# Patient Record
Sex: MALE | Race: WHITE | NOT HISPANIC OR LATINO | URBAN - METROPOLITAN AREA
[De-identification: names, ages, dates, MRNs, and addresses within clinical notes are randomized per-mention and may not be internally consistent; named-entity substitution may affect disease eponyms.]

---

## 2019-10-07 ENCOUNTER — INPATIENT (INPATIENT)
Facility: HOSPITAL | Age: 79
LOS: 9 days | Discharge: HOME HEALTH | DRG: 233 | End: 2019-10-17
Attending: THORACIC SURGERY (CARDIOTHORACIC VASCULAR SURGERY) | Admitting: THORACIC SURGERY (CARDIOTHORACIC VASCULAR SURGERY)
Payer: MEDICARE

## 2019-10-07 VITALS
HEIGHT: 67 IN | OXYGEN SATURATION: 95 % | HEART RATE: 77 BPM | RESPIRATION RATE: 16 BRPM | WEIGHT: 179.9 LBS | SYSTOLIC BLOOD PRESSURE: 144 MMHG | TEMPERATURE: 98 F | DIASTOLIC BLOOD PRESSURE: 82 MMHG

## 2019-10-07 DIAGNOSIS — E78.5 HYPERLIPIDEMIA, UNSPECIFIED: ICD-10-CM

## 2019-10-07 DIAGNOSIS — D62 ACUTE POSTHEMORRHAGIC ANEMIA: ICD-10-CM

## 2019-10-07 DIAGNOSIS — D69.1 QUALITATIVE PLATELET DEFECTS: ICD-10-CM

## 2019-10-07 DIAGNOSIS — N40.1 BENIGN PROSTATIC HYPERPLASIA WITH LOWER URINARY TRACT SYMPTOMS: ICD-10-CM

## 2019-10-07 DIAGNOSIS — T81.19XA OTHER POSTPROCEDURAL SHOCK, INITIAL ENCOUNTER: ICD-10-CM

## 2019-10-07 DIAGNOSIS — I10 ESSENTIAL (PRIMARY) HYPERTENSION: ICD-10-CM

## 2019-10-07 DIAGNOSIS — Z88.1 ALLERGY STATUS TO OTHER ANTIBIOTIC AGENTS STATUS: ICD-10-CM

## 2019-10-07 DIAGNOSIS — R09.02 HYPOXEMIA: ICD-10-CM

## 2019-10-07 DIAGNOSIS — I25.10 ATHEROSCLEROTIC HEART DISEASE OF NATIVE CORONARY ARTERY WITHOUT ANGINA PECTORIS: ICD-10-CM

## 2019-10-07 DIAGNOSIS — I70.1 ATHEROSCLEROSIS OF RENAL ARTERY: ICD-10-CM

## 2019-10-07 DIAGNOSIS — T81.82XA EMPHYSEMA (SUBCUTANEOUS) RESULTING FROM A PROCEDURE, INITIAL ENCOUNTER: ICD-10-CM

## 2019-10-07 DIAGNOSIS — M10.9 GOUT, UNSPECIFIED: ICD-10-CM

## 2019-10-07 DIAGNOSIS — Z87.891 PERSONAL HISTORY OF NICOTINE DEPENDENCE: ICD-10-CM

## 2019-10-07 DIAGNOSIS — I25.110 ATHEROSCLEROTIC HEART DISEASE OF NATIVE CORONARY ARTERY WITH UNSTABLE ANGINA PECTORIS: ICD-10-CM

## 2019-10-07 DIAGNOSIS — I50.42 CHRONIC COMBINED SYSTOLIC (CONGESTIVE) AND DIASTOLIC (CONGESTIVE) HEART FAILURE: ICD-10-CM

## 2019-10-07 DIAGNOSIS — Z86.79 PERSONAL HISTORY OF OTHER DISEASES OF THE CIRCULATORY SYSTEM: Chronic | ICD-10-CM

## 2019-10-07 DIAGNOSIS — J95.811 POSTPROCEDURAL PNEUMOTHORAX: ICD-10-CM

## 2019-10-07 DIAGNOSIS — K21.9 GASTRO-ESOPHAGEAL REFLUX DISEASE WITHOUT ESOPHAGITIS: ICD-10-CM

## 2019-10-07 DIAGNOSIS — I11.0 HYPERTENSIVE HEART DISEASE WITH HEART FAILURE: ICD-10-CM

## 2019-10-07 DIAGNOSIS — I31.4 CARDIAC TAMPONADE: ICD-10-CM
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
## 2019-10-07 DIAGNOSIS — T45.525A ADVERSE EFFECT OF ANTITHROMBOTIC DRUGS, INITIAL ENCOUNTER: ICD-10-CM

## 2019-10-07 DIAGNOSIS — I97.611 POSTPROCEDURAL HEMORRHAGE OF A CIRCULATORY SYSTEM ORGAN OR STRUCTURE FOLLOWING CARDIAC BYPASS: ICD-10-CM

## 2019-10-07 DIAGNOSIS — Z95.5 PRESENCE OF CORONARY ANGIOPLASTY IMPLANT AND GRAFT: ICD-10-CM

## 2019-10-07 DIAGNOSIS — I25.2 OLD MYOCARDIAL INFARCTION: ICD-10-CM

## 2019-10-07 DIAGNOSIS — Z95.5 PRESENCE OF CORONARY ANGIOPLASTY IMPLANT AND GRAFT: Chronic | ICD-10-CM

## 2019-10-07 DIAGNOSIS — R33.8 OTHER RETENTION OF URINE: ICD-10-CM
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
## 2019-10-07 DIAGNOSIS — N17.9 ACUTE KIDNEY FAILURE, UNSPECIFIED: ICD-10-CM

## 2019-10-07 DIAGNOSIS — T39.015A ADVERSE EFFECT OF ASPIRIN, INITIAL ENCOUNTER: ICD-10-CM

## 2019-10-07 DIAGNOSIS — J95.821 ACUTE POSTPROCEDURAL RESPIRATORY FAILURE: ICD-10-CM

## 2019-10-07 LAB
ALBUMIN SERPL ELPH-MCNC: 4.1 G/DL — SIGNIFICANT CHANGE UP (ref 3.3–5)
ALP SERPL-CCNC: 72 U/L — SIGNIFICANT CHANGE UP (ref 40–120)
ALT FLD-CCNC: 18 U/L — SIGNIFICANT CHANGE UP (ref 10–45)
ANION GAP SERPL CALC-SCNC: 11 MMOL/L — SIGNIFICANT CHANGE UP (ref 5–17)
APTT BLD: 34.4 SEC — SIGNIFICANT CHANGE UP (ref 27.5–36.3)
AST SERPL-CCNC: 23 U/L — SIGNIFICANT CHANGE UP (ref 10–40)
BILIRUB SERPL-MCNC: 1 MG/DL — SIGNIFICANT CHANGE UP (ref 0.2–1.2)
BLD GP AB SCN SERPL QL: NEGATIVE — SIGNIFICANT CHANGE UP
BUN SERPL-MCNC: 16 MG/DL — SIGNIFICANT CHANGE UP (ref 7–23)
CALCIUM SERPL-MCNC: 9.2 MG/DL — SIGNIFICANT CHANGE UP (ref 8.4–10.5)
CHLORIDE SERPL-SCNC: 101 MMOL/L — SIGNIFICANT CHANGE UP (ref 96–108)
CHOLEST SERPL-MCNC: 135 MG/DL — SIGNIFICANT CHANGE UP (ref 10–199)
CO2 SERPL-SCNC: 24 MMOL/L — SIGNIFICANT CHANGE UP (ref 22–31)
CREAT SERPL-MCNC: 0.88 MG/DL — SIGNIFICANT CHANGE UP (ref 0.5–1.3)
GLUCOSE SERPL-MCNC: 90 MG/DL — SIGNIFICANT CHANGE UP (ref 70–99)
HBA1C BLD-MCNC: 4.9 % — SIGNIFICANT CHANGE UP (ref 4–5.6)
HCT VFR BLD CALC: 40 % — SIGNIFICANT CHANGE UP (ref 39–50)
HDLC SERPL-MCNC: 71 MG/DL — SIGNIFICANT CHANGE UP
HGB BLD-MCNC: 14.3 G/DL — SIGNIFICANT CHANGE UP (ref 13–17)
INR BLD: 1.2 — HIGH (ref 0.88–1.16)
LIPID PNL WITH DIRECT LDL SERPL: 44 MG/DL — SIGNIFICANT CHANGE UP
MCHC RBC-ENTMCNC: 32.9 PG — SIGNIFICANT CHANGE UP (ref 27–34)
MCHC RBC-ENTMCNC: 35.8 GM/DL — SIGNIFICANT CHANGE UP (ref 32–36)
MCV RBC AUTO: 92 FL — SIGNIFICANT CHANGE UP (ref 80–100)
NRBC # BLD: 0 /100 WBCS — SIGNIFICANT CHANGE UP (ref 0–0)
NT-PROBNP SERPL-SCNC: 1875 PG/ML — HIGH (ref 0–300)
PLATELET # BLD AUTO: 157 K/UL — SIGNIFICANT CHANGE UP (ref 150–400)
POTASSIUM SERPL-MCNC: 4.1 MMOL/L — SIGNIFICANT CHANGE UP (ref 3.5–5.3)
POTASSIUM SERPL-SCNC: 4.1 MMOL/L — SIGNIFICANT CHANGE UP (ref 3.5–5.3)
PROT SERPL-MCNC: 7.2 G/DL — SIGNIFICANT CHANGE UP (ref 6–8.3)
PROTHROM AB SERPL-ACNC: 13.6 SEC — HIGH (ref 10–12.9)
RBC # BLD: 4.35 M/UL — SIGNIFICANT CHANGE UP (ref 4.2–5.8)
RBC # FLD: 15 % — HIGH (ref 10.3–14.5)
RH IG SCN BLD-IMP: POSITIVE — SIGNIFICANT CHANGE UP
SODIUM SERPL-SCNC: 136 MMOL/L — SIGNIFICANT CHANGE UP (ref 135–145)
TOTAL CHOLESTEROL/HDL RATIO MEASUREMENT: 1.9 RATIO — LOW (ref 3.4–9.6)
TRIGL SERPL-MCNC: 101 MG/DL — SIGNIFICANT CHANGE UP (ref 10–149)
TSH SERPL-MCNC: 2.24 UIU/ML — SIGNIFICANT CHANGE UP (ref 0.35–4.94)
WBC # BLD: 12.08 K/UL — HIGH (ref 3.8–10.5)
WBC # FLD AUTO: 12.08 K/UL — HIGH (ref 3.8–10.5)

## 2019-10-07 PROCEDURE — 99223 1ST HOSP IP/OBS HIGH 75: CPT

## 2019-10-07 PROCEDURE — 93454 CORONARY ARTERY ANGIO S&I: CPT | Mod: 26

## 2019-10-07 PROCEDURE — 71046 X-RAY EXAM CHEST 2 VIEWS: CPT | Mod: 26

## 2019-10-07 PROCEDURE — 71045 X-RAY EXAM CHEST 1 VIEW: CPT | Mod: 26

## 2019-10-07 PROCEDURE — 93010 ELECTROCARDIOGRAM REPORT: CPT

## 2019-10-07 PROCEDURE — 33967 INSERT I-AORT PERCUT DEVICE: CPT

## 2019-10-07 PROCEDURE — 99285 EMERGENCY DEPT VISIT HI MDM: CPT | Mod: 25

## 2019-10-07 RX ORDER — ROSUVASTATIN CALCIUM 5 MG/1
0 TABLET ORAL
Qty: 0 | Refills: 0 | DISCHARGE

## 2019-10-07 RX ORDER — ASPIRIN/CALCIUM CARB/MAGNESIUM 324 MG
81 TABLET ORAL EVERY 24 HOURS
Refills: 0 | Status: DISCONTINUED | OUTPATIENT
Start: 2019-10-08 | End: 2019-10-08

## 2019-10-07 RX ORDER — ASPIRIN/CALCIUM CARB/MAGNESIUM 324 MG
0 TABLET ORAL
Qty: 0 | Refills: 0 | DISCHARGE

## 2019-10-07 RX ORDER — ENOXAPARIN SODIUM 100 MG/ML
40 INJECTION SUBCUTANEOUS EVERY 24 HOURS
Refills: 0 | Status: DISCONTINUED | OUTPATIENT
Start: 2019-10-08 | End: 2019-10-08

## 2019-10-07 RX ORDER — CARVEDILOL PHOSPHATE 80 MG/1
1 CAPSULE, EXTENDED RELEASE ORAL
Qty: 0 | Refills: 0 | DISCHARGE

## 2019-10-07 RX ORDER — NITROGLYCERIN 6.5 MG
0.4 CAPSULE, EXTENDED RELEASE ORAL
Refills: 0 | Status: DISCONTINUED | OUTPATIENT
Start: 2019-10-07 | End: 2019-10-07

## 2019-10-07 RX ORDER — SODIUM CHLORIDE 9 MG/ML
500 INJECTION INTRAMUSCULAR; INTRAVENOUS; SUBCUTANEOUS
Refills: 0 | Status: DISCONTINUED | OUTPATIENT
Start: 2019-10-07 | End: 2019-10-08

## 2019-10-07 RX ORDER — CARVEDILOL PHOSPHATE 80 MG/1
25 CAPSULE, EXTENDED RELEASE ORAL ONCE
Refills: 0 | Status: COMPLETED | OUTPATIENT
Start: 2019-10-07 | End: 2019-10-07

## 2019-10-07 RX ORDER — CLOPIDOGREL BISULFATE 75 MG/1
600 TABLET, FILM COATED ORAL ONCE
Refills: 0 | Status: COMPLETED | OUTPATIENT
Start: 2019-10-07 | End: 2019-10-07

## 2019-10-07 RX ORDER — INFLUENZA VIRUS VACCINE 15; 15; 15; 15 UG/.5ML; UG/.5ML; UG/.5ML; UG/.5ML
0.5 SUSPENSION INTRAMUSCULAR ONCE
Refills: 0 | Status: DISCONTINUED | OUTPATIENT
Start: 2019-10-07 | End: 2019-10-08

## 2019-10-07 RX ORDER — ASPIRIN/CALCIUM CARB/MAGNESIUM 324 MG
325 TABLET ORAL ONCE
Refills: 0 | Status: COMPLETED | OUTPATIENT
Start: 2019-10-07 | End: 2019-10-07

## 2019-10-07 RX ORDER — AMLODIPINE BESYLATE 2.5 MG/1
10 TABLET ORAL ONCE
Refills: 0 | Status: COMPLETED | OUTPATIENT
Start: 2019-10-07 | End: 2019-10-07

## 2019-10-07 RX ORDER — MIDODRINE HYDROCHLORIDE 2.5 MG/1
5 TABLET ORAL THREE TIMES A DAY
Refills: 0 | Status: DISCONTINUED | OUTPATIENT
Start: 2019-10-07 | End: 2019-10-07

## 2019-10-07 RX ORDER — ATORVASTATIN CALCIUM 80 MG/1
80 TABLET, FILM COATED ORAL AT BEDTIME
Refills: 0 | Status: DISCONTINUED | OUTPATIENT
Start: 2019-10-07 | End: 2019-10-08

## 2019-10-07 RX ORDER — ALLOPURINOL 300 MG
0 TABLET ORAL
Qty: 0 | Refills: 0 | DISCHARGE

## 2019-10-07 RX ADMIN — CARVEDILOL PHOSPHATE 25 MILLIGRAM(S): 80 CAPSULE, EXTENDED RELEASE ORAL at 07:44

## 2019-10-07 RX ADMIN — ATORVASTATIN CALCIUM 80 MILLIGRAM(S): 80 TABLET, FILM COATED ORAL at 23:06

## 2019-10-07 RX ADMIN — AMLODIPINE BESYLATE 10 MILLIGRAM(S): 2.5 TABLET ORAL at 07:45

## 2019-10-07 RX ADMIN — Medication 0.4 MILLIGRAM(S): at 07:38

## 2019-10-07 RX ADMIN — Medication 0.4 MILLIGRAM(S): at 08:17

## 2019-10-07 RX ADMIN — SODIUM CHLORIDE 50 MILLILITER(S): 9 INJECTION INTRAMUSCULAR; INTRAVENOUS; SUBCUTANEOUS at 12:01

## 2019-10-07 RX ADMIN — CLOPIDOGREL BISULFATE 600 MILLIGRAM(S): 75 TABLET, FILM COATED ORAL at 11:47

## 2019-10-07 RX ADMIN — Medication 325 MILLIGRAM(S): at 07:36

## 2019-10-07 NOTE — H&P ADULT - RS GEN PE MLT RESP DETAILS PC
good air movement/respirations non-labored/breath sounds equal/clear to auscultation bilaterally/normal/airway patent

## 2019-10-07 NOTE — ED ADULT NURSE NOTE - OBJECTIVE STATEMENT
78 y/o male c/o cp. Pt denies sob. Pt reports mid sternal CP started approximately 1.5 hours ago. Pt report cardiac stent history. Pt denies dizzines, N/V or cough. Pt speaks clear, MAEx4, ambulates steady, unlabored breathing. abd soft nt nd. Skin dry warm

## 2019-10-07 NOTE — ED PROVIDER NOTE - PROGRESS NOTE DETAILS
DDimer, trop neg.  Pt feeling better after meds.  Pt discussed w Dr Patiño - no preference for cardiologist.  He states pt w recent abnl echo and ekg in his office which is why he'd arranged outpt fu w cardiologist (cardiologist is out of [facility]).  Pt discussed w hospitalist, Dr Milton - pt tba to him for r/o and eval.

## 2019-10-07 NOTE — ED PROVIDER NOTE - DIAGNOSTIC INTERPRETATION
ER Physician:  Isabel Director  CHEST XRAY INTERPRETATION: lungs clear, tortuous aorta, heart shadow normal, bony structures intact

## 2019-10-07 NOTE — ED PROVIDER NOTE - OBJECTIVE STATEMENT
78 yo male h/o cad s/p stent x 2 (2010), renal artery stenosis s/p stent, htn, hld, gout, former tob c/o sscp since 4am, now 3/10 but more intense at time of onset.  No h/o recent exertional cp/sob/palpitations.  No associated n, dizziness, diaphoresis, sob, palpitations, abd pain.  Pt feels pain in his upper back but does not feel it's radiating from his chest, no radiation to arm/neck/jaw.  Pt reports h/o similar back pain in the past.  No fever, uri sx, cough.  No le pain/swelling.  Pt recently saw his pmd and has upcoming appt w cardiology bc of not having had recent fu but does not recall the name of his new cardiologist.  No meds pta for sx.  Pt did not take his am meds yet.  No recent travel, le pain/swelling.

## 2019-10-07 NOTE — H&P ADULT - PROBLEM SELECTOR PLAN 1
-1st Troponin negative. 2/10 Chest pain.  -Cardiac Catheterization today with Dr. Langston. Consent obtained, in [place] office.  -s/p ASA/Plavix load.   -Echo ordered  -Will continue home BP/statin regimen for now

## 2019-10-07 NOTE — ED PROVIDER NOTE - PMH
CAD (coronary artery disease)    Gout    HLD (hyperlipidemia)    HTN (hypertension)    Renal artery stenosis

## 2019-10-07 NOTE — H&P ADULT - NSHPSOCIALHISTORY_GEN_ALL_CORE
Former smoker: quit 49 years ago  ETOH: one glass of wine with dinner a day  Denies drug use history

## 2019-10-07 NOTE — ED PROVIDER NOTE - MUSCULOSKELETAL, MLM
Spine appears normal, trace edema bilat le, no calf ttp, range of motion is not limited, no muscle or joint tenderness

## 2019-10-07 NOTE — H&P ADULT - ASSESSMENT
80 yo male, former smoker with a PMhx of HTN, hyperlipidemia, Renal artery stenosis s/p Left renal stent placement (5/2011), known CAD s/p MI treated with thrombectomy/ROBERTA pLAD and PTCA/ROBERTA mLAD ([facility] 6/15/2010) followed by ROBERTA to prox/mid/distal LCx with tandemheart LV assist device ([facility]; 6/21/2010) presented to [facility] ED (10/7/2019) with unstable angina with plan for cardiac catheterization with possible intervention if clinically indicated.     Risks & benefits of procedure and alternative therapy have been explained to the patient including but not limited to: allergic reaction, bleeding w/possible need for blood transfusion, infection, renal and vascular compromise, limb damage, arrhythmia, stroke, vessel dissection/perforation, Myocardial infarction, emergent CABG. Informed consent obtained and in chart.

## 2019-10-07 NOTE — PROGRESS NOTE ADULT - SUBJECTIVE AND OBJECTIVE BOX
SUBJECTIVE/INTERVAL HPI:  Saw and examined patient at bedside upon arrival on 5EA.    VITAL SIGNS:  Vital Signs Last 24 Hrs  T(C): 37 (07 Oct 2019 20:50), Max: 37 (07 Oct 2019 20:50)  T(F): 98.6 (07 Oct 2019 20:50), Max: 98.6 (07 Oct 2019 20:50)  HR: 62 (07 Oct 2019 20:00) (62 - 77)  BP: 104/72 (07 Oct 2019 20:00) (104/72 - 148/74)  BP(mean): 90 (07 Oct 2019 20:00) (61 - 102)  RR: 14 (07 Oct 2019 20:00) (14 - 21)  SpO2: 93% (07 Oct 2019 20:00) (93% - 100%)    PHYSICAL EXAM:        MEDICATIONS:  MEDICATIONS  (STANDING):  influenza   Vaccine 0.5 milliLiter(s) IntraMuscular once  sodium chloride 0.9%. 500 milliLiter(s) (50 mL/Hr) IV Continuous <Continuous>    MEDICATIONS  (PRN):      ALLERGIES:  Allergies    penicillin (Unknown)    Intolerances        LABS:                        14.4   8.96  )-----------( 160      ( 07 Oct 2019 06:50 )             41.0     10-07    136  |  100  |  21  ----------------------------<  106<H>  4.3   |  22  |  1.07    Ca    9.1      07 Oct 2019 06:50  Mg     1.7     10-07    TPro  7.1  /  Alb  4.2  /  TBili  0.4  /  DBili  x   /  AST  20  /  ALT  20  /  AlkPhos  67  10-07    PT/INR - ( 07 Oct 2019 06:50 )   PT: 12.6 sec;   INR: 1.11          PTT - ( 07 Oct 2019 06:50 )  PTT:34.3 sec    CAPILLARY BLOOD GLUCOSE          RADIOLOGY & ADDITIONAL TESTS: Reviewed. SUBJECTIVE/INTERVAL HPI:  Saw and examined patient at bedside upon arrival on 5EA. Still reports L-sided chest pain, however improved from earlier, now rated as 2/10.    VITAL SIGNS:  Vital Signs Last 24 Hrs  T(C): 37 (07 Oct 2019 20:50), Max: 37 (07 Oct 2019 20:50)  T(F): 98.6 (07 Oct 2019 20:50), Max: 98.6 (07 Oct 2019 20:50)  HR: 62 (07 Oct 2019 20:00) (62 - 77)  BP: 104/72 (07 Oct 2019 20:00) (104/72 - 148/74)  BP(mean): 90 (07 Oct 2019 20:00) (61 - 102)  RR: 14 (07 Oct 2019 20:00) (14 - 21)  SpO2: 93% (07 Oct 2019 20:00) (93% - 100%)    PHYSICAL EXAM:  GENERAL: In NAD. Lying in bed comfortably.  HEENT: NC/AT. PERRL. EOMI. Neck supple. No JVD. No lymphadenopathy. No thyromegaly.  CV: RRR. +S1/S2. No murmurs, rubs or gallops  LUNGS: Clear to auscultation b/l. No wheezing, rales or rhonchi.  ABDOMEN: Soft, nontender, nondistended. +BSx4. No guarding or rebound tenderness.  EXT: WWP. No edema in b/l extremities  VASCULAR: 2+ radial, DP/PT bilaterally       MEDICATIONS:  MEDICATIONS  (STANDING):  influenza   Vaccine 0.5 milliLiter(s) IntraMuscular once  sodium chloride 0.9%. 500 milliLiter(s) (50 mL/Hr) IV Continuous <Continuous>    MEDICATIONS  (PRN):      ALLERGIES:  Allergies    penicillin (Unknown)    Intolerances        LABS:                        14.4   8.96  )-----------( 160      ( 07 Oct 2019 06:50 )             41.0     10-07    136  |  100  |  21  ----------------------------<  106<H>  4.3   |  22  |  1.07    Ca    9.1      07 Oct 2019 06:50  Mg     1.7     10-07    TPro  7.1  /  Alb  4.2  /  TBili  0.4  /  DBili  x   /  AST  20  /  ALT  20  /  AlkPhos  67  10-07    PT/INR - ( 07 Oct 2019 06:50 )   PT: 12.6 sec;   INR: 1.11          PTT - ( 07 Oct 2019 06:50 )  PTT:34.3 sec    CAPILLARY BLOOD GLUCOSE          RADIOLOGY & ADDITIONAL TESTS: Reviewed. 5EA TRANSFER ACCEPTANCE NOTE    HOSPITAL COURSE:  79 year old male, former smoker, with history of HTN, HLD, renal artery stenosis s/p left renal stent in 2011, CAD s/p MI with thrombectomy and PES (ROBERTA to pLAD and PTCA/ROBERTA to mLAD in 6/2010 with ROBERTA to LCx with tandem heart/LVAD a week later on 6/21/2010 who presented to [facility] ED on 10/7/19 with substernal chest pain/pressure, 5/10, non-radiating that began that morning at approximately 4am without relief.  He reports that over the last month he has had intermittent "indigestion" with spontaneous onset as well as claudication with diminished exercise tolerance for which he was recently evaluated by his PMD, Dr. Patiño, where he had an "abnormal ECHO" and was encouraged to follow-up with cardiology.  In ED, VS stable, EKG showed TWI in II, III  with ST depressions in V4-V6 with negative troponin.  Pain was not relieved with medical therapies and underwent cardiac cath showing LM normal, 99% mLAD, pLCx 100% with L-L collaterals, mRCA 100% with R-R collaterals.  IABP was placed and he was admitted to CCU for further evaluation and management.  Dr. Small, CT surgery, consulted for surgical evaluation.    SUBJECTIVE/INTERVAL HPI:  Saw and examined patient at bedside upon arrival on 5EA. Still reports L-sided chest pain, however improved from earlier, now rated as 2/10. Denies any SOB, nausea, vomiting.    VITAL SIGNS:  Vital Signs Last 24 Hrs  T(C): 37 (07 Oct 2019 20:50), Max: 37 (07 Oct 2019 20:50)  T(F): 98.6 (07 Oct 2019 20:50), Max: 98.6 (07 Oct 2019 20:50)  HR: 62 (07 Oct 2019 20:00) (62 - 77)  BP: 104/72 (07 Oct 2019 20:00) (104/72 - 148/74)  BP(mean): 90 (07 Oct 2019 20:00) (61 - 102)  RR: 14 (07 Oct 2019 20:00) (14 - 21)  SpO2: 93% (07 Oct 2019 20:00) (93% - 100%)    PHYSICAL EXAM:  GENERAL: In NAD. Lying in bed comfortably.  HEENT: NC/AT. PERRL. EOMI. Neck supple. No JVD. No lymphadenopathy. No thyromegaly.  CV: RRR. +S1/S2. No murmurs, rubs or gallops  LUNGS: Clear to auscultation b/l. No wheezing, rales or rhonchi.  ABDOMEN: Soft, nontender, nondistended. +BSx4. No guarding or rebound tenderness.  EXT: WWP. No edema in b/l extremities  VASCULAR: 2+ radial, DP/PT bilaterally       MEDICATIONS:  MEDICATIONS  (STANDING):  influenza   Vaccine 0.5 milliLiter(s) IntraMuscular once  sodium chloride 0.9%. 500 milliLiter(s) (50 mL/Hr) IV Continuous <Continuous>    MEDICATIONS  (PRN):      ALLERGIES:  Allergies    penicillin (Unknown)    Intolerances        LABS:                        14.4   8.96  )-----------( 160      ( 07 Oct 2019 06:50 )             41.0     10-07    136  |  100  |  21  ----------------------------<  106<H>  4.3   |  22  |  1.07    Ca    9.1      07 Oct 2019 06:50  Mg     1.7     10-07    TPro  7.1  /  Alb  4.2  /  TBili  0.4  /  DBili  x   /  AST  20  /  ALT  20  /  AlkPhos  67  10-07    PT/INR - ( 07 Oct 2019 06:50 )   PT: 12.6 sec;   INR: 1.11          PTT - ( 07 Oct 2019 06:50 )  PTT:34.3 sec    CAPILLARY BLOOD GLUCOSE          RADIOLOGY & ADDITIONAL TESTS: Reviewed. 5EA TRANSFER ACCEPTANCE NOTE    HOSPITAL COURSE:  79 year old male, former smoker, with history of HTN, HLD, renal artery stenosis s/p left renal stent in 2011, CAD s/p MI with thrombectomy and PES (ROBERTA to pLAD and PTCA/ROBERTA to mLAD in 6/2010 with ROBERTA to LCx with tandem heart/LVAD a week later on 6/21/2010 who presented to [facility] ED on 10/7/19 with substernal chest pain/pressure, 5/10, non-radiating that began that morning at approximately 4am without relief. In ED, VS stable, EKG showed TWI in II, III  with ST depressions in V4-V6 with negative troponin.  Pain was not relieved with medical therapies and underwent cardiac cath showing LM normal, 99% mLAD, pLCx 100% with L-L collaterals, mRCA 100% with R-R collaterals.  IABP was placed 10/7 and he was admitted to CCU for further evaluation and management.  Dr. Small of CT surgery was consulted for surgical evaluation.    SUBJECTIVE/INTERVAL HPI:  Saw and examined patient at bedside upon arrival on 5EA. Still reports L-sided chest pain, however improved from earlier, now rated as 2/10. Denies any SOB, nausea, vomiting.    VITAL SIGNS:  Vital Signs Last 24 Hrs  T(C): 37 (07 Oct 2019 20:50), Max: 37 (07 Oct 2019 20:50)  T(F): 98.6 (07 Oct 2019 20:50), Max: 98.6 (07 Oct 2019 20:50)  HR: 62 (07 Oct 2019 20:00) (62 - 77)  BP: 104/72 (07 Oct 2019 20:00) (104/72 - 148/74)  BP(mean): 90 (07 Oct 2019 20:00) (61 - 102)  RR: 14 (07 Oct 2019 20:00) (14 - 21)  SpO2: 93% (07 Oct 2019 20:00) (93% - 100%)    PHYSICAL EXAM:  GENERAL: In NAD. Lying in bed comfortably.  HEENT: NC/AT. PERRL. EOMI. Neck supple.   CV: RRR. +S1/S2. No murmurs, rubs or gallops  LUNGS: Clear to auscultation b/l. No wheezing  ABDOMEN: Soft, nontender, nondistended. +BSx4.  : IABP in L groin, no hematoma  EXT: WWP. No edema in b/l extremities  VASCULAR: 2+ radial, DP/ bilaterally       MEDICATIONS:  MEDICATIONS  (STANDING):  influenza   Vaccine 0.5 milliLiter(s) IntraMuscular once  sodium chloride 0.9%. 500 milliLiter(s) (50 mL/Hr) IV Continuous <Continuous>    MEDICATIONS  (PRN):      ALLERGIES:  Allergies    penicillin (Unknown)    Intolerances        LABS:                        14.4   8.96  )-----------( 160      ( 07 Oct 2019 06:50 )             41.0     10-07    136  |  100  |  21  ----------------------------<  106<H>  4.3   |  22  |  1.07    Ca    9.1      07 Oct 2019 06:50  Mg     1.7     10-07    TPro  7.1  /  Alb  4.2  /  TBili  0.4  /  DBili  x   /  AST  20  /  ALT  20  /  AlkPhos  67  10-07    PT/INR - ( 07 Oct 2019 06:50 )   PT: 12.6 sec;   INR: 1.11          PTT - ( 07 Oct 2019 06:50 )  PTT:34.3 sec    CAPILLARY BLOOD GLUCOSE          RADIOLOGY & ADDITIONAL TESTS: Reviewed. 5EA TRANSFER ACCEPTANCE NOTE    HOSPITAL COURSE:  79 year old male, former smoker, with history of HTN, HLD, renal artery stenosis s/p left renal stent in 2011, CAD s/p MI with thrombectomy and PES (ROBERTA to pLAD and PTCA/ROBERTA to mLAD in 6/2010 with ROBERTA to LCx with tandem heart/LVAD a week later on 6/21/2010 who presented to [facility] ED on 10/7/19 with substernal chest pain/pressure, 5/10, non-radiating that began that morning at approximately 4am without relief. In ED, VS stable, EKG showed TWI in II, III  with ST depressions in V4-V6 with negative troponin.  Pain was not relieved with medical therapies and underwent cardiac cath showing LM normal, 99% mLAD, pLCx 100% with L-L collaterals, mRCA 100% with R-R collaterals.  IABP was placed 10/7 and he was admitted to CCU for further evaluation and management.  Dr. Small of CT surgery was consulted for CABG evaluation.    SUBJECTIVE/INTERVAL HPI:  Saw and examined patient at bedside upon arrival on 5EA. Still reports L-sided chest pain, however improved from earlier, now rated as 2/10. Denies any SOB, nausea, vomiting.    VITAL SIGNS:  Vital Signs Last 24 Hrs  T(C): 37 (07 Oct 2019 20:50), Max: 37 (07 Oct 2019 20:50)  T(F): 98.6 (07 Oct 2019 20:50), Max: 98.6 (07 Oct 2019 20:50)  HR: 62 (07 Oct 2019 20:00) (62 - 77)  BP: 104/72 (07 Oct 2019 20:00) (104/72 - 148/74)  BP(mean): 90 (07 Oct 2019 20:00) (61 - 102)  RR: 14 (07 Oct 2019 20:00) (14 - 21)  SpO2: 93% (07 Oct 2019 20:00) (93% - 100%)    PHYSICAL EXAM:  GENERAL: In NAD. Lying in bed comfortably.  HEENT: NC/AT. PERRL. EOMI. Neck supple.   CV: RRR. +S1/S2. No murmurs, rubs or gallops  LUNGS: Clear to auscultation b/l. No wheezing  ABDOMEN: Soft, nontender, nondistended. +BSx4.  : IABP in L groin, no hematoma  EXT: WWP. No edema in b/l extremities  VASCULAR: 2+ radial, DP/ bilaterally       MEDICATIONS:  MEDICATIONS  (STANDING):  influenza   Vaccine 0.5 milliLiter(s) IntraMuscular once  sodium chloride 0.9%. 500 milliLiter(s) (50 mL/Hr) IV Continuous <Continuous>    MEDICATIONS  (PRN):      ALLERGIES:  Allergies    penicillin (Unknown)    Intolerances        LABS:                        14.4   8.96  )-----------( 160      ( 07 Oct 2019 06:50 )             41.0     10-07    136  |  100  |  21  ----------------------------<  106<H>  4.3   |  22  |  1.07    Ca    9.1      07 Oct 2019 06:50  Mg     1.7     10-07    TPro  7.1  /  Alb  4.2  /  TBili  0.4  /  DBili  x   /  AST  20  /  ALT  20  /  AlkPhos  67  10-07    PT/INR - ( 07 Oct 2019 06:50 )   PT: 12.6 sec;   INR: 1.11          PTT - ( 07 Oct 2019 06:50 )  PTT:34.3 sec    CAPILLARY BLOOD GLUCOSE          RADIOLOGY & ADDITIONAL TESTS: Reviewed.

## 2019-10-07 NOTE — PROGRESS NOTE ADULT - ATTENDING COMMENTS
Pt is a 80 y/o man c SHANNAN s/p MAXIMILIANO (5'11), CAD s/p MI s/p ROBERTA to pLAD, mLAD, LCX who presents with chest pain for eval.    ECG: nsr @ 75 c downsloping ST dep 1mm II, V4-V6    Cath: 99% mLAD  100% pLCX, mLCX  100% mRCA  IABP placed for severe ischemia    ECG: depressions resolved    afeb, HR 62, MAP  (Ass), Aug press 150, 95%  I/O -1.5L    Hgb 14.1  Plt 160  Cr 0.82  BNP 1875  Yajaira 0.05    CXR: focal rt sided atelectasis    - pt with nstemi and severe 3v dx s/p IABP placement  - pt still with some mild chest pain but fleeting  - cont asa, hold plavix for CABG  - cont lipitor, start lopressor for acs  - post op, pt can be started on ACE  - cont hep gtt  - cont IABP and remove post op  - f/u TTE  - perhaps to OR today

## 2019-10-07 NOTE — PATIENT PROFILE ADULT - VISION (WITH CORRECTIVE LENSES IF THE PATIENT USUALLY WEARS THEM):
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

## 2019-10-07 NOTE — H&P ADULT - NSICDXPASTMEDICALHX_GEN_ALL_CORE_FT
PAST MEDICAL HISTORY:  CAD (coronary artery disease)     Gout     HLD (hyperlipidemia)     HTN (hypertension)     Renal artery stenosis

## 2019-10-07 NOTE — PROGRESS NOTE ADULT - ASSESSMENT
79 year old male, former smoker, with history of HTN, HLD, renal artery stenosis s/p left renal stent in 2011, CAD s/p MI with thrombectomy and PES (ROBERTA to pLAD and PTCA/ROBERTA to mLAD in 6/2010 with ROBERTA to LCx with tandemheart/LVAD a week later on 6/21/2010 who presented to [facility] ED on 10/7/19 with substernal chest pain/pressure, 5/10, non-radiating that began that morning at approximately 4am without relief. EKG in ED showed TWI in II, III  with ST depressions in V4-V6 with negative troponin. Cardiac cath 10/7 showing LM normal, 99% mLAD, pLCx 100% with L-L collaterals, mRCA 100% with R-R collaterals.  IABP was placed and he was admitted to CCU for further evaluation and management.  Dr. Small of CT surgery was consulted for surgical evaluation.    NEURO  Pt AAOx3. Pt with no neurological complaints or issues of concern at this time    CARDIOVASCULAR  #CAD (coronary artery disease)  -1st Troponin negative. 2/10 Chest pain; s/p ASA/Plavix load.   - Cardiac cath 10/7 showing LM normal, 99% mLAD, pLCx 100% with L-L collaterals, mRCA 100% with R-R collaterals.  -Echo ordered  -Will hold anti-hypertensive medications and ACEI/ARBs for now  - pt on crestor at home, c/w lipitor 80mg qd here    #HTN (hypertension)  -Will hold anti-hypertensive medications and ACEI/ARBs for now    #HLD  -F/U AM lipid panel.  - pt on crestor at home, c/w lipitor 80mg qd here    PULMONARY  Pt with no pulmonary complaints or issues of concern at this time    GASTROINTESTINAL  Pt with no GI complaints or issues of concern at this time    RENAL  #Renal artery stenosis  -s/p Left renal artery stent @ [facility] 5/2011  -Has not had follow up since placement  -BUN/Cr 21/1.07; repeat 16/0.88  -Would continue to monitor with daily BMPs    INFECTIOUS DISEASE  Pt afebrile, WBC elevated at 12.08. CXR without consolidation. U/A negative.    FLUIDS/ELECTROLYTES/NUTRITION  -IVF: none, s/p NS @50cc/hr for 4 hrs  -Monitor, Replete to K>4 and Mg>2  -Diet: DASH/TLC    PROPHYLAXIS  -DVT: lovenox  -GI: none  DISPO: CCU  CODE STATUS: FULL 79 year old male, former smoker, with history of HTN, HLD, renal artery stenosis s/p left renal stent in 2011, CAD s/p MI with thrombectomy and PES (ROBERTA to pLAD and PTCA/ROBERTA to mLAD in 6/2010 with ROBERTA to LCx with tandemheart/LVAD a week later on 6/21/2010 who presented to [facility] ED on 10/7/19 with substernal chest pain/pressure, 5/10, non-radiating that began that morning at approximately 4am without relief. EKG in ED showed TWI in II, III  with ST depressions in V4-V6 with negative troponin. Cardiac cath 10/7 showing LM normal, 99% mLAD, pLCx 100% with L-L collaterals, mRCA 100% with R-R collaterals.  IABP was placed and he was admitted to CCU for further evaluation and management.  Dr. Small of CT surgery was consulted for surgical evaluation.    NEURO  Pt AAOx3. Pt with no neurological complaints or issues of concern at this time    CARDIOVASCULAR  #CAD (coronary artery disease)  -1st Troponin negative. 2/10 Chest pain; s/p ASA/Plavix load.   - Cardiac cath 10/7 showing LM normal, 99% mLAD, pLCx 100% with L-L collaterals, mRCA 100% with R-R collaterals.  -Echo ordered  -Will hold anti-hypertensive medications and ACEI/ARBs for now  - pt on crestor at home, c/w lipitor 80mg qd here    #HTN (hypertension)  - sBP mostly ranging between 100s-120s  - Will hold anti-hypertensive medications and ACEI/ARBs for now    #HLD  - f/u AM lipid panel.  - pt on crestor at home, c/w lipitor 80mg qd here    PULMONARY  Pt with no pulmonary complaints or issues of concern at this time    GASTROINTESTINAL  Pt with no GI complaints or issues of concern at this time    RENAL  #Renal artery stenosis  -s/p Left renal artery stent @ [facility] 5/2011  -Has not had follow up since placement  -BUN/Cr 21/1.07; repeat 16/0.88  -Would continue to monitor with daily BMPs    INFECTIOUS DISEASE  Pt afebrile, WBC elevated at 12.08. CXR without consolidation. U/A negative.    FLUIDS/ELECTROLYTES/NUTRITION  -IVF: none, s/p NS @50cc/hr for 4 hrs  -Monitor, Replete to K>4 and Mg>2  -Diet: DASH/TLC    PROPHYLAXIS  -DVT: lovenox  -GI: none  DISPO: CCU  CODE STATUS: FULL 79 year old male, former smoker, with history of HTN, HLD, renal artery stenosis s/p left renal stent in 2011, CAD s/p MI with thrombectomy and PES (ROBERTA to pLAD and PTCA/ROBERTA to mLAD in 6/2010 with ROBERTA to LCx with tandemheart/LVAD a week later on 6/21/2010 who presented to [facility] ED on 10/7/19 with substernal chest pain/pressure, 5/10, non-radiating that began that morning at approximately 4am without relief. EKG in ED showed TWI in II, III  with ST depressions in V4-V6 with negative troponin. Cardiac cath 10/7 showing LM normal, 99% mLAD, pLCx 100% with L-L collaterals, mRCA 100% with R-R collaterals.  IABP was placed and he was admitted to CCU for further evaluation and management.  Dr. Small of CT surgery was consulted for surgical evaluation.    NEURO  Pt AAOx3. Pt with no neurological complaints or issues of concern at this time    CARDIOVASCULAR  #CAD (coronary artery disease)  -1st Troponin negative. 2/10 Chest pain; s/p ASA/Plavix load.   - Cardiac cath 10/7 showing LM normal, 99% mLAD, pLCx 100% with L-L collaterals, mRCA 100% with R-R collaterals.  - Echo ordered  - started lopressor 12.5mg BID  - Will hold other anti-hypertensive medications and ACEI/ARBs for now  - pt on crestor at home, c/w lipitor 80mg qd here    #HTN (hypertension)  - sBP mostly ranging between 100s-120s  - started lopressor 12.5mg BID  - Will hold anti-hypertensive medications and ACEI/ARBs for now    #HLD  - f/u AM lipid panel.  - pt on crestor at home, c/w lipitor 80mg qd here    PULMONARY  Pt with no pulmonary complaints or issues of concern at this time    GASTROINTESTINAL  Pt with no GI complaints or issues of concern at this time    RENAL  #Renal artery stenosis  -s/p Left renal artery stent @ [facility] 5/2011  -Has not had follow up since placement  -BUN/Cr 21/1.07; repeat 16/0.88  -Would continue to monitor with daily BMPs    RHEUMATOLOGIC   #Gout  - pt on allopurinol 300mg BID at home  - not in any acute flare here  - hold allopurinol for now    INFECTIOUS DISEASE  Pt afebrile, WBC elevated at 12.08. CXR without consolidation. U/A negative.    FLUIDS/ELECTROLYTES/NUTRITION  -IVF: none, s/p NS @50cc/hr for 4 hrs  -Monitor, Replete to K>4 and Mg>2  -Diet: DASH/TLC    PROPHYLAXIS  -DVT: lovenox  -GI: none  DISPO: CCU  CODE STATUS: FULL 79 year old male, former smoker, with history of HTN, HLD, renal artery stenosis s/p left renal stent in 2011, CAD s/p MI with thrombectomy and PES (ROBERTA to pLAD and PTCA/ROBERTA to mLAD in 6/2010 with ROBERTA to LCx with tandemheart/LVAD a week later on 6/21/2010 who presented to [facility] ED on 10/7/19 with substernal chest pain/pressure, 5/10, non-radiating that began that morning at approximately 4am without relief. EKG in ED showed TWI in II, III  with ST depressions in V4-V6 with negative troponin. Cardiac cath 10/7 showing LM normal, 99% mLAD, pLCx 100% with L-L collaterals, mRCA 100% with R-R collaterals.  IABP was placed and he was admitted to CCU for further evaluation and management.  Dr. Small of CT surgery was consulted for surgical evaluation.    NEURO  Pt with no neurological complaints or issues of concern at this time    CARDIOVASCULAR  #CAD (coronary artery disease)  -1st Troponin negative  - pt still with 2/10 Chest pain; s/p ASA/Plavix load.  - c/w asa, lipitor, beta-blocker (lopressor); holding plavix and coumadin   - Cardiac cath 10/7 showing LM normal, 99% mLAD, pLCx 100% with L-L collaterals, mRCA 100% with R-R collaterals.  - Echo ordered  - started lopressor 12.5mg BID  - Will hold other anti-hypertensive medications and ACEI/ARBs for now  - pt on crestor at home, c/w lipitor 80mg qd here  - CT surgery evaluated pt for possible CABG on Wed/Thurs  - f/u Lipid panel, TSH, HbA1c, ProBNP, T/S x2, room air ABG, carotid U/S, TTE, LE duplex    #HTN (hypertension)  - sBP mostly ranging between 100s-120s  - started lopressor 12.5mg BID  - Will hold anti-hypertensive medications and ACEI/ARBs for now    #HLD  - f/u AM lipid panel.  - pt on crestor at home, c/w lipitor 80mg qd here    PULMONARY  Pt with no pulmonary complaints or issues of concern at this time    GASTROINTESTINAL  Pt with no GI complaints or issues of concern at this time    RENAL  #Renal artery stenosis  -s/p Left renal artery stent @ [facility] 5/2011  -Has not had follow up since placement  -BUN/Cr 21/1.07; repeat 16/0.88  -Would continue to monitor with daily BMPs    RHEUMATOLOGIC   #Gout  - pt on allopurinol 300mg BID at home  - not in any acute flare here  - hold allopurinol for now    INFECTIOUS DISEASE  Pt afebrile, WBC elevated at 12.08. CXR without consolidation. U/A negative.    FLUIDS/ELECTROLYTES/NUTRITION  -IVF: none, s/p NS @50cc/hr for 4 hrs  -Monitor, Replete to K>4 and Mg>2  -Diet: DASH/TLC    PROPHYLAXIS  -DVT: lovenox  -GI: none  DISPO: CCU  CODE STATUS: FULL

## 2019-10-07 NOTE — H&P ADULT - PROBLEM SELECTOR PLAN 4
-F/U AM lipid panel.  -Continue home statin therapy.     Dispo: Cardiac Catheterization today with Dr. Langston. Echo ordered.     Case reviewed with Dr. Milton and Dr. Langston.

## 2019-10-07 NOTE — CONSULT NOTE ADULT - ASSESSMENT
79 year old male, former smoker, with history of HTN, HLD, renal artery stenosis s/p left renal stent in 2011, CAD s/p MI with thrombectomy and PES (ROBERTA to pLAD and PTCA/ROBERTA to mLAD in 6/2010 with ROBERTA to LCx with tandemheart/LVAD a week later on 6/21/2010 who presented to [facility] ED on 10/7/19 with substernal chest pain/pressure, 5/10, non-radiating that began that morning at approximately 4am without relief.  He reports that over the last month he has had intermittent "indigestion" with spontaneous onset as well as claudication with diminished exercise tolerance for which he was recently evaluated by his PMD, Dr. Patiño, where he had an "abnormal ECHO" and was encouraged to follow-up with cardiology.  In ED, VS stable, EKG showed TWI in II, III  with ST depressions in V4-V6 with negative troponin.  Pain was not relieved with medical therapies and underwent cardiac cath showing LM normal, 99% mLAD, pLCx 100% with L-L collaterals, mRCA 100% with R-R collaterals.  IABP was placed and he was admitted to CCU for further evaluation and management.  Dr. Small, CT surgery, consulted for surgical evaluation.    Plan:  Problem 1: CAD  -Patient seen at bedside with Dr. Small.  To be evaluated for possible CABG, likely Wednesday or Thursday.   -For CAD, if no contraindication, would start ASA, statin, BB.   -Please hold plavix, coumadin, NOAC perioperatively for risk of bleeding.  If AC desired, would start hep gtt.   -Hold ACE/ARBS perioperatively for risk of HD instability.  -IABP to remain in place   -Please ensure the following studies are completed prior to OR date: CBC, CMP, Coags, Lipid Panel, TSH, Hemoglobin A1c, Pro-BNP, Cardiac Enzymes, Type & Screen x 2,  Room air ABG, UA, Carotid US, TTE, CXR Pa/Lat, EKG, Bedside PFTS, LE duplex  -Will continue to follow.     Problem 2: HTN  -Management per primary team    Problem 3: HLD  -Statin, management per primary team    Problem 4: s/p renal stent  -BUN/Cr 21/1.07  -Would continue to monitor on daily labs  -Management per primary team.     I have reviewed clinical labs tests and reports, radiology tests and reports, as well as old patient medical records, and discussed with the referring physician.

## 2019-10-07 NOTE — ED ADULT NURSE REASSESSMENT NOTE - NS ED NURSE REASSESS COMMENT FT1
Patient received at change of shift resting in stretcher in no acute distress.  Respirations unlabored, non-diaphoretic, no pallor.  Patient on cardiac monitor, NSR, VSS.  Given 1SL nitro.  will monitor. Patient received at change of shift resting in stretcher in no acute distress.  Respirations unlabored, non-diaphoretic, no pallor.  Patient on cardiac monitor, NSR, VSS.  patient reports 3/10 chest pain, given 1SL nitro.  will monitor.

## 2019-10-07 NOTE — ED PROVIDER NOTE - CLINICAL SUMMARY MEDICAL DECISION MAKING FREE TEXT BOX
Pt c/o cp, h/o cad, htn, hld, former tob.  EKG w/o stemi but + st depressions new from 2011 ekg.  Sx concerning for acs.  Plan serial ekg, labs, ntg, asa, pt's normal bp meds.  Pt notes back pain but not radiating from chest, no sig htn, cxr w/o significantly widened mediastinum - low concern for dissection; will check ddimer for screening and consider cta if elevated.  No pe risks or pna type sx.  Likely tele admit.

## 2019-10-07 NOTE — H&P ADULT - HISTORY OF PRESENT ILLNESS
80 yo male, former smoker with a PMhx of HTN, hyperlipidemia, Renal artery stenosis s/p Left renal stent placement (5/2011), known CAD s/p MI treated with thrombectomy/ROBERTA pLAD and PTCA/ROBERTA mLAD ([facility] 6/15/2010) followed by ROBERTA to prox/mid/distal LCx with tandemheart LV assist device ([facility]; 6/21/2010) presented to [facility] ED (10/7/2019) this AM with the complaint of 5/10 substernal chest discomfort described as someone "standing on his chest" associated with back ache that began around 4:30 AM this morning. Patient admits in recent weeks he had two episodes of similar chest discomfort. Patient went for his annular PCP evaluation with Dr. Mohsen Patiño three weeks ago and was told his EKG was changed, Echo was abnormal and was referred for cardiology evaluation 10/17/2019. Patient admits he has not seen a cardiologist on approximately 8 years and has not had any recent ischemic work up. Upon arrival to ED; BP: 144/82, HR: 70s, RR: 16, Afebrile, O2: 98F. 12 Lead EKG revealed Sinus Rhythm with TWI in II, III, ST depressions In V4-V6.  Troponin negative x1. Wet read of frontal CXR negative. Patient received  mg x one dose, Norvasc 10 mg x one dose, Coreg 25 mg x one dose and NTG x two doses. Upon assessment of PA, patient still with 2/10 chest discomfort. Patient now presents for cardiac catheterization with possible intervention if clinically indicated due to patient’s risk factors, extensive CAD and unstable angina.     Cardiac Cath Hx    6/15/2010: Severely calcified vessels: LMCA has moderate irregularities, LAD has 90% proximal and 90% mid vessel disease, LCx has 90% proximal, 80% mid and 70% distal disease, RCA is small caliber, non dominant. There is a 70% mid vessel lesion. EF 45%. LVEDP 25 mmHG. Intervention: Successful thrombectomy facilitated PCI to pLAD using River 3.0 x 15 mm ROBERTA. Lesion was postdilated with a Voyager NC 3.0 mm balloon and Successful balloon facilitated PCI to mLAD using River 2.5 x 14 mm ROBERTA. Lesion was postdilated with a Voyager NC 2.5 mm balloon.  6/21/2010: ROBERTA to prox, mid and distal LCx with a TendemHeart LV assist device was placed at the beginning of the procedure due to left dominant anatomy, depressed LV function and complexity of the coronary artery disease. 80 yo male, former smoker with a PMhx of HTN, hyperlipidemia, Renal artery stenosis s/p Left renal stent placement (5/2011), known CAD s/p MI treated with thrombectomy/ROBERTA pLAD and PTCA/ROBERTA mLAD ([facility] 6/15/2010) followed by ROBERTA to prox/mid/distal LCx with tandemheart LV assist device ([facility]; 6/21/2010) presented to [facility] ED (10/7/2019) this AM with the complaint of 5/10 substernal chest discomfort described as someone "standing on his chest" associated with back ache that began around 4:30 AM this morning. Patient admits in recent weeks he had two episodes of similar chest discomfort. He denies palpitations, dizziness, syncope, headache, leg edema, CAMACHO/SOB. Patient went for his annular PCP evaluation with Dr. Mohsen Patiño three weeks ago and was told his EKG was changed, Echo was abnormal and was referred for cardiology evaluation 10/17/2019. Patient admits he has not seen a cardiologist in approximately 8 years and has not had any recent ischemic work up. Upon arrival to ED; BP: 144/82, HR: 70s, RR: 16, Afebrile, O2: 98F. 12 Lead EKG revealed Sinus Rhythm with TWI in II, III, ST depressions In V4-V6.  Troponin negative x1. Wet read of frontal CXR negative. Patient received  mg x one dose, Norvasc 10 mg x one dose, Coreg 25 mg x one dose and NTG x two doses. Upon assessment of PA, patient still with 2/10 chest discomfort. Patient now presents for cardiac catheterization with possible intervention if clinically indicated due to patient’s risk factors, extensive CAD and unstable angina.     Cardiac Cath Hx    6/15/2010: Severely calcified vessels: LMCA has moderate irregularities, LAD has 90% proximal and 90% mid vessel disease, LCx has 90% proximal, 80% mid and 70% distal disease, RCA is small caliber, non dominant. There is a 70% mid vessel lesion. EF 45%. LVEDP 25 mmHG. Intervention: Successful thrombectomy facilitated PCI to pLAD using [place] 3.0 x 15 mm ROBERTA. Lesion was postdilated with a Voyager NC 3.0 mm balloon and Successful balloon facilitated PCI to mLAD using [place] 2.5 x 14 mm ROBERTA. Lesion was postdilated with a Voyager NC 2.5 mm balloon.  6/21/2010: ROBERTA to prox, mid and distal LCx with a TendemHeart LV assist device was placed at the beginning of the procedure due to left dominant anatomy, depressed LV function and complexity of the coronary artery disease.

## 2019-10-07 NOTE — H&P ADULT - PROBLEM SELECTOR PLAN 2
-s/p Left renal artery stent @ [facility] 5/2011  -Has not had follow up since placement

## 2019-10-07 NOTE — CONSULT NOTE ADULT - SUBJECTIVE AND OBJECTIVE BOX
Surgeon: Dr. Small    Requesting Physician: Dr. Langston    HISTORY OF PRESENT ILLNESS:  This is a 79 year old male, former smoker, with history of HTN, HLD, renal artery stenosis s/p left renal stent in 2011, CAD s/p MI with thrombectomy and PES (ROBERTA to pLAD and PTCA/ROBERTA to mLAD in 6/2010 with ROBERTA to LCx with tandemheart/LVAD a week later on 6/21/2010 who presented to [facility] ED on 10/7/19 with substernal chest pain/pressure, 5/10, non-radiating that began that morning at approximately 4am without relief.  He reports that over the last month he has had intermittent "indigestion" with spontaneous onset as well as claudication with diminished exercise tolerance for which he was recently evaluated by his PMD, Dr. Patiño, where he had an "abnormal ECHO" and was encouraged to follow-up with cardiology.  In ED, VS stable, EKG showed TWI in II, III  with ST depressions in V4-V6 with negative troponin.  Pain was not relieved with medical therapies and underwent cardiac cath showing LM normal, 99% mLAD, pLCx 100% with L-L collaterals, mRCA 100% with R-R collaterals.  IABP was placed and he was admitted to CCU for further evaluation and management.  Dr. Small, CT surgery, consulted for surgical evaluation.  At bedside, patient continues to have mild chest discomfort which has improved since admission.  Denies HA, AMS, palpitations, SOB, cough, hemoptysis, n/v/d, fever.     PAST MEDICAL & SURGICAL HISTORY:  Gout  HLD (hyperlipidemia)  HTN (hypertension)  Renal artery stenosis  CAD (coronary artery disease)  H/O renal artery stenosis: s/p stent  S/P coronary artery stent placement    MEDICATIONS  (STANDING):  sodium chloride 0.9%. 500 milliLiter(s) (50 mL/Hr) IV Continuous <Continuous>    MEDICATIONS  (PRN):    Allergies    penicillin (Unknown)    Intolerances    SOCIAL HISTORY:  Smoker: FORMER- 14 PACK YEARS, QUIT 50 YEARS AGO  ETOH use: NO  Ilicit Drug use: NO  Occupation: Retired   Assisted device use (Cane / Walker): No  Live with: Wife, 5 stairs at home    FAMILY HISTORY:    Review of Systems:  CONSTITUTIONAL: Denies fevers / chills, sweats, fatigue, weight loss, weight gain                                       NEURO:  Denies parathesias, seizures, syncope, confusion                                                                                  EYES:  Denies blurry vision, discharge, pain, loss of vision                                                                                    ENMT:  Denies difficulty hearing, vertigo, dysphagia, epistaxis, recent dental work                                       CV:  +CP, claudication, see HPI; Denies palpitations, CAMACHO, orthopnea                                                                                           RESPIRATORY:  Denies Wheezing, SOB, cough / sputum, hemoptysis                                                               GI:  Denies nausea, vomiting, diarrhea, constipation, melena                                                                          : Denies hematuria, dysuria, urgency, incontinence                                                                                          MUSCULOSKELETAL:  Denies arthritis, joint swelling, muscle weakness                                                             SKIN/BREAST:  Denies rash, itching, hair loss, masses                                                                                              PSYCH:  Denies depression, anxiety, suicidal ideation                                                                                                HEME/LYMPH:  Denies bruises easily, enlarged lymph nodes, tender lymph nodes                                          ENDOCRINE:  Denies cold intolerance, heat intolerance, polydipsia                                                                      Vital Signs Last 24 Hrs  T(C): 36.7 (07 Oct 2019 11:40), Max: 36.8 (07 Oct 2019 11:22)  T(F): 98 (07 Oct 2019 11:40), Max: 98.3 (07 Oct 2019 11:22)  HR: 62 (07 Oct 2019 19:30) (62 - 77)  BP: 132/65 (07 Oct 2019 19:30) (106/43 - 148/74)  BP(mean): 98 (07 Oct 2019 19:30) (61 - 102)  RR: 17 (07 Oct 2019 19:30) (16 - 21)  SpO2: 94% (07 Oct 2019 19:30) (94% - 100%)    Physical Exam:  CONSTITUTIONAL: Resting in bed, no acute distress.   NEURO: AAOx3, no AMS or focal deficits.                       EYES: EOMI b/l, PEERLA b/l. Exhibits appropriate visual acuity.   ENMT: Hearing in tact.  MMM.  No palpable neck masses or hoarseness.   CV: RRR, S1/S2, no m/r/g. Pulses 2+ throughout. IABP in L groin, no hematoma.   RESPIRATORY:  No acute distress.  CTA b/l, no w/r/r.   GI: ND, NBS, non-TTP.  : No clark.    MUSCULOSKELETAL: No obvious malformations.  Non-TTP.  Exhibits normal ROM in all extremities.   SKIN / BREAST:  No obvious lesions/abrasions noted.  Normal skin color and turgor.                                                           LABS:                        14.4   8.96  )-----------( 160      ( 07 Oct 2019 06:50 )             41.0     10-07    136  |  100  |  21  ----------------------------<  106<H>  4.3   |  22  |  1.07    Ca    9.1      07 Oct 2019 06:50  Mg     1.7     10-07    TPro  7.1  /  Alb  4.2  /  TBili  0.4  /  DBili  x   /  AST  20  /  ALT  20  /  AlkPhos  67  10-07    PT/INR - ( 07 Oct 2019 06:50 )   PT: 12.6 sec;   INR: 1.11          PTT - ( 07 Oct 2019 06:50 )  PTT:34.3 sec    CARDIAC MARKERS ( 07 Oct 2019 06:50 )  x     / <0.01 ng/mL / 114 U/L / x     / 2.8 ng/mL    RADIOLOGY & ADDITIONAL STUDIES:  CAROTID U/S: pending    CXR:    < from: Xray Chest 1 View- PORTABLE-Urgent (10.07.19 @ 07:43) >    EXAM:  XR CHEST PORTABLE URGENT 1V                          PROCEDURE DATE:  10/07/2019          INTERPRETATION:  Portable chest    History: Chest pain    Impression:    Minimal focal atelectasis right lower lung. Lungs otherwise clear. No   lung consolidation or pleural effusion. No pneumothorax. Unremarkable   cardiac silhouette.    < end of copied text >    EKG: in chart     TTE / HIRA: pending    Cardiac Cath: see HPI

## 2019-10-08 ENCOUNTER — APPOINTMENT (OUTPATIENT)
Dept: CARDIOTHORACIC SURGERY | Facility: HOSPITAL | Age: 79
End: 2019-10-08
Payer: MEDICARE

## 2019-10-08 PROBLEM — I10 ESSENTIAL (PRIMARY) HYPERTENSION: Chronic | Status: ACTIVE | Noted: 2019-10-07

## 2019-10-08 PROBLEM — I25.10 ATHEROSCLEROTIC HEART DISEASE OF NATIVE CORONARY ARTERY WITHOUT ANGINA PECTORIS: Chronic | Status: ACTIVE | Noted: 2019-10-07

## 2019-10-08 PROBLEM — E78.5 HYPERLIPIDEMIA, UNSPECIFIED: Chronic | Status: ACTIVE | Noted: 2019-10-07

## 2019-10-08 PROBLEM — M10.9 GOUT, UNSPECIFIED: Chronic | Status: ACTIVE | Noted: 2019-10-07

## 2019-10-08 PROBLEM — I70.1 ATHEROSCLEROSIS OF RENAL ARTERY: Chronic | Status: ACTIVE | Noted: 2019-10-07

## 2019-10-08 PROBLEM — Z00.00 ENCOUNTER FOR PREVENTIVE HEALTH EXAMINATION: Status: ACTIVE | Noted: 2019-10-08

## 2019-10-08 LAB
ALBUMIN SERPL ELPH-MCNC: 2.5 G/DL — LOW (ref 3.3–5)
ALBUMIN SERPL ELPH-MCNC: 3.4 G/DL — SIGNIFICANT CHANGE UP (ref 3.3–5)
ALBUMIN SERPL ELPH-MCNC: 3.9 G/DL — SIGNIFICANT CHANGE UP (ref 3.3–5)
ALP SERPL-CCNC: 34 U/L — LOW (ref 40–120)
ALP SERPL-CCNC: 41 U/L — SIGNIFICANT CHANGE UP (ref 40–120)
ALP SERPL-CCNC: 43 U/L — SIGNIFICANT CHANGE UP (ref 40–120)
ALT FLD-CCNC: 10 U/L — SIGNIFICANT CHANGE UP (ref 10–45)
ALT FLD-CCNC: 8 U/L — LOW (ref 10–45)
ALT FLD-CCNC: 9 U/L — LOW (ref 10–45)
ANION GAP SERPL CALC-SCNC: 11 MMOL/L — SIGNIFICANT CHANGE UP (ref 5–17)
ANION GAP SERPL CALC-SCNC: 11 MMOL/L — SIGNIFICANT CHANGE UP (ref 5–17)
ANION GAP SERPL CALC-SCNC: 13 MMOL/L — SIGNIFICANT CHANGE UP (ref 5–17)
ANION GAP SERPL CALC-SCNC: 14 MMOL/L — SIGNIFICANT CHANGE UP (ref 5–17)
APPEARANCE UR: CLEAR — SIGNIFICANT CHANGE UP
APTT BLD: 25.3 SEC — LOW (ref 27.5–36.3)
APTT BLD: 27.9 SEC — SIGNIFICANT CHANGE UP (ref 27.5–36.3)
APTT BLD: 31.4 SEC — SIGNIFICANT CHANGE UP (ref 27.5–36.3)
APTT BLD: 40.2 SEC — HIGH (ref 27.5–36.3)
AST SERPL-CCNC: 12 U/L — SIGNIFICANT CHANGE UP (ref 10–40)
AST SERPL-CCNC: 15 U/L — SIGNIFICANT CHANGE UP (ref 10–40)
AST SERPL-CCNC: 17 U/L — SIGNIFICANT CHANGE UP (ref 10–40)
BASE EXCESS BLDA CALC-SCNC: -0.2 MMOL/L — SIGNIFICANT CHANGE UP (ref -2–3)
BASOPHILS # BLD AUTO: 0.06 K/UL — SIGNIFICANT CHANGE UP (ref 0–0.2)
BASOPHILS NFR BLD AUTO: 0.3 % — SIGNIFICANT CHANGE UP (ref 0–2)
BILIRUB SERPL-MCNC: 1 MG/DL — SIGNIFICANT CHANGE UP (ref 0.2–1.2)
BILIRUB SERPL-MCNC: 1 MG/DL — SIGNIFICANT CHANGE UP (ref 0.2–1.2)
BILIRUB SERPL-MCNC: 2.1 MG/DL — HIGH (ref 0.2–1.2)
BILIRUB UR-MCNC: NEGATIVE — SIGNIFICANT CHANGE UP
BUN SERPL-MCNC: 12 MG/DL — SIGNIFICANT CHANGE UP (ref 7–23)
BUN SERPL-MCNC: 12 MG/DL — SIGNIFICANT CHANGE UP (ref 7–23)
BUN SERPL-MCNC: 13 MG/DL — SIGNIFICANT CHANGE UP (ref 7–23)
BUN SERPL-MCNC: 14 MG/DL — SIGNIFICANT CHANGE UP (ref 7–23)
CALCIUM SERPL-MCNC: 7.5 MG/DL — LOW (ref 8.4–10.5)
CALCIUM SERPL-MCNC: 7.7 MG/DL — LOW (ref 8.4–10.5)
CALCIUM SERPL-MCNC: 8.6 MG/DL — SIGNIFICANT CHANGE UP (ref 8.4–10.5)
CALCIUM SERPL-MCNC: 9.3 MG/DL — SIGNIFICANT CHANGE UP (ref 8.4–10.5)
CHLORIDE SERPL-SCNC: 101 MMOL/L — SIGNIFICANT CHANGE UP (ref 96–108)
CHLORIDE SERPL-SCNC: 102 MMOL/L — SIGNIFICANT CHANGE UP (ref 96–108)
CO2 SERPL-SCNC: 21 MMOL/L — LOW (ref 22–31)
CO2 SERPL-SCNC: 22 MMOL/L — SIGNIFICANT CHANGE UP (ref 22–31)
CO2 SERPL-SCNC: 23 MMOL/L — SIGNIFICANT CHANGE UP (ref 22–31)
CO2 SERPL-SCNC: 24 MMOL/L — SIGNIFICANT CHANGE UP (ref 22–31)
COLOR SPEC: YELLOW — SIGNIFICANT CHANGE UP
CREAT SERPL-MCNC: 0.73 MG/DL — SIGNIFICANT CHANGE UP (ref 0.5–1.3)
CREAT SERPL-MCNC: 0.82 MG/DL — SIGNIFICANT CHANGE UP (ref 0.5–1.3)
CREAT SERPL-MCNC: 0.83 MG/DL — SIGNIFICANT CHANGE UP (ref 0.5–1.3)
CREAT SERPL-MCNC: 0.93 MG/DL — SIGNIFICANT CHANGE UP (ref 0.5–1.3)
DIFF PNL FLD: ABNORMAL
EOSINOPHIL # BLD AUTO: 0.21 K/UL — SIGNIFICANT CHANGE UP (ref 0–0.5)
EOSINOPHIL NFR BLD AUTO: 1 % — SIGNIFICANT CHANGE UP (ref 0–6)
FIBRINOGEN PPP-MCNC: 217 MG/DL — LOW (ref 258–438)
GAS PNL BLDA: SIGNIFICANT CHANGE UP
GLUCOSE BLDC GLUCOMTR-MCNC: 173 MG/DL — HIGH (ref 70–99)
GLUCOSE BLDC GLUCOMTR-MCNC: 191 MG/DL — HIGH (ref 70–99)
GLUCOSE SERPL-MCNC: 133 MG/DL — HIGH (ref 70–99)
GLUCOSE SERPL-MCNC: 159 MG/DL — HIGH (ref 70–99)
GLUCOSE SERPL-MCNC: 182 MG/DL — HIGH (ref 70–99)
GLUCOSE SERPL-MCNC: 99 MG/DL — SIGNIFICANT CHANGE UP (ref 70–99)
GLUCOSE UR QL: NEGATIVE — SIGNIFICANT CHANGE UP
HCO3 BLDA-SCNC: 22 MMOL/L — SIGNIFICANT CHANGE UP (ref 21–28)
HCT VFR BLD CALC: 15.2 % — CRITICAL LOW (ref 39–50)
HCT VFR BLD CALC: 27.9 % — LOW (ref 39–50)
HCT VFR BLD CALC: 30.2 % — LOW (ref 39–50)
HCT VFR BLD CALC: 39.7 % — SIGNIFICANT CHANGE UP (ref 39–50)
HCV AB S/CO SERPL IA: 0.09 S/CO — SIGNIFICANT CHANGE UP
HCV AB SERPL-IMP: SIGNIFICANT CHANGE UP
HGB BLD-MCNC: 10.4 G/DL — LOW (ref 13–17)
HGB BLD-MCNC: 14.1 G/DL — SIGNIFICANT CHANGE UP (ref 13–17)
HGB BLD-MCNC: 5.2 G/DL — CRITICAL LOW (ref 13–17)
HGB BLD-MCNC: 9.6 G/DL — LOW (ref 13–17)
HIV 1+2 AB+HIV1 P24 AG SERPL QL IA: SIGNIFICANT CHANGE UP
IMM GRANULOCYTES NFR BLD AUTO: 1.6 % — HIGH (ref 0–1.5)
INR BLD: 1.3 — HIGH (ref 0.88–1.16)
INR BLD: 1.52 — HIGH (ref 0.88–1.16)
INR BLD: 1.53 — HIGH (ref 0.88–1.16)
KETONES UR-MCNC: NEGATIVE — SIGNIFICANT CHANGE UP
LACTATE SERPL-SCNC: 1.8 MMOL/L — SIGNIFICANT CHANGE UP (ref 0.5–2)
LACTATE SERPL-SCNC: 1.9 MMOL/L — SIGNIFICANT CHANGE UP (ref 0.5–2)
LACTATE SERPL-SCNC: 4 MMOL/L — CRITICAL HIGH (ref 0.5–2)
LEUKOCYTE ESTERASE UR-ACNC: NEGATIVE — SIGNIFICANT CHANGE UP
LYMPHOCYTES # BLD AUTO: 1.26 K/UL — SIGNIFICANT CHANGE UP (ref 1–3.3)
LYMPHOCYTES # BLD AUTO: 5.8 % — LOW (ref 13–44)
MAGNESIUM SERPL-MCNC: 1.3 MG/DL — LOW (ref 1.6–2.6)
MAGNESIUM SERPL-MCNC: 1.3 MG/DL — LOW (ref 1.6–2.6)
MAGNESIUM SERPL-MCNC: 1.4 MG/DL — LOW (ref 1.6–2.6)
MAGNESIUM SERPL-MCNC: 1.7 MG/DL — SIGNIFICANT CHANGE UP (ref 1.6–2.6)
MCHC RBC-ENTMCNC: 31.6 PG — SIGNIFICANT CHANGE UP (ref 27–34)
MCHC RBC-ENTMCNC: 32.8 PG — SIGNIFICANT CHANGE UP (ref 27–34)
MCHC RBC-ENTMCNC: 32.8 PG — SIGNIFICANT CHANGE UP (ref 27–34)
MCHC RBC-ENTMCNC: 34.2 GM/DL — SIGNIFICANT CHANGE UP (ref 32–36)
MCHC RBC-ENTMCNC: 34.2 PG — HIGH (ref 27–34)
MCHC RBC-ENTMCNC: 34.4 GM/DL — SIGNIFICANT CHANGE UP (ref 32–36)
MCHC RBC-ENTMCNC: 34.4 GM/DL — SIGNIFICANT CHANGE UP (ref 32–36)
MCHC RBC-ENTMCNC: 35.5 GM/DL — SIGNIFICANT CHANGE UP (ref 32–36)
MCV RBC AUTO: 100 FL — SIGNIFICANT CHANGE UP (ref 80–100)
MCV RBC AUTO: 91.8 FL — SIGNIFICANT CHANGE UP (ref 80–100)
MCV RBC AUTO: 92.3 FL — SIGNIFICANT CHANGE UP (ref 80–100)
MCV RBC AUTO: 95.2 FL — SIGNIFICANT CHANGE UP (ref 80–100)
MONOCYTES # BLD AUTO: 1.34 K/UL — HIGH (ref 0–0.9)
MONOCYTES NFR BLD AUTO: 6.2 % — SIGNIFICANT CHANGE UP (ref 2–14)
NEUTROPHILS # BLD AUTO: 18.56 K/UL — HIGH (ref 1.8–7.4)
NEUTROPHILS NFR BLD AUTO: 85.1 % — HIGH (ref 43–77)
NITRITE UR-MCNC: NEGATIVE — SIGNIFICANT CHANGE UP
NRBC # BLD: 0 /100 WBCS — SIGNIFICANT CHANGE UP (ref 0–0)
PCO2 BLDA: 31 MMHG — LOW (ref 35–48)
PH BLDA: 7.48 — HIGH (ref 7.35–7.45)
PH UR: 7 — SIGNIFICANT CHANGE UP (ref 5–8)
PHOSPHATE SERPL-MCNC: 3.4 MG/DL — SIGNIFICANT CHANGE UP (ref 2.5–4.5)
PHOSPHATE SERPL-MCNC: 3.5 MG/DL — SIGNIFICANT CHANGE UP (ref 2.5–4.5)
PHOSPHATE SERPL-MCNC: 3.5 MG/DL — SIGNIFICANT CHANGE UP (ref 2.5–4.5)
PLATELET # BLD AUTO: 118 K/UL — LOW (ref 150–400)
PLATELET # BLD AUTO: 140 K/UL — LOW (ref 150–400)
PLATELET # BLD AUTO: 148 K/UL — LOW (ref 150–400)
PLATELET # BLD AUTO: 166 K/UL — SIGNIFICANT CHANGE UP (ref 150–400)
PO2 BLDA: 190 MMHG — HIGH (ref 83–108)
POTASSIUM SERPL-MCNC: 4.1 MMOL/L — SIGNIFICANT CHANGE UP (ref 3.5–5.3)
POTASSIUM SERPL-MCNC: 4.2 MMOL/L — SIGNIFICANT CHANGE UP (ref 3.5–5.3)
POTASSIUM SERPL-MCNC: 4.3 MMOL/L — SIGNIFICANT CHANGE UP (ref 3.5–5.3)
POTASSIUM SERPL-MCNC: 4.4 MMOL/L — SIGNIFICANT CHANGE UP (ref 3.5–5.3)
POTASSIUM SERPL-SCNC: 4.1 MMOL/L — SIGNIFICANT CHANGE UP (ref 3.5–5.3)
POTASSIUM SERPL-SCNC: 4.2 MMOL/L — SIGNIFICANT CHANGE UP (ref 3.5–5.3)
POTASSIUM SERPL-SCNC: 4.3 MMOL/L — SIGNIFICANT CHANGE UP (ref 3.5–5.3)
POTASSIUM SERPL-SCNC: 4.4 MMOL/L — SIGNIFICANT CHANGE UP (ref 3.5–5.3)
PROT SERPL-MCNC: 4 G/DL — LOW (ref 6–8.3)
PROT SERPL-MCNC: 4.6 G/DL — LOW (ref 6–8.3)
PROT SERPL-MCNC: 5.6 G/DL — LOW (ref 6–8.3)
PROT UR-MCNC: NEGATIVE MG/DL — SIGNIFICANT CHANGE UP
PROTHROM AB SERPL-ACNC: 14.8 SEC — HIGH (ref 10–12.9)
PROTHROM AB SERPL-ACNC: 17.4 SEC — HIGH (ref 10–12.9)
PROTHROM AB SERPL-ACNC: 17.5 SEC — HIGH (ref 10–12.9)
RBC # BLD: 1.52 M/UL — LOW (ref 4.2–5.8)
RBC # BLD: 2.93 M/UL — LOW (ref 4.2–5.8)
RBC # BLD: 3.29 M/UL — LOW (ref 4.2–5.8)
RBC # BLD: 4.3 M/UL — SIGNIFICANT CHANGE UP (ref 4.2–5.8)
RBC # FLD: 14.7 % — HIGH (ref 10.3–14.5)
RBC # FLD: 14.9 % — HIGH (ref 10.3–14.5)
RBC # FLD: 15.2 % — HIGH (ref 10.3–14.5)
RBC # FLD: 15.4 % — HIGH (ref 10.3–14.5)
SAO2 % BLDA: 99 % — SIGNIFICANT CHANGE UP (ref 95–100)
SODIUM SERPL-SCNC: 134 MMOL/L — LOW (ref 135–145)
SODIUM SERPL-SCNC: 136 MMOL/L — SIGNIFICANT CHANGE UP (ref 135–145)
SODIUM SERPL-SCNC: 136 MMOL/L — SIGNIFICANT CHANGE UP (ref 135–145)
SODIUM SERPL-SCNC: 138 MMOL/L — SIGNIFICANT CHANGE UP (ref 135–145)
SP GR SPEC: 1.01 — SIGNIFICANT CHANGE UP (ref 1–1.03)
TROPONIN T SERPL-MCNC: 0.03 NG/ML — HIGH (ref 0–0.01)
UROBILINOGEN FLD QL: 0.2 E.U./DL — SIGNIFICANT CHANGE UP
WBC # BLD: 11.41 K/UL — HIGH (ref 3.8–10.5)
WBC # BLD: 12.23 K/UL — HIGH (ref 3.8–10.5)
WBC # BLD: 14.43 K/UL — HIGH (ref 3.8–10.5)
WBC # BLD: 21.78 K/UL — HIGH (ref 3.8–10.5)
WBC # FLD AUTO: 11.41 K/UL — HIGH (ref 3.8–10.5)
WBC # FLD AUTO: 12.23 K/UL — HIGH (ref 3.8–10.5)
WBC # FLD AUTO: 14.43 K/UL — HIGH (ref 3.8–10.5)
WBC # FLD AUTO: 21.78 K/UL — HIGH (ref 3.8–10.5)

## 2019-10-08 PROCEDURE — 99292 CRITICAL CARE ADDL 30 MIN: CPT

## 2019-10-08 PROCEDURE — 93306 TTE W/DOPPLER COMPLETE: CPT | Mod: 26

## 2019-10-08 PROCEDURE — 93880 EXTRACRANIAL BILAT STUDY: CPT | Mod: 26

## 2019-10-08 PROCEDURE — 33517 CABG ARTERY-VEIN SINGLE: CPT

## 2019-10-08 PROCEDURE — 99291 CRITICAL CARE FIRST HOUR: CPT

## 2019-10-08 PROCEDURE — 93010 ELECTROCARDIOGRAM REPORT: CPT | Mod: 77

## 2019-10-08 PROCEDURE — 35820 EXPLORE CHEST VESSELS: CPT | Mod: 78

## 2019-10-08 PROCEDURE — 93970 EXTREMITY STUDY: CPT | Mod: 26

## 2019-10-08 PROCEDURE — 71045 X-RAY EXAM CHEST 1 VIEW: CPT | Mod: 26

## 2019-10-08 PROCEDURE — 99232 SBSQ HOSP IP/OBS MODERATE 35: CPT

## 2019-10-08 PROCEDURE — 33533 CABG ARTERIAL SINGLE: CPT | Mod: 80

## 2019-10-08 PROCEDURE — 33533 CABG ARTERIAL SINGLE: CPT

## 2019-10-08 PROCEDURE — 33517 CABG ARTERY-VEIN SINGLE: CPT | Mod: 80

## 2019-10-08 PROCEDURE — 76998 US GUIDE INTRAOP: CPT | Mod: 26,59

## 2019-10-08 PROCEDURE — 93010 ELECTROCARDIOGRAM REPORT: CPT

## 2019-10-08 RX ORDER — CHLORHEXIDINE GLUCONATE 213 G/1000ML
5 SOLUTION TOPICAL EVERY 4 HOURS
Refills: 0 | Status: DISCONTINUED | OUTPATIENT
Start: 2019-10-08 | End: 2019-10-09

## 2019-10-08 RX ORDER — ASPIRIN/CALCIUM CARB/MAGNESIUM 324 MG
81 TABLET ORAL DAILY
Refills: 0 | Status: DISCONTINUED | OUTPATIENT
Start: 2019-10-08 | End: 2019-10-17

## 2019-10-08 RX ORDER — FENTANYL CITRATE 50 UG/ML
25 INJECTION INTRAVENOUS
Refills: 0 | Status: DISCONTINUED | OUTPATIENT
Start: 2019-10-08 | End: 2019-10-10

## 2019-10-08 RX ORDER — CISATRACURIUM BESYLATE 2 MG/ML
10 INJECTION INTRAVENOUS ONCE
Refills: 0 | Status: COMPLETED | OUTPATIENT
Start: 2019-10-08 | End: 2019-10-08

## 2019-10-08 RX ORDER — CEFAZOLIN SODIUM 1 G
2000 VIAL (EA) INJECTION EVERY 8 HOURS
Refills: 0 | Status: COMPLETED | OUTPATIENT
Start: 2019-10-08 | End: 2019-10-10

## 2019-10-08 RX ORDER — CHLORHEXIDINE GLUCONATE 213 G/1000ML
1 SOLUTION TOPICAL ONCE
Refills: 0 | Status: DISCONTINUED | OUTPATIENT
Start: 2019-10-08 | End: 2019-10-08

## 2019-10-08 RX ORDER — CHLORHEXIDINE GLUCONATE 213 G/1000ML
10 SOLUTION TOPICAL ONCE
Refills: 0 | Status: DISCONTINUED | OUTPATIENT
Start: 2019-10-08 | End: 2019-10-08

## 2019-10-08 RX ORDER — ACETAMINOPHEN 500 MG
1000 TABLET ORAL ONCE
Refills: 0 | Status: COMPLETED | OUTPATIENT
Start: 2019-10-08 | End: 2019-10-09

## 2019-10-08 RX ORDER — MAGNESIUM SULFATE 500 MG/ML
2 VIAL (ML) INJECTION ONCE
Refills: 0 | Status: COMPLETED | OUTPATIENT
Start: 2019-10-08 | End: 2019-10-09

## 2019-10-08 RX ORDER — CEFAZOLIN SODIUM 1 G
2000 VIAL (EA) INJECTION EVERY 8 HOURS
Refills: 0 | Status: DISCONTINUED | OUTPATIENT
Start: 2019-10-08 | End: 2019-10-08

## 2019-10-08 RX ORDER — CHLORHEXIDINE GLUCONATE 213 G/1000ML
1 SOLUTION TOPICAL DAILY
Refills: 0 | Status: DISCONTINUED | OUTPATIENT
Start: 2019-10-08 | End: 2019-10-14

## 2019-10-08 RX ORDER — CISATRACURIUM BESYLATE 2 MG/ML
2 INJECTION INTRAVENOUS ONCE
Refills: 0 | Status: DISCONTINUED | OUTPATIENT
Start: 2019-10-08 | End: 2019-10-08

## 2019-10-08 RX ORDER — CHLORHEXIDINE GLUCONATE 213 G/1000ML
15 SOLUTION TOPICAL EVERY 12 HOURS
Refills: 0 | Status: DISCONTINUED | OUTPATIENT
Start: 2019-10-08 | End: 2019-10-09

## 2019-10-08 RX ORDER — SODIUM CHLORIDE 9 MG/ML
1000 INJECTION INTRAMUSCULAR; INTRAVENOUS; SUBCUTANEOUS
Refills: 0 | Status: DISCONTINUED | OUTPATIENT
Start: 2019-10-08 | End: 2019-10-14

## 2019-10-08 RX ORDER — HEPARIN SODIUM 5000 [USP'U]/ML
5000 INJECTION INTRAVENOUS; SUBCUTANEOUS EVERY 8 HOURS
Refills: 0 | Status: DISCONTINUED | OUTPATIENT
Start: 2019-10-08 | End: 2019-10-15

## 2019-10-08 RX ORDER — DEXTROSE 50 % IN WATER 50 %
50 SYRINGE (ML) INTRAVENOUS
Refills: 0 | Status: DISCONTINUED | OUTPATIENT
Start: 2019-10-08 | End: 2019-10-11

## 2019-10-08 RX ORDER — ATORVASTATIN CALCIUM 80 MG/1
80 TABLET, FILM COATED ORAL AT BEDTIME
Refills: 0 | Status: DISCONTINUED | OUTPATIENT
Start: 2019-10-08 | End: 2019-10-17

## 2019-10-08 RX ORDER — ALBUMIN HUMAN 25 %
250 VIAL (ML) INTRAVENOUS
Refills: 0 | Status: COMPLETED | OUTPATIENT
Start: 2019-10-08 | End: 2019-10-08

## 2019-10-08 RX ORDER — HEPARIN SODIUM 5000 [USP'U]/ML
1000 INJECTION INTRAVENOUS; SUBCUTANEOUS
Qty: 25000 | Refills: 0 | Status: DISCONTINUED | OUTPATIENT
Start: 2019-10-08 | End: 2019-10-08

## 2019-10-08 RX ORDER — DEXMEDETOMIDINE HYDROCHLORIDE IN 0.9% SODIUM CHLORIDE 4 UG/ML
0.2 INJECTION INTRAVENOUS
Qty: 200 | Refills: 0 | Status: DISCONTINUED | OUTPATIENT
Start: 2019-10-08 | End: 2019-10-09

## 2019-10-08 RX ORDER — CALCIUM GLUCONATE 100 MG/ML
2 VIAL (ML) INTRAVENOUS ONCE
Refills: 0 | Status: COMPLETED | OUTPATIENT
Start: 2019-10-08 | End: 2019-10-09

## 2019-10-08 RX ORDER — DEXTROSE 50 % IN WATER 50 %
25 SYRINGE (ML) INTRAVENOUS
Refills: 0 | Status: DISCONTINUED | OUTPATIENT
Start: 2019-10-08 | End: 2019-10-11

## 2019-10-08 RX ORDER — CHLORHEXIDINE GLUCONATE 213 G/1000ML
1 SOLUTION TOPICAL
Refills: 0 | Status: DISCONTINUED | OUTPATIENT
Start: 2019-10-08 | End: 2019-10-08

## 2019-10-08 RX ORDER — CLOPIDOGREL BISULFATE 75 MG/1
75 TABLET, FILM COATED ORAL DAILY
Refills: 0 | Status: DISCONTINUED | OUTPATIENT
Start: 2019-10-08 | End: 2019-10-15

## 2019-10-08 RX ORDER — CALCIUM GLUCONATE 100 MG/ML
2 VIAL (ML) INTRAVENOUS ONCE
Refills: 0 | Status: COMPLETED | OUTPATIENT
Start: 2019-10-08 | End: 2019-10-08

## 2019-10-08 RX ORDER — PANTOPRAZOLE SODIUM 20 MG/1
40 TABLET, DELAYED RELEASE ORAL DAILY
Refills: 0 | Status: DISCONTINUED | OUTPATIENT
Start: 2019-10-08 | End: 2019-10-09

## 2019-10-08 RX ORDER — MAGNESIUM SULFATE 500 MG/ML
2 VIAL (ML) INJECTION ONCE
Refills: 0 | Status: COMPLETED | OUTPATIENT
Start: 2019-10-08 | End: 2019-10-08

## 2019-10-08 RX ORDER — INSULIN HUMAN 100 [IU]/ML
1 INJECTION, SOLUTION SUBCUTANEOUS
Qty: 50 | Refills: 0 | Status: DISCONTINUED | OUTPATIENT
Start: 2019-10-08 | End: 2019-10-09

## 2019-10-08 RX ORDER — PHENYLEPHRINE HYDROCHLORIDE 10 MG/ML
0.1 INJECTION INTRAVENOUS
Qty: 40 | Refills: 0 | Status: DISCONTINUED | OUTPATIENT
Start: 2019-10-08 | End: 2019-10-09

## 2019-10-08 RX ORDER — PROPOFOL 10 MG/ML
10 INJECTION, EMULSION INTRAVENOUS
Qty: 1000 | Refills: 0 | Status: DISCONTINUED | OUTPATIENT
Start: 2019-10-08 | End: 2019-10-09

## 2019-10-08 RX ORDER — METOPROLOL TARTRATE 50 MG
12.5 TABLET ORAL EVERY 12 HOURS
Refills: 0 | Status: DISCONTINUED | OUTPATIENT
Start: 2019-10-08 | End: 2019-10-08

## 2019-10-08 RX ADMIN — Medication 50 GRAM(S): at 07:48

## 2019-10-08 RX ADMIN — CHLORHEXIDINE GLUCONATE 1 APPLICATION(S): 213 SOLUTION TOPICAL at 12:28

## 2019-10-08 RX ADMIN — Medication 125 MILLILITER(S): at 19:41

## 2019-10-08 RX ADMIN — FENTANYL CITRATE 25 MICROGRAM(S): 50 INJECTION INTRAVENOUS at 23:15

## 2019-10-08 RX ADMIN — CISATRACURIUM BESYLATE 10 MILLIGRAM(S): 2 INJECTION INTRAVENOUS at 20:45

## 2019-10-08 RX ADMIN — HEPARIN SODIUM 10 UNIT(S)/HR: 5000 INJECTION INTRAVENOUS; SUBCUTANEOUS at 11:11

## 2019-10-08 RX ADMIN — ENOXAPARIN SODIUM 40 MILLIGRAM(S): 100 INJECTION SUBCUTANEOUS at 05:48

## 2019-10-08 RX ADMIN — PHENYLEPHRINE HYDROCHLORIDE 3.15 MICROGRAM(S)/KG/MIN: 10 INJECTION INTRAVENOUS at 20:31

## 2019-10-08 RX ADMIN — Medication 12.5 MILLIGRAM(S): at 05:48

## 2019-10-08 RX ADMIN — PROPOFOL 5.05 MICROGRAM(S)/KG/MIN: 10 INJECTION, EMULSION INTRAVENOUS at 20:31

## 2019-10-08 RX ADMIN — Medication 81 MILLIGRAM(S): at 06:43

## 2019-10-08 NOTE — PROGRESS NOTE ADULT - SUBJECTIVE AND OBJECTIVE BOX
TRANSFER NOTE  Hospital course:  79 year old male, former smoker, with history of HTN, HLD, renal artery stenosis s/p left renal stent in , CAD s/p MI with thrombectomy and multiple stents placed, who presented to [facility] ED on 10/7/19 with 5/10 substernal chest pain/pressure, non-radiating that began at approximately 4am. In ED, VS stable, EKG showed TWI in II, III  with ST depressions in V4-V6 with negative troponin.  Pain was not relieved with medical therapies and underwent cardiac cath showing LM normal, 99% mLAD, pLCx 100% with L-L collaterals, mRCA 100% with R-R collaterals.  IABP was placed 10/7 and he was admitted to CCU for further evaluation and management.  Dr. Small of CT surgery was consulted for CABG evaluation.    OVERNIGHT EVENTS:  No acute events overnight.    SUBJECTIVE / INTERVAL HPI: Patient seen and examined at bedside. Pt endorses some mild, intermittent chest pain, decreased from admission. Otherwise denies any complaints of SOB, abdominal pain, N/V/D.    VITAL SIGNS:  Vital Signs Last 24 Hrs  T(C): 36.6 (08 Oct 2019 11:00), Max: 37 (07 Oct 2019 20:50)  T(F): 97.8 (08 Oct 2019 11:00), Max: 98.6 (07 Oct 2019 20:50)  HR: 64 (08 Oct 2019 11:00) (58 - 70)  BP: 127/58 (08 Oct 2019 11:00) (93/54 - 154/65)  BP(mean): 93 (08 Oct 2019 11:00) (61 - 98)  RR: 16 (08 Oct 2019 11:00) (12 - 21)  SpO2: 95% (08 Oct 2019 11:00) (93% - 96%)    PHYSICAL EXAM:    General: Lying comfortably in bed, NAD  HEENT: NC/AT, PERRL, anicteric sclera; MMM  Neck: supple  Cardiovascular: Unable to appreciate heart sounds, IABP machine at bedside  Respiratory: CTA B/L, no W/R/R  Gastrointestinal: obese, soft, nontender, +BS  Extremities: WWP, no edema or cyanosis, right groin access  Vascular: 2+ radial, 1+ DP/PT pulses B/L  Neurological: AAOx3, no focal deficits    MEDICATIONS:  MEDICATIONS  (STANDING):  aspirin enteric coated 81 milliGRAM(s) Oral every 24 hours  atorvastatin 80 milliGRAM(s) Oral at bedtime  chlorhexidine 0.12% Liquid 10 milliLiter(s) Swish and Spit once  chlorhexidine 2% Cloths 1 Application(s) Topical <User Schedule>  chlorhexidine 4% Liquid 1 Application(s) Topical once  chlorhexidine 4% Liquid 1 Application(s) Topical once  heparin  Infusion 1000 Unit(s)/Hr (10 mL/Hr) IV Continuous <Continuous>  influenza   Vaccine 0.5 milliLiter(s) IntraMuscular once  metoprolol tartrate 12.5 milliGRAM(s) Oral every 12 hours    MEDICATIONS  (PRN):      ALLERGIES:  Allergies    penicillin (Unknown)    Intolerances        LABS:                        14.1   11.41 )-----------( 148      ( 08 Oct 2019 04:17 )             39.7     10-    136  |  101  |  13  ----------------------------<  99  4.1   |  24  |  0.82    Ca    9.3      08 Oct 2019 04:17  Mg     1.7     10-08    TPro  7.2  /  Alb  4.1  /  TBili  1.0  /  DBili  x   /  AST  23  /  ALT  18  /  AlkPhos  72  10-07    PT/INR - ( 07 Oct 2019 22:05 )   PT: 13.6 sec;   INR: 1.20          PTT - ( 08 Oct 2019 10:42 )  PTT:27.9 sec  Urinalysis Basic - ( 07 Oct 2019 23:31 )    Color: Yellow / Appearance: Clear / S.010 / pH: x  Gluc: x / Ketone: NEGATIVE  / Bili: Negative / Urobili: 0.2 E.U./dL   Blood: x / Protein: NEGATIVE mg/dL / Nitrite: NEGATIVE   Leuk Esterase: NEGATIVE / RBC: < 5 /HPF / WBC < 5 /HPF   Sq Epi: x / Non Sq Epi: 0-5 /HPF / Bacteria: Present /HPF      CAPILLARY BLOOD GLUCOSE          RADIOLOGY & ADDITIONAL TESTS: Reviewed. TRANSFER NOTE  Hospital course:  79 year old male, former smoker, with history of HTN, HLD, renal artery stenosis s/p left renal stent in , CAD s/p MI with thrombectomy and multiple stents placed, who presented to [facility] ED on 10/7/19 with 5/10 substernal chest pain/pressure, non-radiating that began at approximately 4am. In ED, VS stable, EKG showed TWI in II, III  with ST depressions in V4-V6 with negative troponin.  Pain was not relieved with medical therapies and underwent cardiac cath showing LM normal, 99% mLAD, pLCx 100% with L-L collaterals, mRCA 100% with R-R collaterals.  IABP was placed 10/7 and he was admitted to CCU for further evaluation and management. Pt now stabilized for bypass surgery.    OVERNIGHT EVENTS:  No acute events overnight.    SUBJECTIVE / INTERVAL HPI: Patient seen and examined at bedside. Pt endorses some mild, intermittent chest pain, decreased from admission. Otherwise denies any complaints of SOB, abdominal pain, N/V/D.    VITAL SIGNS:  Vital Signs Last 24 Hrs  T(C): 36.6 (08 Oct 2019 11:00), Max: 37 (07 Oct 2019 20:50)  T(F): 97.8 (08 Oct 2019 11:00), Max: 98.6 (07 Oct 2019 20:50)  HR: 64 (08 Oct 2019 11:00) (58 - 70)  BP: 127/58 (08 Oct 2019 11:00) (93/54 - 154/65)  BP(mean): 93 (08 Oct 2019 11:00) (61 - 98)  RR: 16 (08 Oct 2019 11:00) (12 - 21)  SpO2: 95% (08 Oct 2019 11:00) (93% - 96%)    PHYSICAL EXAM:    General: Lying comfortably in bed, NAD  HEENT: NC/AT, PERRL, anicteric sclera; MMM  Neck: supple  Cardiovascular: Unable to appreciate heart sounds, IABP machine at bedside  Respiratory: CTA B/L, no W/R/R  Gastrointestinal: obese, soft, nontender, +BS  Extremities: WWP, no edema or cyanosis, right groin access  Vascular: 2+ radial, 1+ DP/PT pulses B/L  Neurological: AAOx3, no focal deficits    MEDICATIONS:  MEDICATIONS  (STANDING):  aspirin enteric coated 81 milliGRAM(s) Oral every 24 hours  atorvastatin 80 milliGRAM(s) Oral at bedtime  chlorhexidine 0.12% Liquid 10 milliLiter(s) Swish and Spit once  chlorhexidine 2% Cloths 1 Application(s) Topical <User Schedule>  chlorhexidine 4% Liquid 1 Application(s) Topical once  chlorhexidine 4% Liquid 1 Application(s) Topical once  heparin  Infusion 1000 Unit(s)/Hr (10 mL/Hr) IV Continuous <Continuous>  influenza   Vaccine 0.5 milliLiter(s) IntraMuscular once  metoprolol tartrate 12.5 milliGRAM(s) Oral every 12 hours    MEDICATIONS  (PRN):      ALLERGIES:  Allergies    penicillin (Unknown)    Intolerances        LABS:                        14.1   11.41 )-----------( 148      ( 08 Oct 2019 04:17 )             39.7     10    136  |  101  |  13  ----------------------------<  99  4.1   |  24  |  0.82    Ca    9.3      08 Oct 2019 04:17  Mg     1.7     10    TPro  7.2  /  Alb  4.1  /  TBili  1.0  /  DBili  x   /  AST  23  /  ALT  18  /  AlkPhos  72  10    PT/INR - ( 07 Oct 2019 22:05 )   PT: 13.6 sec;   INR: 1.20          PTT - ( 08 Oct 2019 10:42 )  PTT:27.9 sec  Urinalysis Basic - ( 07 Oct 2019 23:31 )    Color: Yellow / Appearance: Clear / S.010 / pH: x  Gluc: x / Ketone: NEGATIVE  / Bili: Negative / Urobili: 0.2 E.U./dL   Blood: x / Protein: NEGATIVE mg/dL / Nitrite: NEGATIVE   Leuk Esterase: NEGATIVE / RBC: < 5 /HPF / WBC < 5 /HPF   Sq Epi: x / Non Sq Epi: 0-5 /HPF / Bacteria: Present /HPF      CAPILLARY BLOOD GLUCOSE          RADIOLOGY & ADDITIONAL TESTS: Reviewed.

## 2019-10-08 NOTE — CONSULT NOTE ADULT - SUBJECTIVE AND OBJECTIVE BOX
CONSULT NOTE:    HPI:  80 yo male, former smoker with a PMhx of HTN, hyperlipidemia, Renal artery stenosis s/p Left renal stent placement (5/2011), known CAD s/p MI treated with thrombectomy/ROBERTA pLAD and PTCA/ROBERTA mLAD ([facility] 6/15/2010) followed by ROBERTA to prox/mid/distal LCx with tandemheart LV assist device ([facility]; 6/21/2010) admitted to [facility] for cardiac catheterization with possible intervention if clinically indicated due to patient’s risk factors, extensive CAD and unstable angina. Called intraop to assist with clark catheter placement. Patient had condom catheter in place prior to OR.      Vital Signs Last 24 Hrs  T(C): 36.8 (08 Oct 2019 13:10), Max: 37 (07 Oct 2019 20:50)  T(F): 98.3 (08 Oct 2019 13:10), Max: 98.6 (07 Oct 2019 20:50)  HR: 76 (08 Oct 2019 13:10) (58 - 76)  BP: 143/49 (08 Oct 2019 13:10) (93/54 - 154/65)  BP(mean): 76 (08 Oct 2019 12:00) (61 - 98)  RR: 19 (08 Oct 2019 13:10) (12 - 21)  SpO2: 95% (08 Oct 2019 13:10) (93% - 96%)  I&O's Summary    07 Oct 2019 07:01  -  08 Oct 2019 07:00  --------------------------------------------------------  IN: 290 mL / OUT: 1775 mL / NET: -1485 mL    08 Oct 2019 07:01  -  08 Oct 2019 15:42  --------------------------------------------------------  IN: 30 mL / OUT: 700 mL / NET: -670 mL        PE:  Gen: nad  Abd: soft, nd  : normal external genitalia      LABS:                        14.1   11.41 )-----------( 148      ( 08 Oct 2019 04:17 )             39.7     10-08    136  |  101  |  13  ----------------------------<  99  4.1   |  24  |  0.82    Ca    9.3      08 Oct 2019 04:17  Mg     1.7     10-08    TPro  7.2  /  Alb  4.1  /  TBili  1.0  /  DBili  x   /  AST  23  /  ALT  18  /  AlkPhos  72  10-07    PT/INR - ( 07 Oct 2019 22:05 )   PT: 13.6 sec;   INR: 1.20          PTT - ( 08 Oct 2019 10:42 )  PTT:27.9 sec  Cultures      A/P  80 yo m with very large prostate requiring clark catheterization for I/Os intraop  1) 18 coude placed  2) Large amount of urine drained  3) monitor for POD  4) contact urology prior to clark removal  5) discussed with gu team

## 2019-10-08 NOTE — BRIEF OPERATIVE NOTE - NSICDXBRIEFPREOP_GEN_ALL_CORE_FT
PRE-OP DIAGNOSIS:  CAD in native artery 08-Oct-2019 17:35:22  OPriscilla Borja
PRE-OP DIAGNOSIS:  CAD in native artery 08-Oct-2019 17:35:22  OPriscilla Borja

## 2019-10-08 NOTE — BRIEF OPERATIVE NOTE - NSICDXBRIEFPOSTOP_GEN_ALL_CORE_FT
POST-OP DIAGNOSIS:  CAD in native artery 08-Oct-2019 17:35:29  OPriscilla Borja
POST-OP DIAGNOSIS:  CAD in native artery 08-Oct-2019 17:35:29  OPriscilla Borja

## 2019-10-08 NOTE — PROGRESS NOTE ADULT - SUBJECTIVE AND OBJECTIVE BOX
ALDEN CHAUHAN   MRN#: [id number]     The patient is a 79y Male who was seen, evaluated, & examined with the CTICU staff post-operatively with a multidisciplinary care plan formulated & implemented.  All available clinical, laboratory, radiographic, pharmacologic, and electrocardiographic data were reviewed & analyzed.      The patient was in the CTICU in critical condition secondary to:     persistent cardiopulmonary dysfunction  hemodynamically significant anemia/hypovolemia-shock    For support and evaluation & prevention of further decompensation secondary to persistent cardiopulmonary dysfunction, respiratory status required:     supplemental oxygen with full ventilatory support / mechanical ventilation  continuous pulse oximetry monitoring  following ABGs with A-line monitoring  IV Propofol infusion    Invasive hemodynamic monitoring with     an A-line was required for continuous MAP/BP monitoring     to ensure adequate cardiovascular support and to evaluate for & help prevent decompensation while receiving     intermittent volume expansion  blood transfusions  blood product transfusions  IV Phenylephrine infusion  Intra-aortic balloon pump    secondary to     hemodynamically significant anemia/hypovolemia-shock  acute postoperative blood loss anemia    In addition:  CABG with pre-op balloon pump complicated by post-operative bleeding  Post-op requiring Phenylephrine, transfused 10 units cryo, 2 platelets, 2 FFP, 2 PRBCs with continued bleed, brought back to OR  In OR bleeding source in the mammary bed was found and cauterized, 1L evacuated from chest - also received 3 units PRBC, 2 FFP, 2L normal saline  Now in ICU on no pressors with hgb 9.2 and no output from chest tubes  Sedated on Propofol with intermittent Fentanyl  IABP on standby without any change in hemodynamics - will remove  Trend CBC, lactate q4h  Urine output is robust but is blood-tinged post traumatic insertion - will monitor  Wean to extubate once IABP is out    The patient required critical care management and I personally provided 45 minutes of non-continuous care to the patient, excluding separate procedures, in addition to  discussing the patient and plan at length with the CTICU staff and helping coordinate care.

## 2019-10-08 NOTE — PROGRESS NOTE ADULT - ASSESSMENT
79 year old male, former smoker, with history of HTN, HLD, renal artery stenosis s/p left renal stent in 2011, CAD s/p MI with thrombectomy and multiple stents placed, presenting with unstable angina vs NSTEMI, s/p cardiac cath on 10/7 with IABP placement showing severe stenosis/occlusion, now stabilized in CCU awaiting bypass surgery.    NEURO  Pt with no neurological complaints or issues of concern at this time    CARDIOVASCULAR  #CAD (coronary artery disease)  - Initial troponin negative, post-cath 0.05, now downtrended to 0.3  - pt still with 2/10 Chest pain; s/p ASA/Plavix load.  - c/w asa, lipitor, beta-blocker (lopressor)  - Holding Plavix and coumadin in setting CABG  - Cardiac cath 10/7 showing LM normal, 99% mLAD, pLCx 100% with L-L collaterals, mRCA 100% with R-R collaterals.  - Echo 10/8: EF 45-50% with some septal, apical wall hypokinesis, mild AR, trace TR  - started lopressor 12.5mg BID  - Will hold other anti-hypertensive medications and ACEI/ARBs for now  - Pt on crestor at home, c/w lipitor 80mg qd here  - CABG today    #HTN (hypertension)  - sBP mostly ranging between 100s-120s  - started lopressor 12.5mg BID  - Will hold anti-hypertensive medications and ACEI/ARBs for now    #HLD  - AM lipid panel: Cholesterol 135, triglycerides 101, HDL 71, LDL 44, cholesterol/HDL 1.9  - Pt on crestor at home, c/w lipitor 80mg qd here    PULMONARY  Pt with no pulmonary complaints or issues of concern at this time    GASTROINTESTINAL  Pt with no GI complaints or issues of concern at this time    RENAL  #Renal artery stenosis  - s/p Left renal artery stent @ [facility] 5/2011  - Has not had follow up since placement  - BUN/Cr 21/1.07; repeat 16/0.88  - Continue to monitor with daily BMPs    RHEUMATOLOGIC   #Gout  - pt on allopurinol 300mg BID at home  - not in any acute flare here  - hold allopurinol for now    INFECTIOUS DISEASE  Pt afebrile, WBC elevated at 12.08. CXR without consolidation. U/A negative.    FLUIDS/ELECTROLYTES/NUTRITION  -IVF: none, s/p NS @50cc/hr for 4 hrs  -Monitor, Replete to K>4 and Mg>2  -Diet: DASH/TLC    PROPHYLAXIS  -DVT: lovenox  -GI: none  DISPO: CCU  CODE STATUS: FULL 79 year old male, former smoker, with history of HTN, HLD, renal artery stenosis s/p left renal stent in 2011, CAD s/p MI with thrombectomy and multiple stents placed, presenting with unstable angina vs NSTEMI, s/p cardiac cath on 10/7 with IABP placement showing severe stenosis/occlusion, now stabilized in CCU awaiting bypass surgery.    NEURO  Pt with no neurological complaints or issues of concern at this time    CARDIOVASCULAR  #CAD (coronary artery disease)  - Initial troponin negative, post-cath 0.05, now downtrended to 0.3  - pt still with 2/10 Chest pain; s/p ASA/Plavix load.  - c/w asa, lipitor, beta-blocker (lopressor)  - Holding Plavix and coumadin in setting CABG  - Cardiac cath 10/7 showing LM normal, 99% mLAD, pLCx 100% with L-L collaterals, mRCA 100% with R-R collaterals.  - Echo 10/8: EF 45-50% with some septal, apical wall hypokinesis, mild AR, trace TR  - started lopressor 12.5mg BID  - Will hold other anti-hypertensive medications and ACEI/ARBs for now  - Pt on crestor at home, c/w lipitor 80mg qd here  - CABG today    #HTN (hypertension)  - sBP mostly ranging between 100s-120s  - started lopressor 12.5mg BID  - Will hold anti-hypertensive medications and ACEI/ARBs for now    #HLD  - AM lipid panel: Cholesterol 135, triglycerides 101, HDL 71, LDL 44, cholesterol/HDL 1.9  - Pt on crestor at home, c/w lipitor 80mg qd here    PULMONARY  Pt with no pulmonary complaints or issues of concern at this time    GASTROINTESTINAL  Pt with no GI complaints or issues of concern at this time    RENAL  #Renal artery stenosis  - s/p Left renal artery stent @ [facility] 5/2011  - Has not had follow up since placement  - BUN/Cr 21/1.07; repeat 16/0.88  - Continue to monitor with daily BMPs    RHEUMATOLOGIC   #Gout  - pt on allopurinol 300mg BID at home  - not in any acute flare here  - hold allopurinol for now    INFECTIOUS DISEASE  Pt afebrile, WBC elevated at 11.41, likely reactive after cardiac cath. CXR without consolidation. U/A negative.    FLUIDS/ELECTROLYTES/NUTRITION  -IVF: None  -Monitor, Replete to K>4 and Mg>2  -Diet: NPO for pre-op    PROPHYLAXIS  DVT PPx: None  GI PPx: none  CODE STATUS: FULL  DISPO: Transfer to CT surgery

## 2019-10-08 NOTE — PROGRESS NOTE ADULT - ASSESSMENT
This is a 79 year old male, former smoker (quit 50 years ago), with history of HTN, HLD, renal artery stenosis s/p left renal stent in 2011, CAD s/p MI with thrombectomy and PES (ROBERTA to pLAD and PTCA/ROBERTA to mLAD in 6/2010 with ROBERTA to LCx with tandemheart/LVAD a week later on 6/21/2010) who presented to [facility] ED on 10/7/19 with substernal chest pain/pressure, 5/10, non-radiating that began that morning at approximately 4am that morning without relief.  He reports that over the last month he has had intermittent "indigestion" with spontaneous onset as well as claudication with diminished exercise tolerance for which he was recently evaluated by his PMD, Dr. Patiño, where he had an "abnormal ECHO" and was encouraged to follow-up with cardiology.  In ED, VS stable, EKG showed TWI in II, III  with ST depressions in V4-V6 with negative troponin.  Pain was not relieved with medical therapies and underwent cardiac cath showing LM normal, 99% mLAD, pLCx 100% with L-L collaterals, mRCA 100% with R-R collaterals.  IABP was placed and he was admitted to CCU for further evaluation and management.  Dr. Small initially consulted, however patient will now go for urgent surgery today with Dr. Anaya. Troponins now positive, ruled in for NSTEMI. This is a 79 year old male, former smoker (quit 50 years ago), with history of HTN, HLD, renal artery stenosis s/p left renal stent in 2011, CAD s/p MI with thrombectomy and PES (ROBERTA to pLAD and PTCA/ROBERTA to mLAD in 6/2010 with ROBERTA to LCx with tandemheart/LVAD a week later on 6/21/2010) who presented to [facility] ED on 10/7/19 with substernal chest pain/pressure, 5/10, non-radiating that began that morning at approximately 4am that morning without relief.  He reports that over the last month he has had intermittent "indigestion" with spontaneous onset as well as claudication with diminished exercise tolerance for which he was recently evaluated by his PMD, Dr. Patiño, where he had an "abnormal ECHO" and was encouraged to follow-up with cardiology.  In ED, VS stable, EKG showed TWI in II, III  with ST depressions in V4-V6 with negative troponin.  Pain was not relieved with medical therapies and underwent cardiac cath showing LM normal, 99% mLAD, pLCx 100% with L-L collaterals, mRCA 100% with R-R collaterals.  IABP was placed and he was admitted to CCU for further evaluation and management.  Dr. Small initially consulted, however patient will now go for urgent surgery today with Dr. Anaya. Troponins now positive, ruled in for NSTEMI.      Plan  Problem 1. CAD.  Urgent OPCAB later today w/ Dr. Anaya, about 1pm start.  TTE being done now, F/U results.  Will try for bedside carotids if possible. This is a 79 year old male, former smoker (quit 50 years ago), with history of HTN, HLD, renal artery stenosis s/p left renal stent in 2011, CAD s/p MI with thrombectomy and PES (ROBERTA to pLAD and PTCA/ROBERTA to mLAD in 6/2010 with ROBERTA to LCx with tandemheart/LVAD a week later on 6/21/2010) who presented to [facility] ED on 10/7/19 with substernal chest pain/pressure, 5/10, non-radiating that began that morning at approximately 4am that morning without relief.  He reports that over the last month he has had intermittent "indigestion" with spontaneous onset as well as claudication with diminished exercise tolerance for which he was recently evaluated by his PMD, Dr. Patiño, where he had an "abnormal ECHO" and was encouraged to follow-up with cardiology.  In ED, VS stable, EKG showed TWI in II, III  with ST depressions in V4-V6 with negative troponin.  Pain was not relieved with medical therapies and underwent cardiac cath showing LM normal, 99% mLAD, pLCx 100% with L-L collaterals, mRCA 100% with R-R collaterals.  IABP was placed and he was admitted to CCU for further evaluation and management.  Dr. Small initially consulted, however patient will now go for urgent surgery today with Dr. Anaya. Troponins now positive, ruled in for NSTEMI.      Plan  Problem 1. CAD.  Urgent OPCAB later today w/ Dr. Anaya, about 1pm start.  TTE being done now, F/U results.  Will try for bedside carotids if possible.  2nd T&S sent, blood ordered.  NPO since 9am (few sips of coffee).  Continue w/ IABP.  Lovenox stopped, Heparin gtt started.  +doppler pulses.      Problem 2.  HTN.  On BB.      Problem 3.  HLD.  Statin.

## 2019-10-08 NOTE — PROGRESS NOTE ADULT - SUBJECTIVE AND OBJECTIVE BOX
CTICU  CRITICAL  CARE  attending     Hand off received 					   Pertinent clinical, laboratory, radiographic, hemodynamic, echocardiographic, respiratory data, microbiologic data and chart were reviewed and analyzed frequently throughout the course of the day and night  Patient seen and examined with CTS/ SH attending at bedside  Pt is a 79y , Male, HEALTH ISSUES - PROBLEM Dx:  HLD (hyperlipidemia): HLD (hyperlipidemia)  HTN (hypertension): HTN (hypertension)  Renal artery stenosis: Renal artery stenosis  CAD (coronary artery disease): CAD (coronary artery disease)      , FAMILY HISTORY:  PAST MEDICAL & SURGICAL HISTORY:  Gout  HLD (hyperlipidemia)  HTN (hypertension)  Renal artery stenosis  CAD (coronary artery disease)  H/O renal artery stenosis: s/p stent  S/P coronary artery stent placement    Patient is a 79y old  Male who presents with a chief complaint of CABG (08 Oct 2019 23:18)      14 system review was unremarkable    Vital signs, hemodynamic and respiratory parameters were reviewed from the bedside nursing flowsheet.  ICU Vital Signs Last 24 Hrs  T(C): 36.2 (09 Oct 2019 05:01), Max: 36.8 (08 Oct 2019 13:00)  T(F): 97.2 (09 Oct 2019 05:01), Max: 98.3 (08 Oct 2019 13:00)  HR: 72 (09 Oct 2019 08:00) (56 - 78)  BP: 143/49 (08 Oct 2019 13:10) (127/58 - 154/65)  BP(mean): 76 (08 Oct 2019 12:00) (76 - 97)  ABP: 116/38 (09 Oct 2019 08:00) (74/34 - 168/54)  ABP(mean): 62 (09 Oct 2019 08:00) (52 - 106)  RR: 17 (09 Oct 2019 08:00) (3 - 20)  SpO2: 96% (09 Oct 2019 08:00) (93% - 100%)    Adult Advanced Hemodynamics Last 24 Hrs  CVP(mm Hg): 5 (09 Oct 2019 08:00) (3 - 20)  CVP(cm H2O): --  CO: --  CI: --  PA: --  PA(mean): --  PCWP: --  SVR: --  SVRI: --  PVR: --  PVRI: --, ABG - ( 09 Oct 2019 07:48 )  pH, Arterial: 7.43  pH, Blood: x     /  pCO2: 33    /  pO2: 78    / HCO3: 22    / Base Excess: -2.1  /  SaO2: 95                Mode: CPAP with PS  FiO2: 50  PEEP: 8  PS: 10  MAP: 11  PIP: 10    Intake and output was reviewed and the fluid balance was calculated  Daily Height in cm: 171.4 (08 Oct 2019 13:10)    Daily   I&O's Summary    08 Oct 2019 07:01  -  09 Oct 2019 07:00  --------------------------------------------------------  IN: 4994.6 mL / OUT: 2805 mL / NET: 2189.6 mL    09 Oct 2019 07:01  -  09 Oct 2019 08:23  --------------------------------------------------------  IN: 30 mL / OUT: 50 mL / NET: -20 mL        All lines and drain sites were assessed  Glycemic trend was reviewedCAPILLARY BLOOD GLUCOSE      POCT Blood Glucose.: 103 mg/dL (09 Oct 2019 05:07)    No acute change in mental status  Auscultation of the chest reveals equal bs  Abdomen is soft  Extremities are warm and well perfused  Wounds appear clean and unremarkable  Antibiotics are periop    labs  CBC Full  -  ( 09 Oct 2019 03:28 )  WBC Count : 14.21 K/uL  RBC Count : 3.18 M/uL  Hemoglobin : 9.8 g/dL  Hematocrit : 28.7 %  Platelet Count - Automated : 161 K/uL  Mean Cell Volume : 90.3 fl  Mean Cell Hemoglobin : 30.8 pg  Mean Cell Hemoglobin Concentration : 34.1 gm/dL  Auto Neutrophil # : x  Auto Lymphocyte # : x  Auto Monocyte # : x  Auto Eosinophil # : x  Auto Basophil # : x  Auto Neutrophil % : x  Auto Lymphocyte % : x  Auto Monocyte % : x  Auto Eosinophil % : x  Auto Basophil % : x    10-09    134<L>  |  101  |  14  ----------------------------<  130<H>  4.2   |  22  |  0.78    Ca    8.6      09 Oct 2019 03:28  Phos  3.2     10-09  Mg     1.9     10-09    TPro  5.3<L>  /  Alb  3.5  /  TBili  2.4<H>  /  DBili  x   /  AST  20  /  ALT  10  /  AlkPhos  39<L>  10-09    PT/INR - ( 09 Oct 2019 03:28 )   PT: 15.2 sec;   INR: 1.33          PTT - ( 09 Oct 2019 03:28 )  PTT:26.1 sec  The current medications were reviewed   MEDICATIONS  (STANDING):  albumin human  5% IVPB 250 milliLiter(s) IV Intermittent every 10 minutes  aspirin enteric coated 81 milliGRAM(s) Oral daily  atorvastatin 80 milliGRAM(s) Oral at bedtime  ceFAZolin  Injectable. 2000 milliGRAM(s) IV Push every 8 hours  chlorhexidine 0.12% Liquid 5 milliLiter(s) Oral Mucosa every 4 hours  chlorhexidine 0.12% Liquid 15 milliLiter(s) Oral Mucosa every 12 hours  chlorhexidine 2% Cloths 1 Application(s) Topical daily  clopidogrel Tablet 75 milliGRAM(s) Oral daily  dexmedetomidine Infusion 0.2 MICROgram(s)/kG/Hr (4.205 mL/Hr) IV Continuous <Continuous>  dextrose 50% Injectable 50 milliLiter(s) IV Push every 15 minutes  dextrose 50% Injectable 25 milliLiter(s) IV Push every 15 minutes  heparin  Injectable 5000 Unit(s) SubCutaneous every 8 hours  insulin regular Infusion 1 Unit(s)/Hr (1 mL/Hr) IV Continuous <Continuous>  pantoprazole  Injectable 40 milliGRAM(s) IV Push daily  phenylephrine    Infusion 0.1 MICROgram(s)/kG/Min (3.154 mL/Hr) IV Continuous <Continuous>  propofol Infusion 10 MICROgram(s)/kG/Min (5.046 mL/Hr) IV Continuous <Continuous>  sodium chloride 0.9%. 1000 milliLiter(s) (10 mL/Hr) IV Continuous <Continuous>    MEDICATIONS  (PRN):  fentaNYL    Injectable 25 MICROGram(s) IV Push every 3 hours PRN Severe Pain (7 - 10)       PROBLEM LIST/ ASSESSMENT:  HEALTH ISSUES - PROBLEM Dx:  HLD (hyperlipidemia): HLD (hyperlipidemia)  HTN (hypertension): HTN (hypertension)  Renal artery stenosis: Renal artery stenosis  CAD (coronary artery disease): CAD (coronary artery disease)      ,   Patient is a 79y old  Male who presents with a chief complaint of CABG (08 Oct 2019 23:18)     s/p cardiac surgery              My plan includes :  close hemodynamic, ventilatory and drain monitoring and management per post op routine    Monitor for arrhythmias and monitor parameters for organ perfusion  beta blockade not administered due to hemodynamic instability and bradycardia  monitor neurologic status  Head of the bed should remain elevated to 45 deg .   chest PT and IS will be encouraged  monitor adequacy of oxygenation and ventilation and attempt to wean oxygen  antibiotic regimen will be tailored to the clinical, laboratory and microbiologic data  Nutritional goals will be met using po eventually , ensure adequate caloric intake and montior the same  Stress ulcer and VTE prophylaxis will be achieved    Glycemic control is satisfactory  Electrolytes have been repleted as necessary and wound care has been carried out. Pain control has been achieved.   agressive physical therapy and early mobility and ambulation goals will be met   The family was updated about the course and plan  CRITICAL CARE TIME personally provided by me  in evaluation and management, reassessments, review and interpretation of labs and x-rays, ventilator and hemodynamic management, formulating a plan and coordinating care: ___90____ MIN.  Time does not include procedural time.  CTICU ATTENDING     					    Lm Dunne MD

## 2019-10-08 NOTE — BRIEF OPERATIVE NOTE - NSICDXBRIEFPROCEDURE_GEN_ALL_CORE_FT
PROCEDURES:  Exploration, chest 08-Oct-2019 22:06:39  Priscilla Andrea  CABG, with HIRA 08-Oct-2019 17:35:37 LIMA-LAD, SVG-OM Priscilla Andrea
PROCEDURES:  CABG, with HIRA 08-Oct-2019 17:35:37 LIMA-YUMIKO, SVG-OM Priscilla Andrea

## 2019-10-09 LAB
ALBUMIN SERPL ELPH-MCNC: 3.5 G/DL — SIGNIFICANT CHANGE UP (ref 3.3–5)
ALBUMIN SERPL ELPH-MCNC: 3.8 G/DL — SIGNIFICANT CHANGE UP (ref 3.3–5)
ALP SERPL-CCNC: 37 U/L — LOW (ref 40–120)
ALP SERPL-CCNC: 39 U/L — LOW (ref 40–120)
ALP SERPL-CCNC: 45 U/L — SIGNIFICANT CHANGE UP (ref 40–120)
ALP SERPL-CCNC: 45 U/L — SIGNIFICANT CHANGE UP (ref 40–120)
ALT FLD-CCNC: 10 U/L — SIGNIFICANT CHANGE UP (ref 10–45)
ALT FLD-CCNC: 10 U/L — SIGNIFICANT CHANGE UP (ref 10–45)
ALT FLD-CCNC: 11 U/L — SIGNIFICANT CHANGE UP (ref 10–45)
ALT FLD-CCNC: 9 U/L — LOW (ref 10–45)
ANION GAP SERPL CALC-SCNC: 11 MMOL/L — SIGNIFICANT CHANGE UP (ref 5–17)
ANION GAP SERPL CALC-SCNC: 13 MMOL/L — SIGNIFICANT CHANGE UP (ref 5–17)
APTT BLD: 26.1 SEC — LOW (ref 27.5–36.3)
APTT BLD: 28.3 SEC — SIGNIFICANT CHANGE UP (ref 27.5–36.3)
APTT BLD: 29.3 SEC — SIGNIFICANT CHANGE UP (ref 27.5–36.3)
APTT BLD: 30.4 SEC — SIGNIFICANT CHANGE UP (ref 27.5–36.3)
AST SERPL-CCNC: 20 U/L — SIGNIFICANT CHANGE UP (ref 10–40)
AST SERPL-CCNC: 29 U/L — SIGNIFICANT CHANGE UP (ref 10–40)
AST SERPL-CCNC: 44 U/L — HIGH (ref 10–40)
AST SERPL-CCNC: 48 U/L — HIGH (ref 10–40)
BILIRUB SERPL-MCNC: 1.1 MG/DL — SIGNIFICANT CHANGE UP (ref 0.2–1.2)
BILIRUB SERPL-MCNC: 1.2 MG/DL — SIGNIFICANT CHANGE UP (ref 0.2–1.2)
BILIRUB SERPL-MCNC: 1.7 MG/DL — HIGH (ref 0.2–1.2)
BILIRUB SERPL-MCNC: 2.4 MG/DL — HIGH (ref 0.2–1.2)
BUN SERPL-MCNC: 14 MG/DL — SIGNIFICANT CHANGE UP (ref 7–23)
BUN SERPL-MCNC: 14 MG/DL — SIGNIFICANT CHANGE UP (ref 7–23)
BUN SERPL-MCNC: 15 MG/DL — SIGNIFICANT CHANGE UP (ref 7–23)
BUN SERPL-MCNC: 16 MG/DL — SIGNIFICANT CHANGE UP (ref 7–23)
CALCIUM SERPL-MCNC: 8.4 MG/DL — SIGNIFICANT CHANGE UP (ref 8.4–10.5)
CALCIUM SERPL-MCNC: 8.5 MG/DL — SIGNIFICANT CHANGE UP (ref 8.4–10.5)
CALCIUM SERPL-MCNC: 8.6 MG/DL — SIGNIFICANT CHANGE UP (ref 8.4–10.5)
CALCIUM SERPL-MCNC: 8.6 MG/DL — SIGNIFICANT CHANGE UP (ref 8.4–10.5)
CHLORIDE SERPL-SCNC: 100 MMOL/L — SIGNIFICANT CHANGE UP (ref 96–108)
CHLORIDE SERPL-SCNC: 101 MMOL/L — SIGNIFICANT CHANGE UP (ref 96–108)
CHLORIDE SERPL-SCNC: 103 MMOL/L — SIGNIFICANT CHANGE UP (ref 96–108)
CHLORIDE SERPL-SCNC: 98 MMOL/L — SIGNIFICANT CHANGE UP (ref 96–108)
CK SERPL-CCNC: 1260 U/L — HIGH (ref 30–200)
CO2 SERPL-SCNC: 22 MMOL/L — SIGNIFICANT CHANGE UP (ref 22–31)
CO2 SERPL-SCNC: 23 MMOL/L — SIGNIFICANT CHANGE UP (ref 22–31)
CO2 SERPL-SCNC: 24 MMOL/L — SIGNIFICANT CHANGE UP (ref 22–31)
CO2 SERPL-SCNC: 24 MMOL/L — SIGNIFICANT CHANGE UP (ref 22–31)
CREAT SERPL-MCNC: 0.78 MG/DL — SIGNIFICANT CHANGE UP (ref 0.5–1.3)
CREAT SERPL-MCNC: 0.82 MG/DL — SIGNIFICANT CHANGE UP (ref 0.5–1.3)
CREAT SERPL-MCNC: 0.85 MG/DL — SIGNIFICANT CHANGE UP (ref 0.5–1.3)
CREAT SERPL-MCNC: 0.95 MG/DL — SIGNIFICANT CHANGE UP (ref 0.5–1.3)
GAS PNL BLDA: SIGNIFICANT CHANGE UP
GLUCOSE BLDC GLUCOMTR-MCNC: 103 MG/DL — HIGH (ref 70–99)
GLUCOSE BLDC GLUCOMTR-MCNC: 126 MG/DL — HIGH (ref 70–99)
GLUCOSE BLDC GLUCOMTR-MCNC: 130 MG/DL — HIGH (ref 70–99)
GLUCOSE BLDC GLUCOMTR-MCNC: 131 MG/DL — HIGH (ref 70–99)
GLUCOSE BLDC GLUCOMTR-MCNC: 141 MG/DL — HIGH (ref 70–99)
GLUCOSE BLDC GLUCOMTR-MCNC: 145 MG/DL — HIGH (ref 70–99)
GLUCOSE BLDC GLUCOMTR-MCNC: 147 MG/DL — HIGH (ref 70–99)
GLUCOSE BLDC GLUCOMTR-MCNC: 174 MG/DL — HIGH (ref 70–99)
GLUCOSE SERPL-MCNC: 130 MG/DL — HIGH (ref 70–99)
GLUCOSE SERPL-MCNC: 137 MG/DL — HIGH (ref 70–99)
GLUCOSE SERPL-MCNC: 140 MG/DL — HIGH (ref 70–99)
GLUCOSE SERPL-MCNC: 140 MG/DL — HIGH (ref 70–99)
GRAM STN FLD: SIGNIFICANT CHANGE UP
HCT VFR BLD CALC: 25.7 % — LOW (ref 39–50)
HCT VFR BLD CALC: 26.2 % — LOW (ref 39–50)
HCT VFR BLD CALC: 26.8 % — LOW (ref 39–50)
HCT VFR BLD CALC: 28.7 % — LOW (ref 39–50)
HGB BLD-MCNC: 9 G/DL — LOW (ref 13–17)
HGB BLD-MCNC: 9.1 G/DL — LOW (ref 13–17)
HGB BLD-MCNC: 9.4 G/DL — LOW (ref 13–17)
HGB BLD-MCNC: 9.8 G/DL — LOW (ref 13–17)
INR BLD: 1.33 — HIGH (ref 0.88–1.16)
INR BLD: 1.36 — HIGH (ref 0.88–1.16)
INR BLD: 1.6 — HIGH (ref 0.88–1.16)
LACTATE SERPL-SCNC: 1.5 MMOL/L — SIGNIFICANT CHANGE UP (ref 0.5–2)
LACTATE SERPL-SCNC: 1.6 MMOL/L — SIGNIFICANT CHANGE UP (ref 0.5–2)
LACTATE SERPL-SCNC: 1.7 MMOL/L — SIGNIFICANT CHANGE UP (ref 0.5–2)
LACTATE SERPL-SCNC: 3 MMOL/L — HIGH (ref 0.5–2)
LACTATE SERPL-SCNC: 3.7 MMOL/L — HIGH (ref 0.5–2)
MAGNESIUM SERPL-MCNC: 1.8 MG/DL — SIGNIFICANT CHANGE UP (ref 1.6–2.6)
MAGNESIUM SERPL-MCNC: 1.9 MG/DL — SIGNIFICANT CHANGE UP (ref 1.6–2.6)
MAGNESIUM SERPL-MCNC: 2.1 MG/DL — SIGNIFICANT CHANGE UP (ref 1.6–2.6)
MAGNESIUM SERPL-MCNC: 2.3 MG/DL — SIGNIFICANT CHANGE UP (ref 1.6–2.6)
MCHC RBC-ENTMCNC: 30.8 PG — SIGNIFICANT CHANGE UP (ref 27–34)
MCHC RBC-ENTMCNC: 30.9 PG — SIGNIFICANT CHANGE UP (ref 27–34)
MCHC RBC-ENTMCNC: 31.2 PG — SIGNIFICANT CHANGE UP (ref 27–34)
MCHC RBC-ENTMCNC: 31.4 PG — SIGNIFICANT CHANGE UP (ref 27–34)
MCHC RBC-ENTMCNC: 34.1 GM/DL — SIGNIFICANT CHANGE UP (ref 32–36)
MCHC RBC-ENTMCNC: 34.4 GM/DL — SIGNIFICANT CHANGE UP (ref 32–36)
MCHC RBC-ENTMCNC: 35.1 GM/DL — SIGNIFICANT CHANGE UP (ref 32–36)
MCHC RBC-ENTMCNC: 35.4 GM/DL — SIGNIFICANT CHANGE UP (ref 32–36)
MCV RBC AUTO: 88 FL — SIGNIFICANT CHANGE UP (ref 80–100)
MCV RBC AUTO: 89.6 FL — SIGNIFICANT CHANGE UP (ref 80–100)
MCV RBC AUTO: 90 FL — SIGNIFICANT CHANGE UP (ref 80–100)
MCV RBC AUTO: 90.3 FL — SIGNIFICANT CHANGE UP (ref 80–100)
NRBC # BLD: 0 /100 WBCS — SIGNIFICANT CHANGE UP (ref 0–0)
PHOSPHATE SERPL-MCNC: 3.2 MG/DL — SIGNIFICANT CHANGE UP (ref 2.5–4.5)
PHOSPHATE SERPL-MCNC: 3.2 MG/DL — SIGNIFICANT CHANGE UP (ref 2.5–4.5)
PHOSPHATE SERPL-MCNC: 3.5 MG/DL — SIGNIFICANT CHANGE UP (ref 2.5–4.5)
PLATELET # BLD AUTO: 124 K/UL — LOW (ref 150–400)
PLATELET # BLD AUTO: 134 K/UL — LOW (ref 150–400)
PLATELET # BLD AUTO: 136 K/UL — LOW (ref 150–400)
PLATELET # BLD AUTO: 161 K/UL — SIGNIFICANT CHANGE UP (ref 150–400)
POTASSIUM SERPL-MCNC: 3.8 MMOL/L — SIGNIFICANT CHANGE UP (ref 3.5–5.3)
POTASSIUM SERPL-MCNC: 4.2 MMOL/L — SIGNIFICANT CHANGE UP (ref 3.5–5.3)
POTASSIUM SERPL-MCNC: 4.2 MMOL/L — SIGNIFICANT CHANGE UP (ref 3.5–5.3)
POTASSIUM SERPL-MCNC: 4.7 MMOL/L — SIGNIFICANT CHANGE UP (ref 3.5–5.3)
POTASSIUM SERPL-SCNC: 3.8 MMOL/L — SIGNIFICANT CHANGE UP (ref 3.5–5.3)
POTASSIUM SERPL-SCNC: 4.2 MMOL/L — SIGNIFICANT CHANGE UP (ref 3.5–5.3)
POTASSIUM SERPL-SCNC: 4.2 MMOL/L — SIGNIFICANT CHANGE UP (ref 3.5–5.3)
POTASSIUM SERPL-SCNC: 4.7 MMOL/L — SIGNIFICANT CHANGE UP (ref 3.5–5.3)
PROT SERPL-MCNC: 5.3 G/DL — LOW (ref 6–8.3)
PROT SERPL-MCNC: 5.5 G/DL — LOW (ref 6–8.3)
PROT SERPL-MCNC: 6 G/DL — SIGNIFICANT CHANGE UP (ref 6–8.3)
PROT SERPL-MCNC: 6 G/DL — SIGNIFICANT CHANGE UP (ref 6–8.3)
PROTHROM AB SERPL-ACNC: 15.2 SEC — HIGH (ref 10–12.9)
PROTHROM AB SERPL-ACNC: 15.5 SEC — HIGH (ref 10–12.9)
PROTHROM AB SERPL-ACNC: 18.3 SEC — HIGH (ref 10–12.9)
RBC # BLD: 2.91 M/UL — LOW (ref 4.2–5.8)
RBC # BLD: 2.92 M/UL — LOW (ref 4.2–5.8)
RBC # BLD: 2.99 M/UL — LOW (ref 4.2–5.8)
RBC # BLD: 3.18 M/UL — LOW (ref 4.2–5.8)
RBC # FLD: 15.2 % — HIGH (ref 10.3–14.5)
RBC # FLD: 15.8 % — HIGH (ref 10.3–14.5)
RBC # FLD: 15.8 % — HIGH (ref 10.3–14.5)
RBC # FLD: 15.9 % — HIGH (ref 10.3–14.5)
SODIUM SERPL-SCNC: 134 MMOL/L — LOW (ref 135–145)
SODIUM SERPL-SCNC: 135 MMOL/L — SIGNIFICANT CHANGE UP (ref 135–145)
SODIUM SERPL-SCNC: 135 MMOL/L — SIGNIFICANT CHANGE UP (ref 135–145)
SODIUM SERPL-SCNC: 137 MMOL/L — SIGNIFICANT CHANGE UP (ref 135–145)
WBC # BLD: 12.65 K/UL — HIGH (ref 3.8–10.5)
WBC # BLD: 14.21 K/UL — HIGH (ref 3.8–10.5)
WBC # BLD: 16.32 K/UL — HIGH (ref 3.8–10.5)
WBC # BLD: 17.22 K/UL — HIGH (ref 3.8–10.5)
WBC # FLD AUTO: 12.65 K/UL — HIGH (ref 3.8–10.5)
WBC # FLD AUTO: 14.21 K/UL — HIGH (ref 3.8–10.5)
WBC # FLD AUTO: 16.32 K/UL — HIGH (ref 3.8–10.5)
WBC # FLD AUTO: 17.22 K/UL — HIGH (ref 3.8–10.5)

## 2019-10-09 PROCEDURE — 99232 SBSQ HOSP IP/OBS MODERATE 35: CPT

## 2019-10-09 PROCEDURE — 99291 CRITICAL CARE FIRST HOUR: CPT

## 2019-10-09 PROCEDURE — 71045 X-RAY EXAM CHEST 1 VIEW: CPT | Mod: 26

## 2019-10-09 PROCEDURE — 99292 CRITICAL CARE ADDL 30 MIN: CPT

## 2019-10-09 RX ORDER — TAMSULOSIN HYDROCHLORIDE 0.4 MG/1
0.4 CAPSULE ORAL ONCE
Refills: 0 | Status: DISCONTINUED | OUTPATIENT
Start: 2019-10-09 | End: 2019-10-09

## 2019-10-09 RX ORDER — FENTANYL CITRATE 50 UG/ML
12.5 INJECTION INTRAVENOUS ONCE
Refills: 0 | Status: DISCONTINUED | OUTPATIENT
Start: 2019-10-09 | End: 2019-10-09

## 2019-10-09 RX ORDER — PANTOPRAZOLE SODIUM 20 MG/1
40 TABLET, DELAYED RELEASE ORAL
Refills: 0 | Status: DISCONTINUED | OUTPATIENT
Start: 2019-10-09 | End: 2019-10-17

## 2019-10-09 RX ORDER — TAMSULOSIN HYDROCHLORIDE 0.4 MG/1
0.4 CAPSULE ORAL AT BEDTIME
Refills: 0 | Status: DISCONTINUED | OUTPATIENT
Start: 2019-10-09 | End: 2019-10-16

## 2019-10-09 RX ORDER — SENNA PLUS 8.6 MG/1
1 TABLET ORAL AT BEDTIME
Refills: 0 | Status: DISCONTINUED | OUTPATIENT
Start: 2019-10-09 | End: 2019-10-17

## 2019-10-09 RX ORDER — ALBUMIN HUMAN 25 %
250 VIAL (ML) INTRAVENOUS ONCE
Refills: 0 | Status: COMPLETED | OUTPATIENT
Start: 2019-10-09 | End: 2019-10-09

## 2019-10-09 RX ORDER — POTASSIUM CHLORIDE 20 MEQ
20 PACKET (EA) ORAL ONCE
Refills: 0 | Status: COMPLETED | OUTPATIENT
Start: 2019-10-09 | End: 2019-10-09

## 2019-10-09 RX ORDER — ACETAMINOPHEN 500 MG
650 TABLET ORAL EVERY 6 HOURS
Refills: 0 | Status: DISCONTINUED | OUTPATIENT
Start: 2019-10-09 | End: 2019-10-14

## 2019-10-09 RX ORDER — FUROSEMIDE 40 MG
40 TABLET ORAL ONCE
Refills: 0 | Status: COMPLETED | OUTPATIENT
Start: 2019-10-09 | End: 2019-10-09

## 2019-10-09 RX ORDER — MAGNESIUM SULFATE 500 MG/ML
2 VIAL (ML) INJECTION ONCE
Refills: 0 | Status: COMPLETED | OUTPATIENT
Start: 2019-10-09 | End: 2019-10-09

## 2019-10-09 RX ORDER — DOCUSATE SODIUM 100 MG
100 CAPSULE ORAL THREE TIMES A DAY
Refills: 0 | Status: DISCONTINUED | OUTPATIENT
Start: 2019-10-09 | End: 2019-10-17

## 2019-10-09 RX ORDER — OXYCODONE AND ACETAMINOPHEN 5; 325 MG/1; MG/1
2 TABLET ORAL EVERY 6 HOURS
Refills: 0 | Status: DISCONTINUED | OUTPATIENT
Start: 2019-10-09 | End: 2019-10-14

## 2019-10-09 RX ORDER — IPRATROPIUM BROMIDE 0.2 MG/ML
500 SOLUTION, NON-ORAL INHALATION ONCE
Refills: 0 | Status: COMPLETED | OUTPATIENT
Start: 2019-10-09 | End: 2019-10-09

## 2019-10-09 RX ORDER — MORPHINE SULFATE 50 MG/1
1 CAPSULE, EXTENDED RELEASE ORAL ONCE
Refills: 0 | Status: DISCONTINUED | OUTPATIENT
Start: 2019-10-09 | End: 2019-10-09

## 2019-10-09 RX ORDER — OXYCODONE AND ACETAMINOPHEN 5; 325 MG/1; MG/1
1 TABLET ORAL EVERY 6 HOURS
Refills: 0 | Status: DISCONTINUED | OUTPATIENT
Start: 2019-10-09 | End: 2019-10-14

## 2019-10-09 RX ADMIN — Medication 1000 MILLIGRAM(S): at 06:28

## 2019-10-09 RX ADMIN — Medication 400 MILLIGRAM(S): at 05:34

## 2019-10-09 RX ADMIN — FENTANYL CITRATE 25 MICROGRAM(S): 50 INJECTION INTRAVENOUS at 03:29

## 2019-10-09 RX ADMIN — Medication 50 GRAM(S): at 17:39

## 2019-10-09 RX ADMIN — FENTANYL CITRATE 25 MICROGRAM(S): 50 INJECTION INTRAVENOUS at 02:27

## 2019-10-09 RX ADMIN — Medication 40 MILLIGRAM(S): at 13:35

## 2019-10-09 RX ADMIN — OXYCODONE AND ACETAMINOPHEN 2 TABLET(S): 5; 325 TABLET ORAL at 11:34

## 2019-10-09 RX ADMIN — CHLORHEXIDINE GLUCONATE 1 APPLICATION(S): 213 SOLUTION TOPICAL at 09:30

## 2019-10-09 RX ADMIN — FENTANYL CITRATE 25 MICROGRAM(S): 50 INJECTION INTRAVENOUS at 20:00

## 2019-10-09 RX ADMIN — CHLORHEXIDINE GLUCONATE 15 MILLILITER(S): 213 SOLUTION TOPICAL at 17:39

## 2019-10-09 RX ADMIN — Medication 500 MICROGRAM(S): at 19:01

## 2019-10-09 RX ADMIN — CHLORHEXIDINE GLUCONATE 5 MILLILITER(S): 213 SOLUTION TOPICAL at 03:30

## 2019-10-09 RX ADMIN — HEPARIN SODIUM 5000 UNIT(S): 5000 INJECTION INTRAVENOUS; SUBCUTANEOUS at 21:30

## 2019-10-09 RX ADMIN — FENTANYL CITRATE 25 MICROGRAM(S): 50 INJECTION INTRAVENOUS at 07:40

## 2019-10-09 RX ADMIN — Medication 125 MILLILITER(S): at 04:34

## 2019-10-09 RX ADMIN — MORPHINE SULFATE 1 MILLIGRAM(S): 50 CAPSULE, EXTENDED RELEASE ORAL at 13:35

## 2019-10-09 RX ADMIN — Medication 125 MILLILITER(S): at 04:42

## 2019-10-09 RX ADMIN — Medication 100 MILLIEQUIVALENT(S): at 18:35

## 2019-10-09 RX ADMIN — MORPHINE SULFATE 1 MILLIGRAM(S): 50 CAPSULE, EXTENDED RELEASE ORAL at 12:10

## 2019-10-09 RX ADMIN — MORPHINE SULFATE 1 MILLIGRAM(S): 50 CAPSULE, EXTENDED RELEASE ORAL at 11:51

## 2019-10-09 RX ADMIN — SENNA PLUS 1 TABLET(S): 8.6 TABLET ORAL at 21:30

## 2019-10-09 RX ADMIN — Medication 100 MILLIGRAM(S): at 21:28

## 2019-10-09 RX ADMIN — Medication 100 MILLIGRAM(S): at 13:21

## 2019-10-09 RX ADMIN — Medication 81 MILLIGRAM(S): at 11:20

## 2019-10-09 RX ADMIN — CHLORHEXIDINE GLUCONATE 5 MILLILITER(S): 213 SOLUTION TOPICAL at 11:20

## 2019-10-09 RX ADMIN — PANTOPRAZOLE SODIUM 40 MILLIGRAM(S): 20 TABLET, DELAYED RELEASE ORAL at 11:20

## 2019-10-09 RX ADMIN — FENTANYL CITRATE 25 MICROGRAM(S): 50 INJECTION INTRAVENOUS at 04:32

## 2019-10-09 RX ADMIN — Medication 50 GRAM(S): at 04:56

## 2019-10-09 RX ADMIN — Medication 2000 MILLIGRAM(S): at 13:20

## 2019-10-09 RX ADMIN — Medication 200 GRAM(S): at 00:29

## 2019-10-09 RX ADMIN — Medication 20 MILLIEQUIVALENT(S): at 19:00

## 2019-10-09 RX ADMIN — CHLORHEXIDINE GLUCONATE 5 MILLILITER(S): 213 SOLUTION TOPICAL at 17:39

## 2019-10-09 RX ADMIN — OXYCODONE AND ACETAMINOPHEN 2 TABLET(S): 5; 325 TABLET ORAL at 17:56

## 2019-10-09 RX ADMIN — HEPARIN SODIUM 5000 UNIT(S): 5000 INJECTION INTRAVENOUS; SUBCUTANEOUS at 13:21

## 2019-10-09 RX ADMIN — FENTANYL CITRATE 12.5 MICROGRAM(S): 50 INJECTION INTRAVENOUS at 08:40

## 2019-10-09 RX ADMIN — OXYCODONE AND ACETAMINOPHEN 2 TABLET(S): 5; 325 TABLET ORAL at 17:35

## 2019-10-09 RX ADMIN — Medication 40 MILLIGRAM(S): at 19:30

## 2019-10-09 RX ADMIN — CHLORHEXIDINE GLUCONATE 5 MILLILITER(S): 213 SOLUTION TOPICAL at 05:49

## 2019-10-09 RX ADMIN — Medication 2000 MILLIGRAM(S): at 21:29

## 2019-10-09 RX ADMIN — FENTANYL CITRATE 12.5 MICROGRAM(S): 50 INJECTION INTRAVENOUS at 09:00

## 2019-10-09 RX ADMIN — OXYCODONE AND ACETAMINOPHEN 2 TABLET(S): 5; 325 TABLET ORAL at 11:50

## 2019-10-09 RX ADMIN — FENTANYL CITRATE 25 MICROGRAM(S): 50 INJECTION INTRAVENOUS at 19:35

## 2019-10-09 RX ADMIN — ATORVASTATIN CALCIUM 80 MILLIGRAM(S): 80 TABLET, FILM COATED ORAL at 21:29

## 2019-10-09 RX ADMIN — Medication 2000 MILLIGRAM(S): at 05:48

## 2019-10-09 RX ADMIN — CHLORHEXIDINE GLUCONATE 5 MILLILITER(S): 213 SOLUTION TOPICAL at 13:21

## 2019-10-09 RX ADMIN — Medication 50 GRAM(S): at 00:29

## 2019-10-09 RX ADMIN — HEPARIN SODIUM 5000 UNIT(S): 5000 INJECTION INTRAVENOUS; SUBCUTANEOUS at 05:48

## 2019-10-09 RX ADMIN — TAMSULOSIN HYDROCHLORIDE 0.4 MILLIGRAM(S): 0.4 CAPSULE ORAL at 21:29

## 2019-10-09 RX ADMIN — FENTANYL CITRATE 25 MICROGRAM(S): 50 INJECTION INTRAVENOUS at 07:29

## 2019-10-09 RX ADMIN — CLOPIDOGREL BISULFATE 75 MILLIGRAM(S): 75 TABLET, FILM COATED ORAL at 11:20

## 2019-10-09 RX ADMIN — MORPHINE SULFATE 1 MILLIGRAM(S): 50 CAPSULE, EXTENDED RELEASE ORAL at 14:00

## 2019-10-09 NOTE — PHYSICAL THERAPY INITIAL EVALUATION ADULT - GENERAL OBSERVATIONS, REHAB EVAL
patient received supine with no acute distress. +EKG +Ivonne +central line +chest tube to wall suction x 3, +high flow NC FiO2 60% 50L/min, +clark +right LE ace wrap clean/dry/intact

## 2019-10-09 NOTE — PHYSICAL THERAPY INITIAL EVALUATION ADULT - PERTINENT HX OF CURRENT PROBLEM, REHAB EVAL
79 year old male presented to [facility] ED (10/7/2019) with unstable angina with plan for cardiac catheterization with possible intervention if clinically indicated.

## 2019-10-09 NOTE — PROGRESS NOTE ADULT - SUBJECTIVE AND OBJECTIVE BOX
CTICU  CRITICAL  CARE  attending     Hand off received 					   Pertinent clinical, laboratory, radiographic, hemodynamic, echocardiographic, respiratory data, microbiologic data and chart were reviewed and analyzed frequently throughout the course of the day and night    Patient seen and examined with CTS/ SH attending at bedside    80 yo male, former smoker with a PMhx of HTN, hyperlipidemia, Renal artery stenosis s/p Left renal stent placement (5/2011), known CAD s/p MI treated with thrombectomy/ROBERTA pLAD and PTCA/ROBERTA mLAD ([facility] 6/15/2010) followed by ROBERTA to prox/mid/distal LCx with tandemheart LV assist device ([facility]; 6/21/2010) presented to [facility] ED (10/7/2019) this AM with the complaint of 5/10 substernal chest discomfort described as someone "standing on his chest" associated with back ache that began around 4:30 AM this morning. Patient admits in recent weeks he had two episodes of similar chest discomfort. He denies palpitations, dizziness, syncope, headache, leg edema, CAMACHO/SOB. Patient went for his annular PCP evaluation with Dr. Mohsen Patiño three weeks ago and was told his EKG was changed, Echo was abnormal and was referred for cardiology evaluation 10/17/2019. Patient admits he has not seen a cardiologist in approximately 8 years and has not had any recent ischemic work up. Upon arrival to ED; BP: 144/82, HR: 70s, RR: 16, Afebrile, O2: 98F. 12 Lead EKG revealed Sinus Rhythm with TWI in II, III, ST depressions In V4-V6.  Troponin negative x1. Wet read of frontal CXR negative. Patient received  mg x one dose, Norvasc 10 mg x one dose, Coreg 25 mg x one dose and NTG x two doses. Upon assessment of PA, patient still with 2/10 chest discomfort. Patient now presents for cardiac catheterization with possible intervention if clinically indicated due to patient’s risk factors, extensive CAD and unstable angina.         HEALTH ISSUES - PROBLEM Dx:  HLD (hyperlipidemia): HLD (hyperlipidemia)  HTN (hypertension): HTN (hypertension)  Renal artery stenosis: Renal artery stenosis  CAD (coronary artery disease): CAD (coronary artery disease)      FAMILY HISTORY:  PAST MEDICAL & SURGICAL HISTORY:  Gout  HLD (hyperlipidemia)  HTN (hypertension)  Renal artery stenosis  CAD (coronary artery disease)  H/O renal artery stenosis: s/p stent  S/P coronary artery stent placement    Patient is a 79y old  Male who presents with CAD  S/P CABG.     14 system review was unremarkable    Vital signs, hemodynamic and respiratory parameters were reviewed from the bedside nursing flow sheet.  ICU Vital Signs Last 24 Hrs  T(C): 36.5 (09 Oct 2019 20:50), Max: 36.9 (09 Oct 2019 14:00)  T(F): 97.7 (09 Oct 2019 20:50), Max: 98.4 (09 Oct 2019 14:00)  HR: 82 (09 Oct 2019 22:00) (62 - 105)  BP: 114/62 (09 Oct 2019 10:00) (114/62 - 114/62)  BP(mean): 81 (09 Oct 2019 10:00) (81 - 81)  ABP: 132/44 (09 Oct 2019 22:00) (96/44 - 168/48)  ABP(mean): 72 (09 Oct 2019 22:00) (62 - 106)  RR: 22 (09 Oct 2019 22:00) (3 - 32)  SpO2: 95% (09 Oct 2019 22:00) (89% - 100%)    Adult Advanced Hemodynamics Last 24 Hrs  CVP(mm Hg): 4 (09 Oct 2019 20:00) (4 - 20)  CVP(cm H2O): --  CO: --  CI: --  PA: --  PA(mean): --  PCWP: --  SVR: --  SVRI: --  PVR: --  PVRI: --, ABG - ( 09 Oct 2019 19:32 )  pH, Arterial: 7.49  pH, Blood: x     /  pCO2: 31    /  pO2: 58    / HCO3: 23    / Base Excess: 0.5   /  SaO2: 91                Mode: CPAP with PS  FiO2: 50  PEEP: 8  PS: 10  MAP: 11  PIP: 10    Intake and output was reviewed and the fluid balance was calculated  Daily     Daily   I&O's Summary    08 Oct 2019 07:01  -  09 Oct 2019 07:00  --------------------------------------------------------  IN: 4994.6 mL / OUT: 2805 mL / NET: 2189.6 mL    09 Oct 2019 07:01  -  09 Oct 2019 22:11  --------------------------------------------------------  IN: 770 mL / OUT: 2370 mL / NET: -1600 mL        All lines and drain sites were assessed    Neuro: No change in the mental status from the baseline. Moves all 4 extremities.  Neck: No JVD.  CVS: S1, S1, No S3.  Lungs: Good air entry bilaterally.   Abd: Soft. No tenderness. + Bowel sounds.  Vascular: + DP/PT.  Extremities: No edema.  Lymphatic: Normal.  Skin: No abnormalities.      labs  CBC Full  -  ( 09 Oct 2019 16:50 )  WBC Count : 16.32 K/uL  RBC Count : 2.99 M/uL  Hemoglobin : 9.4 g/dL  Hematocrit : 26.8 %  Platelet Count - Automated : 136 K/uL  Mean Cell Volume : 89.6 fl  Mean Cell Hemoglobin : 31.4 pg  Mean Cell Hemoglobin Concentration : 35.1 gm/dL  Auto Neutrophil # : x  Auto Lymphocyte # : x  Auto Monocyte # : x  Auto Eosinophil # : x  Auto Basophil # : x  Auto Neutrophil % : x  Auto Lymphocyte % : x  Auto Monocyte % : x  Auto Eosinophil % : x  Auto Basophil % : x    10-09    135  |  98  |  14  ----------------------------<  137<H>  3.8   |  24  |  0.85    Ca    8.5      09 Oct 2019 16:50  Phos  3.5     10-09  Mg     1.8     10-09    TPro  6.0  /  Alb  3.8  /  TBili  1.2  /  DBili  x   /  AST  44<H>  /  ALT  11  /  AlkPhos  45  10-09    PT/INR - ( 09 Oct 2019 09:20 )   PT: 15.5 sec;   INR: 1.36          PTT - ( 09 Oct 2019 16:50 )  PTT:30.4 sec  The current medications were reviewed   MEDICATIONS  (STANDING):  albumin human  5% IVPB 250 milliLiter(s) IV Intermittent every 10 minutes  aspirin enteric coated 81 milliGRAM(s) Oral daily  atorvastatin 80 milliGRAM(s) Oral at bedtime  ceFAZolin  Injectable. 2000 milliGRAM(s) IV Push every 8 hours  chlorhexidine 2% Cloths 1 Application(s) Topical daily  clopidogrel Tablet 75 milliGRAM(s) Oral daily  dextrose 50% Injectable 50 milliLiter(s) IV Push every 15 minutes  dextrose 50% Injectable 25 milliLiter(s) IV Push every 15 minutes  docusate sodium 100 milliGRAM(s) Oral three times a day  heparin  Injectable 5000 Unit(s) SubCutaneous every 8 hours  pantoprazole    Tablet 40 milliGRAM(s) Oral before breakfast  senna 1 Tablet(s) Oral at bedtime  sodium chloride 0.9%. 1000 milliLiter(s) (10 mL/Hr) IV Continuous <Continuous>  tamsulosin 0.4 milliGRAM(s) Oral at bedtime    MEDICATIONS  (PRN):  acetaminophen   Tablet .. 650 milliGRAM(s) Oral every 6 hours PRN Mild Pain (1 - 3)  fentaNYL    Injectable 25 MICROGram(s) IV Push every 3 hours PRN Severe Pain (7 - 10)  oxyCODONE    5 mG/acetaminophen 325 mG 1 Tablet(s) Oral every 6 hours PRN Moderate Pain (4 - 6)  oxyCODONE    5 mG/acetaminophen 325 mG 2 Tablet(s) Oral every 6 hours PRN Severe Pain (7 - 10)         PROBLEM LIST/ ASSESSMENT:  HEALTH ISSUES - PROBLEM Dx:  HLD (hyperlipidemia): HLD (hyperlipidemia)  HTN (hypertension): HTN (hypertension)  Renal artery stenosis: Renal artery stenosis  CAD (coronary artery disease): CAD (coronary artery disease)        Patient is a 79y old  Male who presents with CAD   S/P CABG  Reexplored for bleeding immediate post op.  Hemodynamically stable.  Good oxygenation.  Fair urine out put.  Overall doing well.      My plan includes :  Statin and Betablocker.  Dual antiplatelet Rx.  Close hemodynamic, ventilatory and drain monitoring and management  Monitor for arrhythmias and monitor parameters for organ perfusion  Monitor neurologic status  Monitor renal function.  Head of the bed should remain elevated to 45 deg .   Chest PT and IS will be encouraged  Conitor adequacy of oxygenation and ventilation and attempt to wean oxygen  Nutritional goals will be met using po eventually , ensure adequate caloric intake and monitor the same  Stress ulcer and VTE prophylaxis will be achieved    Glycemic control is satisfactory  Electrolytes have been repleted as necessary and wound care has been carried out. Pain control has been achieved.   Aggressive physical therapy and early mobility and ambulation goals will be met   The family was updated about the course and plan  CRITICAL CARE TIME SPENT in evaluation and management, reassessments, review and interpretation of labs and x-rays, ventilator and hemodynamic management, formulating a plan and coordinating care: ___30____ MIN.  Time does not include procedural time.  CTICU ATTENDING     					    Scott Mccann MD

## 2019-10-09 NOTE — PROGRESS NOTE ADULT - SUBJECTIVE AND OBJECTIVE BOX
INTERVAL HPI/OVERNIGHT EVENTS:    POD#1 CABG x 2; reexploration for bleeding - tamponade identified  EF 45%    80yo Male Hx tobacco use, HTN, chol, Renal artery stenosis - Left renal stent (2011), BPH, CAD/MI - stents w/thrombectomy; tandem assist ('10) presenting with CP    recent visit PCP - EKG was changed, Echo was abnormal - referred to cardiology 10/17.     EKG: sinus. TWI II, III, ST depressions In V4-V6.  CE (-)    Cath 10/7: LM normal, 99% mLAD, pLCx 100% with L-L collaterals, mRCA 100% with R-R collaterals.  IABP placed (Rt femoral)  admitted to ICU and CTS consulted    To OR 10/8  Urology consulted intraop for difficult clark placement - (+)BPH/large amount urine drained - 18 Coude placed     intraop:   No blood products given ; 2 L Crystalloid given   arrived to ICU on Shawn     RTOR - re-exploration for bleeding - Bleeding source identified:  3 U pRBC/2 FFP and 2L Crystalloid given    IABP removed in ICU and patient extubated 6:30a to HFNC (50/60) - baseline dopplerable pulses by report       PMHx includes but is not limited to:   Gout  HLD (hyperlipidemia)  HTN (hypertension)  Renal artery stenosis  CAD (coronary artery disease)  H/O renal artery stenosis: s/p stent  S/P coronary artery stent placement  BPH        ICU Vital Signs Last 24 Hrs  T(C): 36.2 (09 Oct 2019 05:01), Max: 36.8 (08 Oct 2019 13:00)  T(F): 97.2 (09 Oct 2019 05:01), Max: 98.3 (08 Oct 2019 13:00)  HR: 72 (09 Oct 2019 08:00) (56 - 78)  BP: 143/49 (08 Oct 2019 13:10) (127/58 - 154/65)  BP(mean): 76 (08 Oct 2019 12:00) (76 - 97)  ABP: 116/38 (09 Oct 2019 08:00) (74/34 - 168/54)  ABP(mean): 62 (09 Oct 2019 08:00) (52 - 106)  RR: 17 (09 Oct 2019 08:00) (3 - 20)  SpO2: 96% (09 Oct 2019 08:00) (93% - 100%)    Qtts:     I&O's Summary    08 Oct 2019 07:  -  09 Oct 2019 07:00  --------------------------------------------------------  IN: 4994.6 mL / OUT: 2805 mL / NET: 2189.6 mL    09 Oct 2019 07:  -  09 Oct 2019 08:36  --------------------------------------------------------  IN: 30 mL / OUT: 50 mL / NET: -20 mL        Mode: CPAP with PS  FiO2: 50  PEEP: 8  PS: 10  MAP: 11  PIP: 10      Physical Exam    Heart  Lungs  Abd  Ext  Chest  Neuro  Skin    LABS:                        9.8    14.21 )-----------( 161      ( 09 Oct 2019 03:28 )             28.7     10-    134<L>  |  101  |  14  ----------------------------<  130<H>  4.2   |  22  |  0.78    Ca    8.6      09 Oct 2019 03:28  Phos  3.2     10-  Mg     1.9     10-    TPro  5.3<L>  /  Alb  3.5  /  TBili  2.4<H>  /  DBili  x   /  AST  20  /  ALT  10  /  AlkPhos  39<L>  10-09    PT/INR - ( 09 Oct 2019 03:28 )   PT: 15.2 sec;   INR: 1.33          PTT - ( 09 Oct 2019 03:28 )  PTT:26.1 sec  Urinalysis Basic - ( 07 Oct 2019 23:31 )    Color: Yellow / Appearance: Clear / S.010 / pH: x  Gluc: x / Ketone: NEGATIVE  / Bili: Negative / Urobili: 0.2 E.U./dL   Blood: x / Protein: NEGATIVE mg/dL / Nitrite: NEGATIVE   Leuk Esterase: NEGATIVE / RBC: < 5 /HPF / WBC < 5 /HPF   Sq Epi: x / Non Sq Epi: 0-5 /HPF / Bacteria: Present /HPF      ABG - ( 09 Oct 2019 07:48 )  pH, Arterial: 7.43  pH, Blood: x     /  pCO2: 33    /  pO2: 78    / HCO3: 22    / Base Excess: -2.1  /  SaO2: 95                  RADIOLOGY & ADDITIONAL STUDIES:    I have spent/provided stated minutes of critical care time to this patient: INTERVAL HPI/OVERNIGHT EVENTS:    POD#1 CABG x 2; reexploration for bleeding - tamponade identified  EF 45%    80yo Male Hx tobacco use, HTN, chol, Renal artery stenosis - Left renal stent (5/2011), BPH, CAD/MI - stents w/thrombectomy; tandem assist ('10) presenting with CP    recent visit PCP - EKG was changed, Echo was abnormal - referred to cardiology 10/17.     EKG: sinus. TWI II, III, ST depressions In V4-V6.  CE (-)    Cath 10/7: LM normal, 99% mLAD, pLCx 100% with L-L collaterals, mRCA 100% with R-R collaterals.  IABP placed (Rt femoral)  admitted to ICU and CTS consulted    To OR 10/8  Urology consulted intraop for difficult clark placement - (+)BPH/large amount urine drained - 18 Coude placed     intraop:   No blood products given ; 2 L Crystalloid given   arrived to ICU on Shawn     RTOR - re-exploration for bleeding - Bleeding source identified:  3 U pRBC/2 FFP and 2L Crystalloid given    IABP removed in ICU and patient extubated 6:30a to HFNC (50/60) - baseline dopplerable pulses by report     patient awake/alert and interactive - reporting incisional pain at this time - IV pain meds given to address - swallow assessment (P) post extubation at this time    PMHx includes but is not limited to:   Gout  HLD (hyperlipidemia)  HTN (hypertension)  Renal artery stenosis  CAD (coronary artery disease)  H/O renal artery stenosis: s/p stent  S/P coronary artery stent placement  BPH    ICU Vital Signs Last 24 Hrs  T(C): 36.2 (09 Oct 2019 05:01), Max: 36.8 (08 Oct 2019 13:00)  T(F): 97.2 (09 Oct 2019 05:01), Max: 98.3 (08 Oct 2019 13:00)  HR: 72 (09 Oct 2019 08:00) (56 - 78) sinus  BP: 143/49 (08 Oct 2019 13:10) (127/58 - 154/65)  BP(mean): 76 (08 Oct 2019 12:00) (76 - 97)  ABP: 116/38 (09 Oct 2019 08:00) (74/34 - 168/54)  ABP(mean): 62 (09 Oct 2019 08:00) (52 - 106)  SpO2: 96% (09 Oct 2019 08:00) (93% - 100%) HFNC 50/60    Qtts: None    I&O's Summary    08 Oct 2019 07:01  -  09 Oct 2019 07:00  --------------------------------------------------------  IN: 4994.6 mL / OUT: 2805 mL / NET: 2189.6 mL    09 Oct 2019 07:01  -  09 Oct 2019 08:36  --------------------------------------------------------  IN: 30 mL / OUT: 50 mL / NET: -20 mL    Physical Exam    Heart - regular (-)rub/gallop  Lungs - CTA anterior/laterally. no rhonchi/wheeze  Abd (+)BS soft NTND (-)r/r/g  Ext -warm to touch - peripheral pulses readily dopplerable ; prior IABP cannulation site clear  Chest - op bandage in place  Neuro - alert/oriented and interactive - non-focal   Skin - no rash     LABS:                        9.8    14.21 )-----------( 161      ( 09 Oct 2019 03:28 )             28.7     10-09    134<L>  |  101  |  14  ----------------------------<  130<H>  4.2   |  22  |  0.78    Ca    8.6      09 Oct 2019 03:28  Phos  3.2     10-09  Mg     1.9     10-09    TPro  5.3<L>  /  Alb  3.5  /  TBili  2.4<H>  /  DBili  x   /  AST  20  /  ALT  10  /  AlkPhos  39<L>  10-09    PT/INR - ( 09 Oct 2019 03:28 )   PT: 15.2 sec;   INR: 1.33     PTT - ( 09 Oct 2019 03:28 )  PTT:26.1 sec    ABG - ( 09 Oct 2019 07:48 ) 7.43/33/78/95    RADIOLOGY & ADDITIONAL STUDIES: reviewed    Patient with multiple medical problems including known CAD now POD#1 CABG with RTOR for bleeding in short interval - stasis achieved and now extubated/off pressors    1. CV   hemodynamically stable  sinus rhythm  noted trend LA - last 3.7 . to repeat and trend - suspect needs volume  ASA/Plavix/statin  complete periop Abx prophylaxis - tolerating cefazolin - per patient mild rash and unclear if related to PCN at the time - had tolerated prior     2. Pulm   extubated to [facility] this am   recheck ABG to ensure adequate oxygenation and ventilation  titrate supplemental oxygen down as clinical scenario allows  pain management - incentive spirometry and anticipate OOB to chair and ambulation with staff assist later today    3. Renal/Urology  Hx BPH -   intraop Urology consult for coude placement  restarted on Flomax  urology to be contacted prior to clark removal   monitor UO/Lytes and Cr - 0.78    4. Endocrine  maintain glycemic control   ISS  ; HgA1c 4.9    Initiate diet once confirmed pass bedside swallow assessment  pain management  bowel regimen     DVT and GI prophylaxis    d/w patinet/staff and CTS    I have spent/provided stated minutes of critical care time to this patient: 90 min

## 2019-10-10 LAB
ALBUMIN SERPL ELPH-MCNC: 3.5 G/DL — SIGNIFICANT CHANGE UP (ref 3.3–5)
ALBUMIN SERPL ELPH-MCNC: 3.9 G/DL — SIGNIFICANT CHANGE UP (ref 3.3–5)
ALBUMIN SERPL ELPH-MCNC: 4 G/DL — SIGNIFICANT CHANGE UP (ref 3.3–5)
ALP SERPL-CCNC: 48 U/L — SIGNIFICANT CHANGE UP (ref 40–120)
ALP SERPL-CCNC: 58 U/L — SIGNIFICANT CHANGE UP (ref 40–120)
ALP SERPL-CCNC: 60 U/L — SIGNIFICANT CHANGE UP (ref 40–120)
ALT FLD-CCNC: 10 U/L — SIGNIFICANT CHANGE UP (ref 10–45)
ALT FLD-CCNC: 11 U/L — SIGNIFICANT CHANGE UP (ref 10–45)
ALT FLD-CCNC: 12 U/L — SIGNIFICANT CHANGE UP (ref 10–45)
ANION GAP SERPL CALC-SCNC: 11 MMOL/L — SIGNIFICANT CHANGE UP (ref 5–17)
ANION GAP SERPL CALC-SCNC: 12 MMOL/L — SIGNIFICANT CHANGE UP (ref 5–17)
ANION GAP SERPL CALC-SCNC: 13 MMOL/L — SIGNIFICANT CHANGE UP (ref 5–17)
APTT BLD: 26.9 SEC — LOW (ref 27.5–36.3)
APTT BLD: 27.8 SEC — SIGNIFICANT CHANGE UP (ref 27.5–36.3)
APTT BLD: 28.4 SEC — SIGNIFICANT CHANGE UP (ref 27.5–36.3)
AST SERPL-CCNC: 51 U/L — HIGH (ref 10–40)
AST SERPL-CCNC: 52 U/L — HIGH (ref 10–40)
AST SERPL-CCNC: 53 U/L — HIGH (ref 10–40)
BILIRUB SERPL-MCNC: 1 MG/DL — SIGNIFICANT CHANGE UP (ref 0.2–1.2)
BUN SERPL-MCNC: 15 MG/DL — SIGNIFICANT CHANGE UP (ref 7–23)
BUN SERPL-MCNC: 17 MG/DL — SIGNIFICANT CHANGE UP (ref 7–23)
BUN SERPL-MCNC: 20 MG/DL — SIGNIFICANT CHANGE UP (ref 7–23)
CALCIUM SERPL-MCNC: 8.3 MG/DL — LOW (ref 8.4–10.5)
CALCIUM SERPL-MCNC: 8.8 MG/DL — SIGNIFICANT CHANGE UP (ref 8.4–10.5)
CALCIUM SERPL-MCNC: 8.8 MG/DL — SIGNIFICANT CHANGE UP (ref 8.4–10.5)
CHLORIDE SERPL-SCNC: 95 MMOL/L — LOW (ref 96–108)
CHLORIDE SERPL-SCNC: 97 MMOL/L — SIGNIFICANT CHANGE UP (ref 96–108)
CHLORIDE SERPL-SCNC: 98 MMOL/L — SIGNIFICANT CHANGE UP (ref 96–108)
CO2 SERPL-SCNC: 24 MMOL/L — SIGNIFICANT CHANGE UP (ref 22–31)
CREAT SERPL-MCNC: 0.91 MG/DL — SIGNIFICANT CHANGE UP (ref 0.5–1.3)
CREAT SERPL-MCNC: 0.94 MG/DL — SIGNIFICANT CHANGE UP (ref 0.5–1.3)
CREAT SERPL-MCNC: 0.96 MG/DL — SIGNIFICANT CHANGE UP (ref 0.5–1.3)
GAS PNL BLDA: SIGNIFICANT CHANGE UP
GLUCOSE SERPL-MCNC: 134 MG/DL — HIGH (ref 70–99)
GLUCOSE SERPL-MCNC: 138 MG/DL — HIGH (ref 70–99)
GLUCOSE SERPL-MCNC: 140 MG/DL — HIGH (ref 70–99)
HCT VFR BLD CALC: 25.6 % — LOW (ref 39–50)
HCT VFR BLD CALC: 25.8 % — LOW (ref 39–50)
HCT VFR BLD CALC: 27.1 % — LOW (ref 39–50)
HGB BLD-MCNC: 8.8 G/DL — LOW (ref 13–17)
HGB BLD-MCNC: 8.8 G/DL — LOW (ref 13–17)
HGB BLD-MCNC: 9.5 G/DL — LOW (ref 13–17)
INR BLD: 1.59 — HIGH (ref 0.88–1.16)
INR BLD: 1.6 — HIGH (ref 0.88–1.16)
INR BLD: 1.71 — HIGH (ref 0.88–1.16)
LACTATE SERPL-SCNC: 1.3 MMOL/L — SIGNIFICANT CHANGE UP (ref 0.5–2)
LACTATE SERPL-SCNC: 1.6 MMOL/L — SIGNIFICANT CHANGE UP (ref 0.5–2)
LACTATE SERPL-SCNC: 1.8 MMOL/L — SIGNIFICANT CHANGE UP (ref 0.5–2)
MAGNESIUM SERPL-MCNC: 1.8 MG/DL — SIGNIFICANT CHANGE UP (ref 1.6–2.6)
MAGNESIUM SERPL-MCNC: 2.1 MG/DL — SIGNIFICANT CHANGE UP (ref 1.6–2.6)
MAGNESIUM SERPL-MCNC: 2.3 MG/DL — SIGNIFICANT CHANGE UP (ref 1.6–2.6)
MCHC RBC-ENTMCNC: 30.7 PG — SIGNIFICANT CHANGE UP (ref 27–34)
MCHC RBC-ENTMCNC: 31.2 PG — SIGNIFICANT CHANGE UP (ref 27–34)
MCHC RBC-ENTMCNC: 31.5 PG — SIGNIFICANT CHANGE UP (ref 27–34)
MCHC RBC-ENTMCNC: 34.1 GM/DL — SIGNIFICANT CHANGE UP (ref 32–36)
MCHC RBC-ENTMCNC: 34.4 GM/DL — SIGNIFICANT CHANGE UP (ref 32–36)
MCHC RBC-ENTMCNC: 35.1 GM/DL — SIGNIFICANT CHANGE UP (ref 32–36)
MCV RBC AUTO: 89.7 FL — SIGNIFICANT CHANGE UP (ref 80–100)
MCV RBC AUTO: 89.9 FL — SIGNIFICANT CHANGE UP (ref 80–100)
MCV RBC AUTO: 90.8 FL — SIGNIFICANT CHANGE UP (ref 80–100)
NRBC # BLD: 0 /100 WBCS — SIGNIFICANT CHANGE UP (ref 0–0)
PHOSPHATE SERPL-MCNC: 2.4 MG/DL — LOW (ref 2.5–4.5)
PHOSPHATE SERPL-MCNC: 2.7 MG/DL — SIGNIFICANT CHANGE UP (ref 2.5–4.5)
PHOSPHATE SERPL-MCNC: 3.2 MG/DL — SIGNIFICANT CHANGE UP (ref 2.5–4.5)
PLATELET # BLD AUTO: 125 K/UL — LOW (ref 150–400)
PLATELET # BLD AUTO: 140 K/UL — LOW (ref 150–400)
PLATELET # BLD AUTO: 144 K/UL — LOW (ref 150–400)
POTASSIUM SERPL-MCNC: 3.7 MMOL/L — SIGNIFICANT CHANGE UP (ref 3.5–5.3)
POTASSIUM SERPL-MCNC: 4.1 MMOL/L — SIGNIFICANT CHANGE UP (ref 3.5–5.3)
POTASSIUM SERPL-MCNC: 4.1 MMOL/L — SIGNIFICANT CHANGE UP (ref 3.5–5.3)
POTASSIUM SERPL-SCNC: 3.7 MMOL/L — SIGNIFICANT CHANGE UP (ref 3.5–5.3)
POTASSIUM SERPL-SCNC: 4.1 MMOL/L — SIGNIFICANT CHANGE UP (ref 3.5–5.3)
POTASSIUM SERPL-SCNC: 4.1 MMOL/L — SIGNIFICANT CHANGE UP (ref 3.5–5.3)
PROT SERPL-MCNC: 6 G/DL — SIGNIFICANT CHANGE UP (ref 6–8.3)
PROT SERPL-MCNC: 6.4 G/DL — SIGNIFICANT CHANGE UP (ref 6–8.3)
PROT SERPL-MCNC: 6.5 G/DL — SIGNIFICANT CHANGE UP (ref 6–8.3)
PROTHROM AB SERPL-ACNC: 18.2 SEC — HIGH (ref 10–12.9)
PROTHROM AB SERPL-ACNC: 18.4 SEC — HIGH (ref 10–12.9)
PROTHROM AB SERPL-ACNC: 19.7 SEC — HIGH (ref 10–12.9)
RBC # BLD: 2.82 M/UL — LOW (ref 4.2–5.8)
RBC # BLD: 2.87 M/UL — LOW (ref 4.2–5.8)
RBC # BLD: 3.02 M/UL — LOW (ref 4.2–5.8)
RBC # FLD: 15.5 % — HIGH (ref 10.3–14.5)
RBC # FLD: 15.8 % — HIGH (ref 10.3–14.5)
RBC # FLD: 15.9 % — HIGH (ref 10.3–14.5)
SODIUM SERPL-SCNC: 132 MMOL/L — LOW (ref 135–145)
SODIUM SERPL-SCNC: 133 MMOL/L — LOW (ref 135–145)
SODIUM SERPL-SCNC: 133 MMOL/L — LOW (ref 135–145)
WBC # BLD: 16.6 K/UL — HIGH (ref 3.8–10.5)
WBC # BLD: 19.75 K/UL — HIGH (ref 3.8–10.5)
WBC # BLD: 19.96 K/UL — HIGH (ref 3.8–10.5)
WBC # FLD AUTO: 16.6 K/UL — HIGH (ref 3.8–10.5)
WBC # FLD AUTO: 19.75 K/UL — HIGH (ref 3.8–10.5)
WBC # FLD AUTO: 19.96 K/UL — HIGH (ref 3.8–10.5)

## 2019-10-10 PROCEDURE — 93010 ELECTROCARDIOGRAM REPORT: CPT

## 2019-10-10 PROCEDURE — 71045 X-RAY EXAM CHEST 1 VIEW: CPT | Mod: 26,77,76

## 2019-10-10 PROCEDURE — 99291 CRITICAL CARE FIRST HOUR: CPT

## 2019-10-10 PROCEDURE — 71045 X-RAY EXAM CHEST 1 VIEW: CPT | Mod: 26

## 2019-10-10 PROCEDURE — 99292 CRITICAL CARE ADDL 30 MIN: CPT

## 2019-10-10 RX ORDER — POTASSIUM CHLORIDE 20 MEQ
20 PACKET (EA) ORAL ONCE
Refills: 0 | Status: COMPLETED | OUTPATIENT
Start: 2019-10-10 | End: 2019-10-10

## 2019-10-10 RX ORDER — FUROSEMIDE 40 MG
20 TABLET ORAL ONCE
Refills: 0 | Status: COMPLETED | OUTPATIENT
Start: 2019-10-10 | End: 2019-10-10

## 2019-10-10 RX ORDER — METOPROLOL TARTRATE 50 MG
12.5 TABLET ORAL EVERY 6 HOURS
Refills: 0 | Status: DISCONTINUED | OUTPATIENT
Start: 2019-10-10 | End: 2019-10-11

## 2019-10-10 RX ORDER — AMIODARONE HYDROCHLORIDE 400 MG/1
150 TABLET ORAL ONCE
Refills: 0 | Status: COMPLETED | OUTPATIENT
Start: 2019-10-10 | End: 2019-10-10

## 2019-10-10 RX ORDER — FUROSEMIDE 40 MG
40 TABLET ORAL ONCE
Refills: 0 | Status: COMPLETED | OUTPATIENT
Start: 2019-10-10 | End: 2019-10-10

## 2019-10-10 RX ORDER — METOPROLOL TARTRATE 50 MG
12.5 TABLET ORAL EVERY 12 HOURS
Refills: 0 | Status: DISCONTINUED | OUTPATIENT
Start: 2019-10-10 | End: 2019-10-10

## 2019-10-10 RX ORDER — POTASSIUM CHLORIDE 20 MEQ
40 PACKET (EA) ORAL ONCE
Refills: 0 | Status: COMPLETED | OUTPATIENT
Start: 2019-10-10 | End: 2019-10-10

## 2019-10-10 RX ORDER — MAGNESIUM SULFATE 500 MG/ML
2 VIAL (ML) INJECTION ONCE
Refills: 0 | Status: COMPLETED | OUTPATIENT
Start: 2019-10-10 | End: 2019-10-10

## 2019-10-10 RX ORDER — CALCIUM GLUCONATE 100 MG/ML
2 VIAL (ML) INTRAVENOUS ONCE
Refills: 0 | Status: COMPLETED | OUTPATIENT
Start: 2019-10-10 | End: 2019-10-10

## 2019-10-10 RX ORDER — ALLOPURINOL 300 MG
300 TABLET ORAL
Refills: 0 | Status: DISCONTINUED | OUTPATIENT
Start: 2019-10-10 | End: 2019-10-17

## 2019-10-10 RX ORDER — AMIODARONE HYDROCHLORIDE 400 MG/1
1 TABLET ORAL
Qty: 900 | Refills: 0 | Status: DISCONTINUED | OUTPATIENT
Start: 2019-10-10 | End: 2019-10-11

## 2019-10-10 RX ORDER — POTASSIUM CHLORIDE 20 MEQ
10 PACKET (EA) ORAL ONCE
Refills: 0 | Status: COMPLETED | OUTPATIENT
Start: 2019-10-10 | End: 2019-10-10

## 2019-10-10 RX ORDER — SODIUM,POTASSIUM PHOSPHATES 278-250MG
1 POWDER IN PACKET (EA) ORAL THREE TIMES A DAY
Refills: 0 | Status: COMPLETED | OUTPATIENT
Start: 2019-10-10 | End: 2019-10-11

## 2019-10-10 RX ADMIN — SENNA PLUS 1 TABLET(S): 8.6 TABLET ORAL at 21:16

## 2019-10-10 RX ADMIN — Medication 100 MILLIGRAM(S): at 06:34

## 2019-10-10 RX ADMIN — Medication 2000 MILLIGRAM(S): at 06:33

## 2019-10-10 RX ADMIN — HEPARIN SODIUM 5000 UNIT(S): 5000 INJECTION INTRAVENOUS; SUBCUTANEOUS at 06:34

## 2019-10-10 RX ADMIN — Medication 12.5 MILLIGRAM(S): at 11:42

## 2019-10-10 RX ADMIN — AMIODARONE HYDROCHLORIDE 600 MILLIGRAM(S): 400 TABLET ORAL at 09:47

## 2019-10-10 RX ADMIN — CHLORHEXIDINE GLUCONATE 1 APPLICATION(S): 213 SOLUTION TOPICAL at 12:41

## 2019-10-10 RX ADMIN — Medication 12.5 MILLIGRAM(S): at 21:50

## 2019-10-10 RX ADMIN — OXYCODONE AND ACETAMINOPHEN 1 TABLET(S): 5; 325 TABLET ORAL at 06:52

## 2019-10-10 RX ADMIN — ATORVASTATIN CALCIUM 80 MILLIGRAM(S): 80 TABLET, FILM COATED ORAL at 21:15

## 2019-10-10 RX ADMIN — Medication 20 MILLIEQUIVALENT(S): at 09:29

## 2019-10-10 RX ADMIN — Medication 81 MILLIGRAM(S): at 11:42

## 2019-10-10 RX ADMIN — Medication 50 GRAM(S): at 05:33

## 2019-10-10 RX ADMIN — OXYCODONE AND ACETAMINOPHEN 1 TABLET(S): 5; 325 TABLET ORAL at 06:35

## 2019-10-10 RX ADMIN — HEPARIN SODIUM 5000 UNIT(S): 5000 INJECTION INTRAVENOUS; SUBCUTANEOUS at 21:15

## 2019-10-10 RX ADMIN — Medication 200 GRAM(S): at 10:01

## 2019-10-10 RX ADMIN — CLOPIDOGREL BISULFATE 75 MILLIGRAM(S): 75 TABLET, FILM COATED ORAL at 11:42

## 2019-10-10 RX ADMIN — Medication 12.5 MILLIGRAM(S): at 09:26

## 2019-10-10 RX ADMIN — AMIODARONE HYDROCHLORIDE 600 MILLIGRAM(S): 400 TABLET ORAL at 09:27

## 2019-10-10 RX ADMIN — FENTANYL CITRATE 25 MICROGRAM(S): 50 INJECTION INTRAVENOUS at 05:35

## 2019-10-10 RX ADMIN — Medication 1 PACKET(S): at 21:16

## 2019-10-10 RX ADMIN — PANTOPRAZOLE SODIUM 40 MILLIGRAM(S): 20 TABLET, DELAYED RELEASE ORAL at 06:35

## 2019-10-10 RX ADMIN — Medication 12.5 MILLIGRAM(S): at 17:26

## 2019-10-10 RX ADMIN — Medication 40 MILLIEQUIVALENT(S): at 19:14

## 2019-10-10 RX ADMIN — Medication 10 MILLIEQUIVALENT(S): at 17:27

## 2019-10-10 RX ADMIN — Medication 20 MILLIGRAM(S): at 17:26

## 2019-10-10 RX ADMIN — Medication 200 GRAM(S): at 19:15

## 2019-10-10 RX ADMIN — AMIODARONE HYDROCHLORIDE 33.33 MG/MIN: 400 TABLET ORAL at 10:02

## 2019-10-10 RX ADMIN — Medication 100 MILLIGRAM(S): at 21:16

## 2019-10-10 RX ADMIN — Medication 100 MILLIGRAM(S): at 13:12

## 2019-10-10 RX ADMIN — HEPARIN SODIUM 5000 UNIT(S): 5000 INJECTION INTRAVENOUS; SUBCUTANEOUS at 13:12

## 2019-10-10 RX ADMIN — Medication 300 MILLIGRAM(S): at 17:27

## 2019-10-10 RX ADMIN — TAMSULOSIN HYDROCHLORIDE 0.4 MILLIGRAM(S): 0.4 CAPSULE ORAL at 21:15

## 2019-10-10 RX ADMIN — FENTANYL CITRATE 25 MICROGRAM(S): 50 INJECTION INTRAVENOUS at 06:55

## 2019-10-10 RX ADMIN — Medication 40 MILLIGRAM(S): at 08:15

## 2019-10-10 NOTE — DIETITIAN INITIAL EVALUATION ADULT. - PROBLEM SELECTOR PLAN 2
-s/p Left renal artery stent @ [facility] 5/2011  -Has not had follow up since placement

## 2019-10-10 NOTE — CHART NOTE - NSCHARTNOTEFT_GEN_A_CORE
2 mediastinal chest tubes and 1 right pleural chest tube removed.  Tie downs left in place.  No air leaks noted prior to removing tubes.  Pt tolerated well.  Occlusive dressings applied.  CXR ordered.

## 2019-10-10 NOTE — CHART NOTE - NSCHARTNOTEFT_GEN_A_CORE
Post CT removal, CXR shows small B/L pneumothoraces, L>R.  Not large enough to warrant a tube placement (<10% each side).  Patient feels well, ambulating currently.  Remains on nasal CPAP.      Will repeat CXR in a few hours to follow up.

## 2019-10-10 NOTE — DIETITIAN INITIAL EVALUATION ADULT. - ENERGY NEEDS
Height: 5'7.5" Weight: 182lbs, IBW 154lbs+/-10%, %%, BMI 28.6  ABW used for calculations as pt between % of IBW.  Nutrient needs based on [facility] standards of care for maintenance in older adults.  Needs adjusted for post-op healing

## 2019-10-10 NOTE — DIETITIAN INITIAL EVALUATION ADULT. - ADD RECOMMEND
1) Continue on DASH diet  2) Honor pts food preferences  3) Encourage protein options 2/2 increased needs post-op 3. Consider adding on EnsureEnlive BID (700kcal, 40g pro) for additional kcal/pro if intake remains <50%

## 2019-10-10 NOTE — PROGRESS NOTE ADULT - SUBJECTIVE AND OBJECTIVE BOX
CTICU  CRITICAL  CARE  attending     Hand off received 					   Pertinent clinical, laboratory, radiographic, hemodynamic, echocardiographic, respiratory data, microbiologic data and chart were reviewed and analyzed frequently throughout the course of the day and night  Patient seen and examined with CTS/ SH attending at bedside  Pt is a 79y , Male, HEALTH ISSUES - PROBLEM Dx:  HLD (hyperlipidemia): HLD (hyperlipidemia)  HTN (hypertension): HTN (hypertension)  Renal artery stenosis: Renal artery stenosis  CAD (coronary artery disease): CAD (coronary artery disease)      , FAMILY HISTORY:  PAST MEDICAL & SURGICAL HISTORY:  Gout  HLD (hyperlipidemia)  HTN (hypertension)  Renal artery stenosis  CAD (coronary artery disease)  H/O renal artery stenosis: s/p stent  S/P coronary artery stent placement    Patient is a 79y old  Male who presents with a chief complaint of Chest pain (11 Oct 2019 10:43)      14 system review was unremarkable    Vital signs, hemodynamic and respiratory parameters were reviewed from the bedside nursing flowsheet.  ICU Vital Signs Last 24 Hrs  T(C): 36.3 (11 Oct 2019 18:26), Max: 37.3 (11 Oct 2019 05:01)  T(F): 97.4 (11 Oct 2019 18:26), Max: 99.1 (11 Oct 2019 05:01)  HR: 70 (11 Oct 2019 18:00) (62 - 88)  BP: --  BP(mean): --  ABP: 133/44 (11 Oct 2019 18:00) (112/40 - 156/44)  ABP(mean): 71 (11 Oct 2019 18:00) (60 - 84)  RR: 33 (11 Oct 2019 18:00) (12 - 33)  SpO2: 95% (11 Oct 2019 18:00) (88% - 97%)    Adult Advanced Hemodynamics Last 24 Hrs  CVP(mm Hg): 6 (11 Oct 2019 18:00) (1 - 19)  CVP(cm H2O): --  CO: --  CI: --  PA: --  PA(mean): --  PCWP: --  SVR: --  SVRI: --  PVR: --  PVRI: --, ABG - ( 11 Oct 2019 14:45 )  pH, Arterial: 7.50  pH, Blood: x     /  pCO2: 34    /  pO2: 59    / HCO3: 26    / Base Excess: 2.6   /  SaO2: 91                  Intake and output was reviewed and the fluid balance was calculated  Daily     Daily   I&O's Summary    10 Oct 2019 07:01  -  11 Oct 2019 07:00  --------------------------------------------------------  IN: 2362.9 mL / OUT: 2155 mL / NET: 207.9 mL    11 Oct 2019 07:01  -  11 Oct 2019 18:43  --------------------------------------------------------  IN: 1016.7 mL / OUT: 1008 mL / NET: 8.7 mL        All lines and drain sites were assessed  Glycemic trend was reviewedCAPILLARY BLOOD GLUCOSE      POCT Blood Glucose.: 147 mg/dL (09 Oct 2019 19:30)    No acute change in mental status  Auscultation of the chest reveals equal bs  Abdomen is soft  Extremities are warm and well perfused  Wounds appear clean and unremarkable  Antibiotics are periop    labs  CBC Full  -  ( 11 Oct 2019 14:45 )  WBC Count : 20.35 K/uL  RBC Count : 2.95 M/uL  Hemoglobin : 9.0 g/dL  Hematocrit : 26.5 %  Platelet Count - Automated : 133 K/uL  Mean Cell Volume : 89.8 fl  Mean Cell Hemoglobin : 30.5 pg  Mean Cell Hemoglobin Concentration : 34.0 gm/dL  Auto Neutrophil # : x  Auto Lymphocyte # : x  Auto Monocyte # : x  Auto Eosinophil # : x  Auto Basophil # : x  Auto Neutrophil % : x  Auto Lymphocyte % : x  Auto Monocyte % : x  Auto Eosinophil % : x  Auto Basophil % : x    10-11    128<L>  |  95<L>  |  22  ----------------------------<  132<H>  4.0   |  23  |  0.95    Ca    8.6      11 Oct 2019 14:45  Phos  2.1     10-11  Mg     2.3     10-11    TPro  5.9<L>  /  Alb  3.3  /  TBili  1.6<H>  /  DBili  x   /  AST  47<H>  /  ALT  13  /  AlkPhos  64  10-11    PT/INR - ( 11 Oct 2019 14:45 )   PT: 15.1 sec;   INR: 1.32          PTT - ( 11 Oct 2019 14:45 )  PTT:25.9 sec  The current medications were reviewed   MEDICATIONS  (STANDING):  albumin human  5% IVPB 250 milliLiter(s) IV Intermittent every 10 minutes  allopurinol 300 milliGRAM(s) Oral two times a day  amiodarone    Tablet 400 milliGRAM(s) Oral every 8 hours  amiodarone    Tablet   Oral   aspirin enteric coated 81 milliGRAM(s) Oral daily  atorvastatin 80 milliGRAM(s) Oral at bedtime  chlorhexidine 2% Cloths 1 Application(s) Topical daily  clopidogrel Tablet 75 milliGRAM(s) Oral daily  docusate sodium 100 milliGRAM(s) Oral three times a day  heparin  Injectable 5000 Unit(s) SubCutaneous every 8 hours  metoprolol tartrate 25 milliGRAM(s) Oral every 6 hours  pantoprazole    Tablet 40 milliGRAM(s) Oral before breakfast  senna 1 Tablet(s) Oral at bedtime  sodium chloride 0.9%. 1000 milliLiter(s) (10 mL/Hr) IV Continuous <Continuous>  tamsulosin 0.4 milliGRAM(s) Oral at bedtime    MEDICATIONS  (PRN):  acetaminophen   Tablet .. 650 milliGRAM(s) Oral every 6 hours PRN Mild Pain (1 - 3)  oxyCODONE    5 mG/acetaminophen 325 mG 1 Tablet(s) Oral every 6 hours PRN Moderate Pain (4 - 6)  oxyCODONE    5 mG/acetaminophen 325 mG 2 Tablet(s) Oral every 6 hours PRN Severe Pain (7 - 10)  polyethylene glycol 3350 17 Gram(s) Oral daily PRN Constipation       PROBLEM LIST/ ASSESSMENT:  HEALTH ISSUES - PROBLEM Dx:  HLD (hyperlipidemia): HLD (hyperlipidemia)  HTN (hypertension): HTN (hypertension)  Renal artery stenosis: Renal artery stenosis  CAD (coronary artery disease): CAD (coronary artery disease)      ,   Patient is a 79y old  Male who presents with a chief complaint of Chest pain (11 Oct 2019 10:43)     s/p cardiac surgery          My plan includes :  close hemodynamic, ventilatory and drain monitoring and management per post op routine    Monitor for arrhythmias and monitor parameters for organ perfusion  beta blockade not administered due to hemodynamic instability and bradycardia  monitor neurologic status  Head of the bed should remain elevated to 45 deg .   chest PT and IS will be encouraged  monitor adequacy of oxygenation and ventilation and attempt to wean oxygen  antibiotic regimen will be tailored to the clinical, laboratory and microbiologic data  Nutritional goals will be met using po eventually , ensure adequate caloric intake and montior the same  Stress ulcer and VTE prophylaxis will be achieved    Glycemic control is satisfactory  Electrolytes have been repleted as necessary and wound care has been carried out. Pain control has been achieved.   agressive physical therapy and early mobility and ambulation goals will be met   The family was updated about the course and plan  CRITICAL CARE TIME personally provided by me  in evaluation and management, reassessments, review and interpretation of labs and x-rays, ventilator and hemodynamic management, formulating a plan and coordinating care: ___90____ MIN.  Time does not include procedural time.  CTICU ATTENDING     					    Lm Dunne MD

## 2019-10-10 NOTE — DIETITIAN INITIAL EVALUATION ADULT. - OTHER INFO
78yo M former smoker, w/ PMH of HTN, HLD, renal artery stenosis s/p L renal stent (2011), CARD s/p MI w/ thrombectomy and PES (ROBERTA to pLAD and PTCA/ROBERTA to mLAD in 6/21. P/w substernal CP/pressure, was ruled in for NSTEMI. Now s/p CABG x2, and reexploration for bleeding POD2. Pt seen in room, resting in chair, on nasal CPAP, w/ wife in room. Currently on a DASH/TLC diet and tolerating PO. Consumed ~25-50% of breakfast this am. Denies N/V, has yet to have a BM post-op. Discussed purpose of current diet order relative to heart health. Pt's wife reports they somewhat monitor intake (i.e. use vegetable oil instead of butter), but need to reduce Na intake. Provided Pt and wife w/ written edu on cardiac nutrition therapy, low Na seasonings, and heart healthy cooking tips. Wife was receptive and appreciative of information provided. Denies weight changes PTA. NKFA or dietary restrictions. Skin: surgical incision; GI WDL per flowsheet. RD to follow. Consider adding on ONS if intake does not

## 2019-10-11 LAB
ALBUMIN SERPL ELPH-MCNC: 2.9 G/DL — LOW (ref 3.3–5)
ALBUMIN SERPL ELPH-MCNC: 3.1 G/DL — LOW (ref 3.3–5)
ALBUMIN SERPL ELPH-MCNC: 3.3 G/DL — SIGNIFICANT CHANGE UP (ref 3.3–5)
ALP SERPL-CCNC: 60 U/L — SIGNIFICANT CHANGE UP (ref 40–120)
ALP SERPL-CCNC: 61 U/L — SIGNIFICANT CHANGE UP (ref 40–120)
ALP SERPL-CCNC: 64 U/L — SIGNIFICANT CHANGE UP (ref 40–120)
ALT FLD-CCNC: 12 U/L — SIGNIFICANT CHANGE UP (ref 10–45)
ALT FLD-CCNC: 13 U/L — SIGNIFICANT CHANGE UP (ref 10–45)
ALT FLD-CCNC: 13 U/L — SIGNIFICANT CHANGE UP (ref 10–45)
ANION GAP SERPL CALC-SCNC: 10 MMOL/L — SIGNIFICANT CHANGE UP (ref 5–17)
ANION GAP SERPL CALC-SCNC: 10 MMOL/L — SIGNIFICANT CHANGE UP (ref 5–17)
ANION GAP SERPL CALC-SCNC: 11 MMOL/L — SIGNIFICANT CHANGE UP (ref 5–17)
APTT BLD: 25.9 SEC — LOW (ref 27.5–36.3)
APTT BLD: 27 SEC — LOW (ref 27.5–36.3)
APTT BLD: 28.2 SEC — SIGNIFICANT CHANGE UP (ref 27.5–36.3)
AST SERPL-CCNC: 47 U/L — HIGH (ref 10–40)
AST SERPL-CCNC: 49 U/L — HIGH (ref 10–40)
AST SERPL-CCNC: 52 U/L — HIGH (ref 10–40)
BILIRUB SERPL-MCNC: 1.2 MG/DL — SIGNIFICANT CHANGE UP (ref 0.2–1.2)
BILIRUB SERPL-MCNC: 1.5 MG/DL — HIGH (ref 0.2–1.2)
BILIRUB SERPL-MCNC: 1.6 MG/DL — HIGH (ref 0.2–1.2)
BUN SERPL-MCNC: 20 MG/DL — SIGNIFICANT CHANGE UP (ref 7–23)
BUN SERPL-MCNC: 21 MG/DL — SIGNIFICANT CHANGE UP (ref 7–23)
BUN SERPL-MCNC: 22 MG/DL — SIGNIFICANT CHANGE UP (ref 7–23)
CALCIUM SERPL-MCNC: 8.6 MG/DL — SIGNIFICANT CHANGE UP (ref 8.4–10.5)
CALCIUM SERPL-MCNC: 8.7 MG/DL — SIGNIFICANT CHANGE UP (ref 8.4–10.5)
CALCIUM SERPL-MCNC: 8.9 MG/DL — SIGNIFICANT CHANGE UP (ref 8.4–10.5)
CHLORIDE SERPL-SCNC: 94 MMOL/L — LOW (ref 96–108)
CHLORIDE SERPL-SCNC: 95 MMOL/L — LOW (ref 96–108)
CHLORIDE SERPL-SCNC: 95 MMOL/L — LOW (ref 96–108)
CO2 SERPL-SCNC: 23 MMOL/L — SIGNIFICANT CHANGE UP (ref 22–31)
CO2 SERPL-SCNC: 24 MMOL/L — SIGNIFICANT CHANGE UP (ref 22–31)
CO2 SERPL-SCNC: 25 MMOL/L — SIGNIFICANT CHANGE UP (ref 22–31)
CREAT SERPL-MCNC: 0.95 MG/DL — SIGNIFICANT CHANGE UP (ref 0.5–1.3)
CREAT SERPL-MCNC: 1.02 MG/DL — SIGNIFICANT CHANGE UP (ref 0.5–1.3)
CREAT SERPL-MCNC: 1.02 MG/DL — SIGNIFICANT CHANGE UP (ref 0.5–1.3)
GAS PNL BLDA: SIGNIFICANT CHANGE UP
GLUCOSE SERPL-MCNC: 128 MG/DL — HIGH (ref 70–99)
GLUCOSE SERPL-MCNC: 132 MG/DL — HIGH (ref 70–99)
GLUCOSE SERPL-MCNC: 135 MG/DL — HIGH (ref 70–99)
HCT VFR BLD CALC: 22.2 % — LOW (ref 39–50)
HCT VFR BLD CALC: 23.9 % — LOW (ref 39–50)
HCT VFR BLD CALC: 26.5 % — LOW (ref 39–50)
HGB BLD-MCNC: 7.6 G/DL — LOW (ref 13–17)
HGB BLD-MCNC: 8 G/DL — LOW (ref 13–17)
HGB BLD-MCNC: 9 G/DL — LOW (ref 13–17)
INR BLD: 1.32 — HIGH (ref 0.88–1.16)
INR BLD: 1.39 — HIGH (ref 0.88–1.16)
INR BLD: 1.41 — HIGH (ref 0.88–1.16)
LACTATE SERPL-SCNC: 1.2 MMOL/L — SIGNIFICANT CHANGE UP (ref 0.5–2)
LACTATE SERPL-SCNC: 1.3 MMOL/L — SIGNIFICANT CHANGE UP (ref 0.5–2)
LACTATE SERPL-SCNC: 3.5 MMOL/L — HIGH (ref 0.5–2)
MAGNESIUM SERPL-MCNC: 1.6 MG/DL — SIGNIFICANT CHANGE UP (ref 1.6–2.6)
MAGNESIUM SERPL-MCNC: 2 MG/DL — SIGNIFICANT CHANGE UP (ref 1.6–2.6)
MAGNESIUM SERPL-MCNC: 2.3 MG/DL — SIGNIFICANT CHANGE UP (ref 1.6–2.6)
MCHC RBC-ENTMCNC: 30.5 PG — SIGNIFICANT CHANGE UP (ref 27–34)
MCHC RBC-ENTMCNC: 30.5 PG — SIGNIFICANT CHANGE UP (ref 27–34)
MCHC RBC-ENTMCNC: 30.9 PG — SIGNIFICANT CHANGE UP (ref 27–34)
MCHC RBC-ENTMCNC: 33.5 GM/DL — SIGNIFICANT CHANGE UP (ref 32–36)
MCHC RBC-ENTMCNC: 34 GM/DL — SIGNIFICANT CHANGE UP (ref 32–36)
MCHC RBC-ENTMCNC: 34.2 GM/DL — SIGNIFICANT CHANGE UP (ref 32–36)
MCV RBC AUTO: 89.8 FL — SIGNIFICANT CHANGE UP (ref 80–100)
MCV RBC AUTO: 90.2 FL — SIGNIFICANT CHANGE UP (ref 80–100)
MCV RBC AUTO: 91.2 FL — SIGNIFICANT CHANGE UP (ref 80–100)
NRBC # BLD: 0 /100 WBCS — SIGNIFICANT CHANGE UP (ref 0–0)
PHOSPHATE SERPL-MCNC: 2.1 MG/DL — LOW (ref 2.5–4.5)
PHOSPHATE SERPL-MCNC: 3 MG/DL — SIGNIFICANT CHANGE UP (ref 2.5–4.5)
PHOSPHATE SERPL-MCNC: 3.2 MG/DL — SIGNIFICANT CHANGE UP (ref 2.5–4.5)
PLATELET # BLD AUTO: 119 K/UL — LOW (ref 150–400)
PLATELET # BLD AUTO: 133 K/UL — LOW (ref 150–400)
PLATELET # BLD AUTO: 133 K/UL — LOW (ref 150–400)
POTASSIUM SERPL-MCNC: 3.7 MMOL/L — SIGNIFICANT CHANGE UP (ref 3.5–5.3)
POTASSIUM SERPL-MCNC: 4 MMOL/L — SIGNIFICANT CHANGE UP (ref 3.5–5.3)
POTASSIUM SERPL-MCNC: 4.4 MMOL/L — SIGNIFICANT CHANGE UP (ref 3.5–5.3)
POTASSIUM SERPL-SCNC: 3.7 MMOL/L — SIGNIFICANT CHANGE UP (ref 3.5–5.3)
POTASSIUM SERPL-SCNC: 4 MMOL/L — SIGNIFICANT CHANGE UP (ref 3.5–5.3)
POTASSIUM SERPL-SCNC: 4.4 MMOL/L — SIGNIFICANT CHANGE UP (ref 3.5–5.3)
PROT SERPL-MCNC: 5.6 G/DL — LOW (ref 6–8.3)
PROT SERPL-MCNC: 5.8 G/DL — LOW (ref 6–8.3)
PROT SERPL-MCNC: 5.9 G/DL — LOW (ref 6–8.3)
PROTHROM AB SERPL-ACNC: 15.1 SEC — HIGH (ref 10–12.9)
PROTHROM AB SERPL-ACNC: 15.9 SEC — HIGH (ref 10–12.9)
PROTHROM AB SERPL-ACNC: 16.1 SEC — HIGH (ref 10–12.9)
RBC # BLD: 2.46 M/UL — LOW (ref 4.2–5.8)
RBC # BLD: 2.62 M/UL — LOW (ref 4.2–5.8)
RBC # BLD: 2.95 M/UL — LOW (ref 4.2–5.8)
RBC # FLD: 15.2 % — HIGH (ref 10.3–14.5)
SODIUM SERPL-SCNC: 128 MMOL/L — LOW (ref 135–145)
SODIUM SERPL-SCNC: 129 MMOL/L — LOW (ref 135–145)
SODIUM SERPL-SCNC: 130 MMOL/L — LOW (ref 135–145)
WBC # BLD: 18.37 K/UL — HIGH (ref 3.8–10.5)
WBC # BLD: 20.35 K/UL — HIGH (ref 3.8–10.5)
WBC # BLD: 21.12 K/UL — HIGH (ref 3.8–10.5)
WBC # FLD AUTO: 18.37 K/UL — HIGH (ref 3.8–10.5)
WBC # FLD AUTO: 20.35 K/UL — HIGH (ref 3.8–10.5)
WBC # FLD AUTO: 21.12 K/UL — HIGH (ref 3.8–10.5)

## 2019-10-11 PROCEDURE — 99292 CRITICAL CARE ADDL 30 MIN: CPT

## 2019-10-11 PROCEDURE — 71045 X-RAY EXAM CHEST 1 VIEW: CPT | Mod: 26,77

## 2019-10-11 PROCEDURE — 32551 INSERTION OF CHEST TUBE: CPT

## 2019-10-11 PROCEDURE — 99291 CRITICAL CARE FIRST HOUR: CPT

## 2019-10-11 PROCEDURE — 71045 X-RAY EXAM CHEST 1 VIEW: CPT | Mod: 26,76

## 2019-10-11 RX ORDER — POLYETHYLENE GLYCOL 3350 17 G/17G
17 POWDER, FOR SOLUTION ORAL DAILY
Refills: 0 | Status: DISCONTINUED | OUTPATIENT
Start: 2019-10-11 | End: 2019-10-17

## 2019-10-11 RX ORDER — FUROSEMIDE 40 MG
10 TABLET ORAL ONCE
Refills: 0 | Status: COMPLETED | OUTPATIENT
Start: 2019-10-11 | End: 2019-10-11

## 2019-10-11 RX ORDER — FENTANYL CITRATE 50 UG/ML
25 INJECTION INTRAVENOUS ONCE
Refills: 0 | Status: DISCONTINUED | OUTPATIENT
Start: 2019-10-11 | End: 2019-10-11

## 2019-10-11 RX ORDER — METOPROLOL TARTRATE 50 MG
25 TABLET ORAL EVERY 6 HOURS
Refills: 0 | Status: DISCONTINUED | OUTPATIENT
Start: 2019-10-11 | End: 2019-10-17

## 2019-10-11 RX ORDER — METOPROLOL TARTRATE 50 MG
12.5 TABLET ORAL ONCE
Refills: 0 | Status: COMPLETED | OUTPATIENT
Start: 2019-10-11 | End: 2019-10-11

## 2019-10-11 RX ORDER — CALCIUM GLUCONATE 100 MG/ML
2 VIAL (ML) INTRAVENOUS ONCE
Refills: 0 | Status: COMPLETED | OUTPATIENT
Start: 2019-10-11 | End: 2019-10-11

## 2019-10-11 RX ORDER — AMIODARONE HYDROCHLORIDE 400 MG/1
400 TABLET ORAL EVERY 8 HOURS
Refills: 0 | Status: COMPLETED | OUTPATIENT
Start: 2019-10-11 | End: 2019-10-15

## 2019-10-11 RX ORDER — MAGNESIUM SULFATE 500 MG/ML
2 VIAL (ML) INJECTION ONCE
Refills: 0 | Status: COMPLETED | OUTPATIENT
Start: 2019-10-11 | End: 2019-10-11

## 2019-10-11 RX ORDER — POTASSIUM CHLORIDE 20 MEQ
20 PACKET (EA) ORAL ONCE
Refills: 0 | Status: COMPLETED | OUTPATIENT
Start: 2019-10-11 | End: 2019-10-11

## 2019-10-11 RX ORDER — AMIODARONE HYDROCHLORIDE 400 MG/1
TABLET ORAL
Refills: 0 | Status: DISCONTINUED | OUTPATIENT
Start: 2019-10-11 | End: 2019-10-17

## 2019-10-11 RX ORDER — POTASSIUM CHLORIDE 20 MEQ
20 PACKET (EA) ORAL
Refills: 0 | Status: COMPLETED | OUTPATIENT
Start: 2019-10-11 | End: 2019-10-11

## 2019-10-11 RX ORDER — AMIODARONE HYDROCHLORIDE 400 MG/1
200 TABLET ORAL DAILY
Refills: 0 | Status: DISCONTINUED | OUTPATIENT
Start: 2019-10-15 | End: 2019-10-17

## 2019-10-11 RX ORDER — FUROSEMIDE 40 MG
20 TABLET ORAL ONCE
Refills: 0 | Status: COMPLETED | OUTPATIENT
Start: 2019-10-11 | End: 2019-10-11

## 2019-10-11 RX ORDER — FUROSEMIDE 40 MG
20 TABLET ORAL EVERY 12 HOURS
Refills: 0 | Status: DISCONTINUED | OUTPATIENT
Start: 2019-10-11 | End: 2019-10-15

## 2019-10-11 RX ORDER — MIDAZOLAM HYDROCHLORIDE 1 MG/ML
2 INJECTION, SOLUTION INTRAMUSCULAR; INTRAVENOUS ONCE
Refills: 0 | Status: DISCONTINUED | OUTPATIENT
Start: 2019-10-11 | End: 2019-10-11

## 2019-10-11 RX ORDER — POTASSIUM CHLORIDE 20 MEQ
20 PACKET (EA) ORAL DAILY
Refills: 0 | Status: DISCONTINUED | OUTPATIENT
Start: 2019-10-11 | End: 2019-10-15

## 2019-10-11 RX ADMIN — OXYCODONE AND ACETAMINOPHEN 1 TABLET(S): 5; 325 TABLET ORAL at 03:30

## 2019-10-11 RX ADMIN — CLOPIDOGREL BISULFATE 75 MILLIGRAM(S): 75 TABLET, FILM COATED ORAL at 11:31

## 2019-10-11 RX ADMIN — Medication 1 PACKET(S): at 05:16

## 2019-10-11 RX ADMIN — SENNA PLUS 1 TABLET(S): 8.6 TABLET ORAL at 21:46

## 2019-10-11 RX ADMIN — Medication 20 MILLIGRAM(S): at 04:44

## 2019-10-11 RX ADMIN — Medication 100 MILLIEQUIVALENT(S): at 10:16

## 2019-10-11 RX ADMIN — POLYETHYLENE GLYCOL 3350 17 GRAM(S): 17 POWDER, FOR SOLUTION ORAL at 11:49

## 2019-10-11 RX ADMIN — Medication 100 MILLIGRAM(S): at 14:53

## 2019-10-11 RX ADMIN — MIDAZOLAM HYDROCHLORIDE 2 MILLIGRAM(S): 1 INJECTION, SOLUTION INTRAMUSCULAR; INTRAVENOUS at 09:18

## 2019-10-11 RX ADMIN — Medication 81 MILLIGRAM(S): at 11:30

## 2019-10-11 RX ADMIN — Medication 50 GRAM(S): at 04:44

## 2019-10-11 RX ADMIN — FENTANYL CITRATE 25 MICROGRAM(S): 50 INJECTION INTRAVENOUS at 07:43

## 2019-10-11 RX ADMIN — Medication 300 MILLIGRAM(S): at 18:02

## 2019-10-11 RX ADMIN — Medication 50 GRAM(S): at 10:16

## 2019-10-11 RX ADMIN — Medication 1 PACKET(S): at 14:54

## 2019-10-11 RX ADMIN — CHLORHEXIDINE GLUCONATE 1 APPLICATION(S): 213 SOLUTION TOPICAL at 11:49

## 2019-10-11 RX ADMIN — Medication 12.5 MILLIGRAM(S): at 15:34

## 2019-10-11 RX ADMIN — ATORVASTATIN CALCIUM 80 MILLIGRAM(S): 80 TABLET, FILM COATED ORAL at 21:46

## 2019-10-11 RX ADMIN — FENTANYL CITRATE 25 MICROGRAM(S): 50 INJECTION INTRAVENOUS at 06:40

## 2019-10-11 RX ADMIN — Medication 100 MILLIEQUIVALENT(S): at 10:55

## 2019-10-11 RX ADMIN — HEPARIN SODIUM 5000 UNIT(S): 5000 INJECTION INTRAVENOUS; SUBCUTANEOUS at 14:53

## 2019-10-11 RX ADMIN — Medication 12.5 MILLIGRAM(S): at 11:30

## 2019-10-11 RX ADMIN — HEPARIN SODIUM 5000 UNIT(S): 5000 INJECTION INTRAVENOUS; SUBCUTANEOUS at 21:46

## 2019-10-11 RX ADMIN — AMIODARONE HYDROCHLORIDE 400 MILLIGRAM(S): 400 TABLET ORAL at 21:46

## 2019-10-11 RX ADMIN — Medication 20 MILLIGRAM(S): at 23:05

## 2019-10-11 RX ADMIN — Medication 200 GRAM(S): at 06:02

## 2019-10-11 RX ADMIN — TAMSULOSIN HYDROCHLORIDE 0.4 MILLIGRAM(S): 0.4 CAPSULE ORAL at 21:46

## 2019-10-11 RX ADMIN — PANTOPRAZOLE SODIUM 40 MILLIGRAM(S): 20 TABLET, DELAYED RELEASE ORAL at 07:22

## 2019-10-11 RX ADMIN — Medication 300 MILLIGRAM(S): at 05:17

## 2019-10-11 RX ADMIN — Medication 100 MILLIGRAM(S): at 05:16

## 2019-10-11 RX ADMIN — OXYCODONE AND ACETAMINOPHEN 1 TABLET(S): 5; 325 TABLET ORAL at 04:48

## 2019-10-11 RX ADMIN — AMIODARONE HYDROCHLORIDE 400 MILLIGRAM(S): 400 TABLET ORAL at 14:53

## 2019-10-11 RX ADMIN — Medication 25 MILLIGRAM(S): at 18:02

## 2019-10-11 RX ADMIN — Medication 10 MILLIGRAM(S): at 15:31

## 2019-10-11 RX ADMIN — HEPARIN SODIUM 5000 UNIT(S): 5000 INJECTION INTRAVENOUS; SUBCUTANEOUS at 05:17

## 2019-10-11 RX ADMIN — Medication 25 MILLIGRAM(S): at 23:05

## 2019-10-11 RX ADMIN — Medication 100 MILLIGRAM(S): at 21:46

## 2019-10-11 RX ADMIN — Medication 100 MILLIEQUIVALENT(S): at 15:33

## 2019-10-11 NOTE — PROCEDURE NOTE - NSPROCDETAILS_GEN_ALL_CORE
dressing applied/secured in place/sterile dressing applied/Seldinger technique/percutaneous
Aniket technique/dressing applied/secured in place/sterile dressing applied/percutaneous

## 2019-10-11 NOTE — PROGRESS NOTE ADULT - SUBJECTIVE AND OBJECTIVE BOX
INTERVAL HPI/OVERNIGHT EVENTS:    POD#3 CABG x 2; reexploration for bleeding - tamponade identified  EF 45%    78yo Male Hx tobacco use, HTN, chol, Renal artery stenosis - Left renal stent (5/2011), BPH, CAD/MI - stents w/thrombectomy; tandem assist ('10) presenting with CP    recent visit PCP - EKG was changed, Echo was abnormal - referred to cardiology 10/17.     EKG: sinus. TWI II, III, ST depressions In V4-V6.  CE (-)    Cath 10/7: LM normal, 99% mLAD, pLCx 100% with L-L collaterals, mRCA 100% with R-R collaterals.  IABP placed (Rt femoral)  admitted to ICU and CTS consulted    To OR 10/8  Urology consulted intraop for difficult clark placement - (+)BPH/large amount urine drained - 18 Coude placed     intraop:   No blood products given ; 2 L Crystalloid given   arrived to ICU on Shawn     RTOR - re-exploration for bleeding - Bleeding source identified:  3 U pRBC/2 FFP and 2L Crystalloid given    IABP removed in ICU and patient extubated 10/9 to HFNC (50/60) - baseline dopplerable pulses by report     patient awake/alert and interactive   drains removed 10/10 - small bilateral apical PTX reported post-removal     Right pigtail placed overnight after review of am xray - PTX enlarged; remains on HFNC  on amiodarone infusion     Hypoxemic on 6L NC - xray reviewed - inc SC emphysema on left and PTX inc - pigtail placed with re-expansion noted on repeat xray    placed without incident    PMHx includes but is not limited to:   Gout  HLD (hyperlipidemia)  HTN (hypertension)  Renal artery stenosis  CAD (coronary artery disease)  H/O renal artery stenosis: s/p stent  S/P coronary artery stent placement  BPH    ICU Vital Signs Last 24 Hrs  T(C): 37.3 (11 Oct 2019 05:01), Max: 37.3 (11 Oct 2019 05:01)  T(F): 99.1 (11 Oct 2019 05:01), Max: 99.1 (11 Oct 2019 05:01)  HR: 74 (11 Oct 2019 10:00) (62 - 88) sinus  ABP: 140/50 (11 Oct 2019 10:00) (112/40 - 166/58)  ABP(mean): 78 (11 Oct 2019 10:00) (60 - 92)  SpO2: 94% (11 Oct 2019 10:00) (88% - 96%) HFNC 50/60    Qtts:     I&O's Summary    10 Oct 2019 07:01  -  11 Oct 2019 07:00  --------------------------------------------------------  IN: 2362.9 mL / OUT: 2155 mL / NET: 207.9 mL    11 Oct 2019 07:01  -  11 Oct 2019 10:43  --------------------------------------------------------  IN: 26.7 mL / OUT: 250 mL / NET: -223.3 mL            Physical Exam    Heart  Lungs  Abd  Ext  Chest  Neuro  Skin    LABS:                        7.6    18.37 )-----------( 119      ( 11 Oct 2019 09:23 )             22.2     10-11    130<L>  |  95<L>  |  21  ----------------------------<  135<H>  3.7   |  24  |  1.02    Ca    8.9      11 Oct 2019 09:23  Phos  3.0     10-11  Mg     2.0     10-11    TPro  5.6<L>  /  Alb  2.9<L>  /  TBili  1.5<H>  /  DBili  x   /  AST  49<H>  /  ALT  13  /  AlkPhos  61  10-11    PT/INR - ( 11 Oct 2019 09:23 )   PT: 15.9 sec;   INR: 1.39          PTT - ( 11 Oct 2019 09:23 )  PTT:28.2 sec    ABG - ( 11 Oct 2019 09:24 )  pH, Arterial: 7.52  pH, Blood: x     /  pCO2: 31    /  pO2: 79    / HCO3: 24    / Base Excess: 1.7   /  SaO2: 96                  RADIOLOGY & ADDITIONAL STUDIES:    I have spent/provided stated minutes of critical care time to this patient: INTERVAL HPI/OVERNIGHT EVENTS:    POD#3 CABG x 2; reexploration for bleeding - tamponade identified  EF 45%    78yo Male Hx tobacco use, HTN, chol, Renal artery stenosis - Left renal stent (5/2011), BPH, CAD/MI - stents w/thrombectomy; tandem assist ('10) presenting with CP    recent visit PCP - EKG was changed, Echo was abnormal - referred to cardiology 10/17.     EKG: sinus. TWI II, III, ST depressions In V4-V6.  CE (-)    Cath 10/7: LM normal, 99% mLAD, pLCx 100% with L-L collaterals, mRCA 100% with R-R collaterals.  IABP placed (Rt femoral)  admitted to ICU and CTS consulted    To OR 10/8  Urology consulted intraop for difficult clark placement - (+)BPH/large amount urine drained - 18 Coude placed     intraop:   No blood products given ; 2 L Crystalloid given   arrived to ICU on Shawn     RTOR - re-exploration for bleeding - Bleeding source identified:  3 U pRBC/2 FFP and 2L Crystalloid given    IABP removed in ICU and patient extubated 10/9 to HFNC (50/60) - baseline dopplerable pulses by report     patient awake/alert and interactive   drains removed 10/10 - small bilateral apical PTX reported post-removal     Right pigtail placed overnight after review of am xray - PTX enlarged; remains on HFNC  on amiodarone infusion     Hypoxemic on 6L NC - xray reviewed - inc SC emphysema on left and PTX inc - pigtail placed with re-expansion noted on repeat xray    placed without incident. patient reports breathing "better" - non-labored at this time  appetite improving per patient   no other complaints when asked directly    PMHx includes but is not limited to:   Gout  HLD (hyperlipidemia)  HTN (hypertension)  Renal artery stenosis  CAD (coronary artery disease)  H/O renal artery stenosis: s/p stent  S/P coronary artery stent placement  BPH    ICU Vital Signs Last 24 Hrs  T(C): 37.3 (11 Oct 2019 05:01), Max: 37.3 (11 Oct 2019 05:01)  T(F): 99.1 (11 Oct 2019 05:01), Max: 99.1 (11 Oct 2019 05:01)  HR: 74 (11 Oct 2019 10:00) (62 - 88) sinus  ABP: 140/50 (11 Oct 2019 10:00) (112/40 - 166/58)  ABP(mean): 78 (11 Oct 2019 10:00) (60 - 92)  SpO2: 94% (11 Oct 2019 10:00) (88% - 96%) HFNC 50/60    Qtts: None    I&O's Summary    10 Oct 2019 07:01  -  11 Oct 2019 07:00  --------------------------------------------------------  IN: 2362.9 mL / OUT: 2155 mL / NET: 207.9 mL    11 Oct 2019 07:01  -  11 Oct 2019 10:43  --------------------------------------------------------  IN: 26.7 mL / OUT: 250 mL / NET: -223.3 mL    Physical Exam    Heart - regular _ no rub/gallop  Lungs- BS appreciated bilaterally -no rhonchi/wheeze  Abd - (+)BS soft NTND (-)r/r/g  Ext - warm to touch; trace edema extremities bilaterally  Chest - op bandage in place - to be taken down ; no palpable crepitus appreciated  Neuro - alert/oriented and interactive - nonfocal   Skin - no rash     LABS:                        7.6    18.37 )-----------( 119      ( 11 Oct 2019 09:23 )             22.2     10-11    130<L>  |  95<L>  |  21  ----------------------------<  135<H>  3.7   |  24  |  1.02    Ca    8.9      11 Oct 2019 09:23  Phos  3.0     10-11  Mg     2.0     10-11    TPro  5.6<L>  /  Alb  2.9<L>  /  TBili  1.5<H>  /  DBili  x   /  AST  49<H>  /  ALT  13  /  AlkPhos  61  10-11    PT/INR - ( 11 Oct 2019 09:23 )   PT: 15.9 sec;   INR: 1.39     PTT - ( 11 Oct 2019 09:23 )  PTT:28.2 sec    ABG - ( 11 Oct 2019 09:24 ) 7.52/31/79/96    RADIOLOGY & ADDITIONAL STUDIES: reviewed    Patient with multiple medical problems including known CAD now POD#3 CABG with RTOR for bleeding in short interval - stasis achieved and now extubated/off pressors    1. CV   hemodynamically stable  sinus rhythm  LA normalized 1.2  ASA/Plavix/statin  completed periop Abx prophylaxis - tolerated cefazolin despite listed "PCN allergy"  amiodarone infusion transitioned to ongoing po load   Metoprolol 12.5 q6h with hold parameters - titrate as clinical scenario allows    2. Pulm   extubated 10/10 to HFNC - prior trials titrate to NC unsucessful - Sa02 drops to mid to high 80's  pigtails placed for progressive PTX - chest tubes removed 10/10 with documented re-expansion   titrate supplemental oxygen down as clinical scenario allows  ambulation with staff assist and incentive spirometry    3. Renal/Urology  Hx BPH -   intraop Urology consult for coude placement  restarted on Flomax  urology to be contacted prior to clark removal   monitor UO/Lytes and Cr - 01.02    4. Endocrine  maintain glycemic control   ISS; serum 135   HgA1c 4.9    pain management  bowel regimen - no BM since pre-op (+)flatus - miralax ordered qd - to be given now    DVT and GI prophylaxis    d/w patient & family present in room/staff and CTS    I have spent/provided stated minutes of critical care time to this patient: 90min

## 2019-10-11 NOTE — PROCEDURE NOTE - NSSITEPREP_SKIN_A_CORE
Adherence to aseptic technique: hand hygiene prior to donning barriers (gown, gloves), don cap and mask, sterile drape over patient/chlorhexidine
Adherence to aseptic technique: hand hygiene prior to donning barriers (gown, gloves), don cap and mask, sterile drape over patient/chlorhexidine

## 2019-10-12 LAB
ALBUMIN SERPL ELPH-MCNC: 3.1 G/DL — LOW (ref 3.3–5)
ALBUMIN SERPL ELPH-MCNC: 3.4 G/DL — SIGNIFICANT CHANGE UP (ref 3.3–5)
ALBUMIN SERPL ELPH-MCNC: 3.4 G/DL — SIGNIFICANT CHANGE UP (ref 3.3–5)
ALP SERPL-CCNC: 63 U/L — SIGNIFICANT CHANGE UP (ref 40–120)
ALP SERPL-CCNC: 75 U/L — SIGNIFICANT CHANGE UP (ref 40–120)
ALP SERPL-CCNC: 89 U/L — SIGNIFICANT CHANGE UP (ref 40–120)
ALT FLD-CCNC: 15 U/L — SIGNIFICANT CHANGE UP (ref 10–45)
ALT FLD-CCNC: 19 U/L — SIGNIFICANT CHANGE UP (ref 10–45)
ALT FLD-CCNC: 24 U/L — SIGNIFICANT CHANGE UP (ref 10–45)
ANION GAP SERPL CALC-SCNC: 10 MMOL/L — SIGNIFICANT CHANGE UP (ref 5–17)
ANION GAP SERPL CALC-SCNC: 12 MMOL/L — SIGNIFICANT CHANGE UP (ref 5–17)
ANION GAP SERPL CALC-SCNC: 8 MMOL/L — SIGNIFICANT CHANGE UP (ref 5–17)
APTT BLD: 28.9 SEC — SIGNIFICANT CHANGE UP (ref 27.5–36.3)
APTT BLD: 50.3 SEC — HIGH (ref 27.5–36.3)
APTT BLD: 54.7 SEC — HIGH (ref 27.5–36.3)
AST SERPL-CCNC: 38 U/L — SIGNIFICANT CHANGE UP (ref 10–40)
AST SERPL-CCNC: 43 U/L — HIGH (ref 10–40)
AST SERPL-CCNC: 50 U/L — HIGH (ref 10–40)
BILIRUB SERPL-MCNC: 1.3 MG/DL — HIGH (ref 0.2–1.2)
BILIRUB SERPL-MCNC: 1.3 MG/DL — HIGH (ref 0.2–1.2)
BILIRUB SERPL-MCNC: 1.5 MG/DL — HIGH (ref 0.2–1.2)
BUN SERPL-MCNC: 24 MG/DL — HIGH (ref 7–23)
BUN SERPL-MCNC: 24 MG/DL — HIGH (ref 7–23)
BUN SERPL-MCNC: 25 MG/DL — HIGH (ref 7–23)
CALCIUM SERPL-MCNC: 8.1 MG/DL — LOW (ref 8.4–10.5)
CALCIUM SERPL-MCNC: 8.5 MG/DL — SIGNIFICANT CHANGE UP (ref 8.4–10.5)
CALCIUM SERPL-MCNC: 8.8 MG/DL — SIGNIFICANT CHANGE UP (ref 8.4–10.5)
CHLORIDE SERPL-SCNC: 93 MMOL/L — LOW (ref 96–108)
CHLORIDE SERPL-SCNC: 94 MMOL/L — LOW (ref 96–108)
CHLORIDE SERPL-SCNC: 95 MMOL/L — LOW (ref 96–108)
CO2 SERPL-SCNC: 25 MMOL/L — SIGNIFICANT CHANGE UP (ref 22–31)
CREAT SERPL-MCNC: 1.09 MG/DL — SIGNIFICANT CHANGE UP (ref 0.5–1.3)
CREAT SERPL-MCNC: 1.15 MG/DL — SIGNIFICANT CHANGE UP (ref 0.5–1.3)
CREAT SERPL-MCNC: 1.18 MG/DL — SIGNIFICANT CHANGE UP (ref 0.5–1.3)
GAS PNL BLDA: SIGNIFICANT CHANGE UP
GLUCOSE SERPL-MCNC: 107 MG/DL — HIGH (ref 70–99)
GLUCOSE SERPL-MCNC: 122 MG/DL — HIGH (ref 70–99)
GLUCOSE SERPL-MCNC: 144 MG/DL — HIGH (ref 70–99)
HCT VFR BLD CALC: 23.5 % — LOW (ref 39–50)
HCT VFR BLD CALC: 25.8 % — LOW (ref 39–50)
HCT VFR BLD CALC: 26.3 % — LOW (ref 39–50)
HGB BLD-MCNC: 8.1 G/DL — LOW (ref 13–17)
HGB BLD-MCNC: 8.8 G/DL — LOW (ref 13–17)
HGB BLD-MCNC: 9 G/DL — LOW (ref 13–17)
INR BLD: 1.24 — HIGH (ref 0.88–1.16)
INR BLD: 1.25 — HIGH (ref 0.88–1.16)
LACTATE SERPL-SCNC: 1 MMOL/L — SIGNIFICANT CHANGE UP (ref 0.5–2)
LACTATE SERPL-SCNC: 1.1 MMOL/L — SIGNIFICANT CHANGE UP (ref 0.5–2)
MAGNESIUM SERPL-MCNC: 1.7 MG/DL — SIGNIFICANT CHANGE UP (ref 1.6–2.6)
MAGNESIUM SERPL-MCNC: 1.8 MG/DL — SIGNIFICANT CHANGE UP (ref 1.6–2.6)
MAGNESIUM SERPL-MCNC: 2.2 MG/DL — SIGNIFICANT CHANGE UP (ref 1.6–2.6)
MCHC RBC-ENTMCNC: 30.4 PG — SIGNIFICANT CHANGE UP (ref 27–34)
MCHC RBC-ENTMCNC: 30.8 PG — SIGNIFICANT CHANGE UP (ref 27–34)
MCHC RBC-ENTMCNC: 31.6 PG — SIGNIFICANT CHANGE UP (ref 27–34)
MCHC RBC-ENTMCNC: 33.5 GM/DL — SIGNIFICANT CHANGE UP (ref 32–36)
MCHC RBC-ENTMCNC: 34.5 GM/DL — SIGNIFICANT CHANGE UP (ref 32–36)
MCHC RBC-ENTMCNC: 34.9 GM/DL — SIGNIFICANT CHANGE UP (ref 32–36)
MCV RBC AUTO: 89.4 FL — SIGNIFICANT CHANGE UP (ref 80–100)
MCV RBC AUTO: 90.5 FL — SIGNIFICANT CHANGE UP (ref 80–100)
MCV RBC AUTO: 91 FL — SIGNIFICANT CHANGE UP (ref 80–100)
NRBC # BLD: 0 /100 WBCS — SIGNIFICANT CHANGE UP (ref 0–0)
PHOSPHATE SERPL-MCNC: 2.9 MG/DL — SIGNIFICANT CHANGE UP (ref 2.5–4.5)
PHOSPHATE SERPL-MCNC: 3.1 MG/DL — SIGNIFICANT CHANGE UP (ref 2.5–4.5)
PHOSPHATE SERPL-MCNC: 3.2 MG/DL — SIGNIFICANT CHANGE UP (ref 2.5–4.5)
PLATELET # BLD AUTO: 129 K/UL — LOW (ref 150–400)
PLATELET # BLD AUTO: 146 K/UL — LOW (ref 150–400)
PLATELET # BLD AUTO: 152 K/UL — SIGNIFICANT CHANGE UP (ref 150–400)
POTASSIUM SERPL-MCNC: 3.8 MMOL/L — SIGNIFICANT CHANGE UP (ref 3.5–5.3)
POTASSIUM SERPL-MCNC: 3.9 MMOL/L — SIGNIFICANT CHANGE UP (ref 3.5–5.3)
POTASSIUM SERPL-MCNC: 4.2 MMOL/L — SIGNIFICANT CHANGE UP (ref 3.5–5.3)
POTASSIUM SERPL-SCNC: 3.8 MMOL/L — SIGNIFICANT CHANGE UP (ref 3.5–5.3)
POTASSIUM SERPL-SCNC: 3.9 MMOL/L — SIGNIFICANT CHANGE UP (ref 3.5–5.3)
POTASSIUM SERPL-SCNC: 4.2 MMOL/L — SIGNIFICANT CHANGE UP (ref 3.5–5.3)
PREALB SERPL-MCNC: 8 MG/DL — LOW (ref 20–40)
PROT SERPL-MCNC: 5.3 G/DL — LOW (ref 6–8.3)
PROT SERPL-MCNC: 6.1 G/DL — SIGNIFICANT CHANGE UP (ref 6–8.3)
PROT SERPL-MCNC: 6.2 G/DL — SIGNIFICANT CHANGE UP (ref 6–8.3)
PROTHROM AB SERPL-ACNC: 14.1 SEC — HIGH (ref 10–12.9)
PROTHROM AB SERPL-ACNC: 14.2 SEC — HIGH (ref 10–12.9)
RBC # BLD: 2.63 M/UL — LOW (ref 4.2–5.8)
RBC # BLD: 2.85 M/UL — LOW (ref 4.2–5.8)
RBC # BLD: 2.89 M/UL — LOW (ref 4.2–5.8)
RBC # FLD: 15.5 % — HIGH (ref 10.3–14.5)
RBC # FLD: 15.5 % — HIGH (ref 10.3–14.5)
RBC # FLD: 15.6 % — HIGH (ref 10.3–14.5)
SODIUM SERPL-SCNC: 127 MMOL/L — LOW (ref 135–145)
SODIUM SERPL-SCNC: 128 MMOL/L — LOW (ref 135–145)
SODIUM SERPL-SCNC: 132 MMOL/L — LOW (ref 135–145)
WBC # BLD: 15.7 K/UL — HIGH (ref 3.8–10.5)
WBC # BLD: 16.58 K/UL — HIGH (ref 3.8–10.5)
WBC # BLD: 17.32 K/UL — HIGH (ref 3.8–10.5)
WBC # FLD AUTO: 15.7 K/UL — HIGH (ref 3.8–10.5)
WBC # FLD AUTO: 16.58 K/UL — HIGH (ref 3.8–10.5)
WBC # FLD AUTO: 17.32 K/UL — HIGH (ref 3.8–10.5)

## 2019-10-12 PROCEDURE — 71045 X-RAY EXAM CHEST 1 VIEW: CPT | Mod: 26,76

## 2019-10-12 PROCEDURE — 99291 CRITICAL CARE FIRST HOUR: CPT

## 2019-10-12 RX ORDER — BUDESONIDE, MICRONIZED 100 %
0.5 POWDER (GRAM) MISCELLANEOUS EVERY 12 HOURS
Refills: 0 | Status: DISCONTINUED | OUTPATIENT
Start: 2019-10-12 | End: 2019-10-17

## 2019-10-12 RX ORDER — ALBUMIN HUMAN 25 %
250 VIAL (ML) INTRAVENOUS
Refills: 0 | Status: COMPLETED | OUTPATIENT
Start: 2019-10-12 | End: 2019-10-13

## 2019-10-12 RX ORDER — LEVALBUTEROL 1.25 MG/.5ML
0.63 SOLUTION, CONCENTRATE RESPIRATORY (INHALATION) EVERY 6 HOURS
Refills: 0 | Status: DISCONTINUED | OUTPATIENT
Start: 2019-10-12 | End: 2019-10-17

## 2019-10-12 RX ORDER — POTASSIUM CHLORIDE 20 MEQ
20 PACKET (EA) ORAL ONCE
Refills: 0 | Status: COMPLETED | OUTPATIENT
Start: 2019-10-12 | End: 2019-10-12

## 2019-10-12 RX ORDER — CALCIUM GLUCONATE 100 MG/ML
2 VIAL (ML) INTRAVENOUS ONCE
Refills: 0 | Status: COMPLETED | OUTPATIENT
Start: 2019-10-12 | End: 2019-10-12

## 2019-10-12 RX ORDER — MAGNESIUM SULFATE 500 MG/ML
2 VIAL (ML) INJECTION ONCE
Refills: 0 | Status: COMPLETED | OUTPATIENT
Start: 2019-10-12 | End: 2019-10-12

## 2019-10-12 RX ADMIN — Medication 100 MILLIGRAM(S): at 06:45

## 2019-10-12 RX ADMIN — Medication 100 MILLIGRAM(S): at 21:16

## 2019-10-12 RX ADMIN — Medication 50 GRAM(S): at 10:24

## 2019-10-12 RX ADMIN — Medication 25 MILLIGRAM(S): at 11:27

## 2019-10-12 RX ADMIN — AMIODARONE HYDROCHLORIDE 400 MILLIGRAM(S): 400 TABLET ORAL at 13:12

## 2019-10-12 RX ADMIN — Medication 200 GRAM(S): at 16:13

## 2019-10-12 RX ADMIN — Medication 20 MILLIGRAM(S): at 06:45

## 2019-10-12 RX ADMIN — Medication 20 MILLIGRAM(S): at 17:11

## 2019-10-12 RX ADMIN — Medication 25 MILLIGRAM(S): at 17:11

## 2019-10-12 RX ADMIN — Medication 300 MILLIGRAM(S): at 17:11

## 2019-10-12 RX ADMIN — Medication 200 GRAM(S): at 11:27

## 2019-10-12 RX ADMIN — SENNA PLUS 1 TABLET(S): 8.6 TABLET ORAL at 21:15

## 2019-10-12 RX ADMIN — ATORVASTATIN CALCIUM 80 MILLIGRAM(S): 80 TABLET, FILM COATED ORAL at 21:15

## 2019-10-12 RX ADMIN — Medication 20 MILLIEQUIVALENT(S): at 11:27

## 2019-10-12 RX ADMIN — OXYCODONE AND ACETAMINOPHEN 2 TABLET(S): 5; 325 TABLET ORAL at 23:02

## 2019-10-12 RX ADMIN — Medication 100 MILLIEQUIVALENT(S): at 07:22

## 2019-10-12 RX ADMIN — Medication 100 MILLIGRAM(S): at 13:12

## 2019-10-12 RX ADMIN — HEPARIN SODIUM 5000 UNIT(S): 5000 INJECTION INTRAVENOUS; SUBCUTANEOUS at 06:45

## 2019-10-12 RX ADMIN — HEPARIN SODIUM 5000 UNIT(S): 5000 INJECTION INTRAVENOUS; SUBCUTANEOUS at 21:16

## 2019-10-12 RX ADMIN — PANTOPRAZOLE SODIUM 40 MILLIGRAM(S): 20 TABLET, DELAYED RELEASE ORAL at 06:46

## 2019-10-12 RX ADMIN — Medication 300 MILLIGRAM(S): at 06:46

## 2019-10-12 RX ADMIN — Medication 81 MILLIGRAM(S): at 11:33

## 2019-10-12 RX ADMIN — Medication 125 MILLILITER(S): at 23:45

## 2019-10-12 RX ADMIN — CLOPIDOGREL BISULFATE 75 MILLIGRAM(S): 75 TABLET, FILM COATED ORAL at 11:27

## 2019-10-12 RX ADMIN — AMIODARONE HYDROCHLORIDE 400 MILLIGRAM(S): 400 TABLET ORAL at 06:46

## 2019-10-12 RX ADMIN — HEPARIN SODIUM 5000 UNIT(S): 5000 INJECTION INTRAVENOUS; SUBCUTANEOUS at 13:12

## 2019-10-12 RX ADMIN — Medication 25 MILLIGRAM(S): at 23:02

## 2019-10-12 RX ADMIN — TAMSULOSIN HYDROCHLORIDE 0.4 MILLIGRAM(S): 0.4 CAPSULE ORAL at 21:15

## 2019-10-12 RX ADMIN — Medication 25 MILLIGRAM(S): at 07:00

## 2019-10-12 RX ADMIN — CHLORHEXIDINE GLUCONATE 1 APPLICATION(S): 213 SOLUTION TOPICAL at 11:34

## 2019-10-12 RX ADMIN — AMIODARONE HYDROCHLORIDE 400 MILLIGRAM(S): 400 TABLET ORAL at 21:15

## 2019-10-12 RX ADMIN — Medication 100 MILLIEQUIVALENT(S): at 11:52

## 2019-10-12 RX ADMIN — Medication 0.5 MILLIGRAM(S): at 18:15

## 2019-10-12 NOTE — PROGRESS NOTE ADULT - SUBJECTIVE AND OBJECTIVE BOX
INTERVAL HPI/OVERNIGHT EVENTS:    POD#4 CABG x 2; reexploration for bleeding - tamponade identified  EF 45%    78yo Male Hx tobacco use, HTN, chol, Renal artery stenosis - Left renal stent (5/2011), BPH, CAD/MI - stents w/thrombectomy; tandem assist ('10) presenting with CP    recent visit PCP - EKG was changed, Echo was abnormal - referred to cardiology 10/17.     EKG: sinus. TWI II, III, ST depressions In V4-V6.  CE (-)    Cath 10/7: LM normal, 99% mLAD, pLCx 100% with L-L collaterals, mRCA 100% with R-R collaterals.  IABP placed (Rt femoral)  admitted to ICU and CTS consulted    To OR 10/8  Urology consulted intraop for difficult clark placement - (+)BPH/large amount urine drained - 18 Coude placed     intraop:   No blood products given ; 2 L Crystalloid given   arrived to ICU on Shawn     RTOR - re-exploration for bleeding - Bleeding source identified:  3 U pRBC/2 FFP and 2L Crystalloid given    IABP removed in ICU and patient extubated 10/9 to HFNC (50/60) - baseline dopplerable pulses by report     patient awake/alert and interactive   drains removed 10/10 - small bilateral apical PTX reported post-removal     Right pigtail placed overnight after review of am xray - PTX enlarged; remains on HFNC  on amiodarone infusion     Hypoxemic on 6L NC - xray reviewed - inc SC emphysema on left and PTX inc - pigtail placed with re-expansion noted on repeat xray    placed without incident. patient reports breathing "better" - non-labored at this time  appetite improving per patient   additionally right pigtail placed for progression of apical PTX 10/11 - on HFNC overnight  transitioned to nasal cannula this am     Up and ambulating with staff - no acute events reported overnight     PMHx includes but is not limited to:   Gout  HLD (hyperlipidemia)  HTN (hypertension)  Renal artery stenosis  CAD (coronary artery disease)  H/O renal artery stenosis: s/p stent  S/P coronary artery stent placement  BPH    ICU Vital Signs Last 24 Hrs  T(C): 36.9 (12 Oct 2019 09:36), Max: 37.8 (12 Oct 2019 05:01)  T(F): 98.4 (12 Oct 2019 09:36), Max: 100 (12 Oct 2019 05:01)  HR: 74 (12 Oct 2019 12:00) (60 - 74) sinus  BP: 121/57 (12 Oct 2019 11:00) (121/57 - 121/57)  BP(mean): 94 (12 Oct 2019 11:00) (94 - 94)  ABP: 172/56 (12 Oct 2019 12:00) (102/40 - 172/56)  ABP(mean): 92 (12 Oct 2019 12:00) (58 - 92)  SpO2: 96% (12 Oct 2019 12:00) (89% - 99%) 6L NC    Qtts: None    I&O's Summary    11 Oct 2019 07:01  -  12 Oct 2019 07:00  --------------------------------------------------------  IN: 1691.7 mL / OUT: 2560 mL / NET: -868.3 mL    12 Oct 2019 07:01  -  12 Oct 2019 12:29  --------------------------------------------------------  IN: 320 mL / OUT: 851 mL / NET: -531 mL    Physical Exam    Heart  Lungs  Abd  Ext  Chest  Neuro  Skin    LABS:                        9.0    17.32 )-----------( 146      ( 12 Oct 2019 09:13 )             25.8     10-12    128<L>  |  93<L>  |  25<H>  ----------------------------<  144<H>  3.9   |  25  |  1.15    Ca    8.5      12 Oct 2019 09:13  Phos  2.9     10-12  Mg     1.7     10-12    TPro  6.1  /  Alb  3.4  /  TBili  1.5<H>  /  DBili  x   /  AST  43<H>  /  ALT  19  /  AlkPhos  75  10-12    PT/INR - ( 12 Oct 2019 09:13 )   PT: 14.2 sec;   INR: 1.25     PTT - ( 12 Oct 2019 09:13 )  PTT:54.7 sec    ABG - ( 12 Oct 2019 09:19 ) 7.48/32/90/97    RADIOLOGY & ADDITIONAL STUDIES: reviewed    Patient with multiple medical problems including known CAD now POD#4 CABG with RTOR for bleeding in short interval - stasis achieved and now extubated/off pressors    1. CV   hemodynamically stable  sinus rhythm  LA normalized 1.2  ASA/Plavix/statin  completed periop Abx prophylaxis - tolerated cefazolin despite listed "PCN allergy"  amiodarone infusion transitioned to ongoing po load   Metoprolol 12.5 q6h with hold parameters - titrate as clinical scenario allows    2. Pulm   extubated 10/10 to HFNC - prior trials titrate to NC unsucessful - Sa02 drops to mid to high 80's  pigtails placed for progressive PTX - chest tubes removed 10/10 with documented re-expansion   titrate supplemental oxygen down as clinical scenario allows  ambulation with staff assist and incentive spirometry    3. Renal/Urology  Hx BPH -   intraop Urology consult for coude placement  restarted on Flomax  urology to be contacted prior to clark removal   monitor UO/Lytes and Cr - 01.02    4. Endocrine  maintain glycemic control   ISS; serum 135   HgA1c 4.9    pain management  bowel regimen - no BM since pre-op (+)flatus - miralax ordered qd - to be given now    DVT and GI prophylaxis    d/w patient & family present in room/staff and CTS    I have spent/provided stated minutes of critical care time to this patient: INTERVAL HPI/OVERNIGHT EVENTS:    POD#4 CABG x 2; reexploration for bleeding - tamponade identified  EF 45%    78yo Male Hx tobacco use, HTN, chol, Renal artery stenosis - Left renal stent (5/2011), BPH, CAD/MI - stents w/thrombectomy; tandem assist ('10) presenting with CP    recent visit PCP - EKG was changed, Echo was abnormal - referred to cardiology 10/17.     EKG: sinus. TWI II, III, ST depressions In V4-V6.  CE (-)    Cath 10/7: LM normal, 99% mLAD, pLCx 100% with L-L collaterals, mRCA 100% with R-R collaterals.  IABP placed (Rt femoral)  admitted to ICU and CTS consulted    To OR 10/8  Urology consulted intraop for difficult clark placement - (+)BPH/large amount urine drained - 18 Coude placed     intraop:   No blood products given ; 2 L Crystalloid given   arrived to ICU on Shawn     RTOR - re-exploration for bleeding - Bleeding source identified:  3 U pRBC/2 FFP and 2L Crystalloid given    IABP removed in ICU and patient extubated 10/9 to HFNC (50/60) - baseline dopplerable pulses by report     patient awake/alert and interactive   drains removed 10/10 - small bilateral apical PTX reported post-removal     Right pigtail placed overnight after review of am xray - PTX enlarged; remains on HFNC  on amiodarone infusion     Hypoxemic on 6L NC - xray reviewed - inc SC emphysema on left and PTX inc - pigtail placed with re-expansion noted on repeat xray    placed without incident. patient reports breathing "better" - non-labored at this time  appetite improving per patient   additionally right pigtail placed for progression of apical PTX 10/11 - on HFNC overnight  transitioned to nasal cannula this am     Up and ambulating with staff - no acute events reported overnight     PMHx includes but is not limited to:   Gout  HLD (hyperlipidemia)  HTN (hypertension)  Renal artery stenosis  CAD (coronary artery disease)  H/O renal artery stenosis: s/p stent  S/P coronary artery stent placement  BPH    ICU Vital Signs Last 24 Hrs  T(C): 36.9 (12 Oct 2019 09:36), Max: 37.8 (12 Oct 2019 05:01)  T(F): 98.4 (12 Oct 2019 09:36), Max: 100 (12 Oct 2019 05:01)  HR: 74 (12 Oct 2019 12:00) (60 - 74) sinus  BP: 121/57 (12 Oct 2019 11:00) (121/57 - 121/57)  BP(mean): 94 (12 Oct 2019 11:00) (94 - 94)  ABP: 172/56 (12 Oct 2019 12:00) (102/40 - 172/56)  ABP(mean): 92 (12 Oct 2019 12:00) (58 - 92)  SpO2: 96% (12 Oct 2019 12:00) (89% - 99%) 6L NC    Qtts: None    I&O's Summary    11 Oct 2019 07:01  -  12 Oct 2019 07:00  --------------------------------------------------------  IN: 1691.7 mL / OUT: 2560 mL / NET: -868.3 mL    12 Oct 2019 07:01  -  12 Oct 2019 12:29  --------------------------------------------------------  IN: 320 mL / OUT: 851 mL / NET: -531 mL    Physical Exam    Heart - regular (-)rub/gallop  Lungs - improved air entry - no rhonchi/wheeze  Abd - (+)BS soft NTND (-)r/r/g  Ext - warm to touch; no cyanosis/clubbing  Chest - incision site clean dry   Neuro - alert/oriented & interactive; non-focal  Skin - no rash     LABS:                        9.0    17.32 )-----------( 146      ( 12 Oct 2019 09:13 )             25.8     10-12    128<L>  |  93<L>  |  25<H>  ----------------------------<  144<H>  3.9   |  25  |  1.15    Ca    8.5      12 Oct 2019 09:13  Phos  2.9     10-12  Mg     1.7     10-12    TPro  6.1  /  Alb  3.4  /  TBili  1.5<H>  /  DBili  x   /  AST  43<H>  /  ALT  19  /  AlkPhos  75  10-12    PT/INR - ( 12 Oct 2019 09:13 )   PT: 14.2 sec;   INR: 1.25     PTT - ( 12 Oct 2019 09:13 )  PTT:54.7 sec    ABG - ( 12 Oct 2019 09:19 ) 7.48/32/90/97    RADIOLOGY & ADDITIONAL STUDIES: reviewed    Patient with multiple medical problems including known CAD now POD#4 CABG with RTOR for bleeding in short interval - stasis achieved and now extubated/off pressors    1. CV   hemodynamically stable  sinus rhythm  LA normalized 1.1  ASA/Plavix/statin  po amiodarone load   Metoprolol 25 q6h with hold parameters - titrate as clinical scenario allows  lasix 20mg IV BID    2. Pulm   extubated 10/10 to HFNC - now titrated to nasal cannula  pigtails placed for progressive PTX 10/11 - clamp trial planned prior to removal   titrate supplemental oxygen down as clinical scenario allows - now on 4L   ambulation with staff assist and incentive spirometry    3. Renal/Urology  Hx BPH -   intraop Urology consult for coude placement  restarted on Flomax  urology to be contacted prior to clark removal - called. recommend BM prior to consideration of removal   monitor UO/Lytes and Cr - 01.15  Na 128 - euvolemic on exam at this time    4. Endocrine  maintain glycemic control   ISS; serum 144  HgA1c 4.9    pain management  bowel regimen - no BM since pre-op (+)flatus - miralax ordered qd - patient feels he would like today to have BM prior to initiating suppository/enema etc    DVT and GI prophylaxis    d/w patient & family present in room/staff and CTS    I have spent/provided stated minutes of critical care time to this patient:

## 2019-10-13 LAB
-  AMOXICILLIN/CLAVULANIC ACID: SIGNIFICANT CHANGE UP
-  CLINDAMYCIN: SIGNIFICANT CHANGE UP
-  ERTAPENEM: SIGNIFICANT CHANGE UP
-  MEROPENEM: SIGNIFICANT CHANGE UP
-  METRONIDAZOLE: SIGNIFICANT CHANGE UP
ALBUMIN SERPL ELPH-MCNC: 3.2 G/DL — LOW (ref 3.3–5)
ALBUMIN SERPL ELPH-MCNC: 3.3 G/DL — SIGNIFICANT CHANGE UP (ref 3.3–5)
ALBUMIN SERPL ELPH-MCNC: 3.6 G/DL — SIGNIFICANT CHANGE UP (ref 3.3–5)
ALP SERPL-CCNC: 105 U/L — SIGNIFICANT CHANGE UP (ref 40–120)
ALP SERPL-CCNC: 145 U/L — HIGH (ref 40–120)
ALP SERPL-CCNC: 88 U/L — SIGNIFICANT CHANGE UP (ref 40–120)
ALT FLD-CCNC: 19 U/L — SIGNIFICANT CHANGE UP (ref 10–45)
ALT FLD-CCNC: 23 U/L — SIGNIFICANT CHANGE UP (ref 10–45)
ALT FLD-CCNC: 31 U/L — SIGNIFICANT CHANGE UP (ref 10–45)
ANION GAP SERPL CALC-SCNC: 11 MMOL/L — SIGNIFICANT CHANGE UP (ref 5–17)
ANION GAP SERPL CALC-SCNC: 12 MMOL/L — SIGNIFICANT CHANGE UP (ref 5–17)
ANION GAP SERPL CALC-SCNC: 12 MMOL/L — SIGNIFICANT CHANGE UP (ref 5–17)
ANION GAP SERPL CALC-SCNC: 13 MMOL/L — SIGNIFICANT CHANGE UP (ref 5–17)
ANION GAP SERPL CALC-SCNC: 13 MMOL/L — SIGNIFICANT CHANGE UP (ref 5–17)
APTT BLD: 26.7 SEC — LOW (ref 27.5–36.3)
APTT BLD: 34.2 SEC — SIGNIFICANT CHANGE UP (ref 27.5–36.3)
AST SERPL-CCNC: 35 U/L — SIGNIFICANT CHANGE UP (ref 10–40)
AST SERPL-CCNC: 40 U/L — SIGNIFICANT CHANGE UP (ref 10–40)
AST SERPL-CCNC: 48 U/L — HIGH (ref 10–40)
BILIRUB SERPL-MCNC: 1.2 MG/DL — SIGNIFICANT CHANGE UP (ref 0.2–1.2)
BILIRUB SERPL-MCNC: 1.4 MG/DL — HIGH (ref 0.2–1.2)
BILIRUB SERPL-MCNC: 1.4 MG/DL — HIGH (ref 0.2–1.2)
BLD GP AB SCN SERPL QL: NEGATIVE — SIGNIFICANT CHANGE UP
BUN SERPL-MCNC: 26 MG/DL — HIGH (ref 7–23)
BUN SERPL-MCNC: 26 MG/DL — HIGH (ref 7–23)
BUN SERPL-MCNC: 27 MG/DL — HIGH (ref 7–23)
BUN SERPL-MCNC: 28 MG/DL — HIGH (ref 7–23)
BUN SERPL-MCNC: 29 MG/DL — HIGH (ref 7–23)
CALCIUM SERPL-MCNC: 8.4 MG/DL — SIGNIFICANT CHANGE UP (ref 8.4–10.5)
CALCIUM SERPL-MCNC: 8.4 MG/DL — SIGNIFICANT CHANGE UP (ref 8.4–10.5)
CALCIUM SERPL-MCNC: 8.6 MG/DL — SIGNIFICANT CHANGE UP (ref 8.4–10.5)
CALCIUM SERPL-MCNC: 9.4 MG/DL — SIGNIFICANT CHANGE UP (ref 8.4–10.5)
CALCIUM SERPL-MCNC: 9.6 MG/DL — SIGNIFICANT CHANGE UP (ref 8.4–10.5)
CHLORIDE SERPL-SCNC: 93 MMOL/L — LOW (ref 96–108)
CHLORIDE SERPL-SCNC: 95 MMOL/L — LOW (ref 96–108)
CO2 SERPL-SCNC: 23 MMOL/L — SIGNIFICANT CHANGE UP (ref 22–31)
CO2 SERPL-SCNC: 23 MMOL/L — SIGNIFICANT CHANGE UP (ref 22–31)
CO2 SERPL-SCNC: 24 MMOL/L — SIGNIFICANT CHANGE UP (ref 22–31)
CO2 SERPL-SCNC: 25 MMOL/L — SIGNIFICANT CHANGE UP (ref 22–31)
CO2 SERPL-SCNC: 26 MMOL/L — SIGNIFICANT CHANGE UP (ref 22–31)
CREAT SERPL-MCNC: 1.21 MG/DL — SIGNIFICANT CHANGE UP (ref 0.5–1.3)
CREAT SERPL-MCNC: 1.26 MG/DL — SIGNIFICANT CHANGE UP (ref 0.5–1.3)
CREAT SERPL-MCNC: 1.27 MG/DL — SIGNIFICANT CHANGE UP (ref 0.5–1.3)
CREAT SERPL-MCNC: 1.28 MG/DL — SIGNIFICANT CHANGE UP (ref 0.5–1.3)
CREAT SERPL-MCNC: 1.39 MG/DL — HIGH (ref 0.5–1.3)
CULTURE RESULTS: SIGNIFICANT CHANGE UP
GAS PNL BLDA: SIGNIFICANT CHANGE UP
GAS PNL BLDA: SIGNIFICANT CHANGE UP
GLUCOSE SERPL-MCNC: 108 MG/DL — HIGH (ref 70–99)
GLUCOSE SERPL-MCNC: 114 MG/DL — HIGH (ref 70–99)
GLUCOSE SERPL-MCNC: 114 MG/DL — HIGH (ref 70–99)
GLUCOSE SERPL-MCNC: 160 MG/DL — HIGH (ref 70–99)
GLUCOSE SERPL-MCNC: 97 MG/DL — SIGNIFICANT CHANGE UP (ref 70–99)
HCT VFR BLD CALC: 21.1 % — LOW (ref 39–50)
HCT VFR BLD CALC: 22.2 % — LOW (ref 39–50)
HCT VFR BLD CALC: 26.2 % — LOW (ref 39–50)
HCT VFR BLD CALC: 27.9 % — LOW (ref 39–50)
HGB BLD-MCNC: 7.3 G/DL — LOW (ref 13–17)
HGB BLD-MCNC: 7.3 G/DL — LOW (ref 13–17)
HGB BLD-MCNC: 8.9 G/DL — LOW (ref 13–17)
HGB BLD-MCNC: 9.7 G/DL — LOW (ref 13–17)
INR BLD: 1.25 — HIGH (ref 0.88–1.16)
INR BLD: 1.32 — HIGH (ref 0.88–1.16)
LACTATE SERPL-SCNC: 1 MMOL/L — SIGNIFICANT CHANGE UP (ref 0.5–2)
MAGNESIUM SERPL-MCNC: 1.7 MG/DL — SIGNIFICANT CHANGE UP (ref 1.6–2.6)
MAGNESIUM SERPL-MCNC: 1.7 MG/DL — SIGNIFICANT CHANGE UP (ref 1.6–2.6)
MAGNESIUM SERPL-MCNC: 1.9 MG/DL — SIGNIFICANT CHANGE UP (ref 1.6–2.6)
MCHC RBC-ENTMCNC: 30.3 PG — SIGNIFICANT CHANGE UP (ref 27–34)
MCHC RBC-ENTMCNC: 30.3 PG — SIGNIFICANT CHANGE UP (ref 27–34)
MCHC RBC-ENTMCNC: 30.9 PG — SIGNIFICANT CHANGE UP (ref 27–34)
MCHC RBC-ENTMCNC: 30.9 PG — SIGNIFICANT CHANGE UP (ref 27–34)
MCHC RBC-ENTMCNC: 32.9 GM/DL — SIGNIFICANT CHANGE UP (ref 32–36)
MCHC RBC-ENTMCNC: 34 GM/DL — SIGNIFICANT CHANGE UP (ref 32–36)
MCHC RBC-ENTMCNC: 34.6 GM/DL — SIGNIFICANT CHANGE UP (ref 32–36)
MCHC RBC-ENTMCNC: 34.8 GM/DL — SIGNIFICANT CHANGE UP (ref 32–36)
MCV RBC AUTO: 88.9 FL — SIGNIFICANT CHANGE UP (ref 80–100)
MCV RBC AUTO: 89.1 FL — SIGNIFICANT CHANGE UP (ref 80–100)
MCV RBC AUTO: 89.4 FL — SIGNIFICANT CHANGE UP (ref 80–100)
MCV RBC AUTO: 92.1 FL — SIGNIFICANT CHANGE UP (ref 80–100)
METHOD TYPE: SIGNIFICANT CHANGE UP
NRBC # BLD: 0 /100 WBCS — SIGNIFICANT CHANGE UP (ref 0–0)
ORGANISM # SPEC MICROSCOPIC CNT: SIGNIFICANT CHANGE UP
ORGANISM # SPEC MICROSCOPIC CNT: SIGNIFICANT CHANGE UP
PHOSPHATE SERPL-MCNC: 2.7 MG/DL — SIGNIFICANT CHANGE UP (ref 2.5–4.5)
PHOSPHATE SERPL-MCNC: 3.2 MG/DL — SIGNIFICANT CHANGE UP (ref 2.5–4.5)
PHOSPHATE SERPL-MCNC: 3.4 MG/DL — SIGNIFICANT CHANGE UP (ref 2.5–4.5)
PLATELET # BLD AUTO: 123 K/UL — LOW (ref 150–400)
PLATELET # BLD AUTO: 128 K/UL — LOW (ref 150–400)
PLATELET # BLD AUTO: 154 K/UL — SIGNIFICANT CHANGE UP (ref 150–400)
PLATELET # BLD AUTO: 182 K/UL — SIGNIFICANT CHANGE UP (ref 150–400)
POTASSIUM SERPL-MCNC: 3.6 MMOL/L — SIGNIFICANT CHANGE UP (ref 3.5–5.3)
POTASSIUM SERPL-MCNC: 3.6 MMOL/L — SIGNIFICANT CHANGE UP (ref 3.5–5.3)
POTASSIUM SERPL-MCNC: 3.9 MMOL/L — SIGNIFICANT CHANGE UP (ref 3.5–5.3)
POTASSIUM SERPL-MCNC: 4.2 MMOL/L — SIGNIFICANT CHANGE UP (ref 3.5–5.3)
POTASSIUM SERPL-MCNC: SIGNIFICANT CHANGE UP MMOL/L (ref 3.5–5.3)
POTASSIUM SERPL-SCNC: 3.6 MMOL/L — SIGNIFICANT CHANGE UP (ref 3.5–5.3)
POTASSIUM SERPL-SCNC: 3.6 MMOL/L — SIGNIFICANT CHANGE UP (ref 3.5–5.3)
POTASSIUM SERPL-SCNC: 3.9 MMOL/L — SIGNIFICANT CHANGE UP (ref 3.5–5.3)
POTASSIUM SERPL-SCNC: 4.2 MMOL/L — SIGNIFICANT CHANGE UP (ref 3.5–5.3)
POTASSIUM SERPL-SCNC: SIGNIFICANT CHANGE UP MMOL/L (ref 3.5–5.3)
PROT SERPL-MCNC: 5.6 G/DL — LOW (ref 6–8.3)
PROT SERPL-MCNC: 6.1 G/DL — SIGNIFICANT CHANGE UP (ref 6–8.3)
PROT SERPL-MCNC: 6.9 G/DL — SIGNIFICANT CHANGE UP (ref 6–8.3)
PROTHROM AB SERPL-ACNC: 14.2 SEC — HIGH (ref 10–12.9)
PROTHROM AB SERPL-ACNC: 15.1 SEC — HIGH (ref 10–12.9)
RBC # BLD: 2.36 M/UL — LOW (ref 4.2–5.8)
RBC # BLD: 2.41 M/UL — LOW (ref 4.2–5.8)
RBC # BLD: 2.94 M/UL — LOW (ref 4.2–5.8)
RBC # BLD: 3.14 M/UL — LOW (ref 4.2–5.8)
RBC # FLD: 15.3 % — HIGH (ref 10.3–14.5)
RBC # FLD: 15.5 % — HIGH (ref 10.3–14.5)
RBC # FLD: 15.9 % — HIGH (ref 10.3–14.5)
RBC # FLD: 16.3 % — HIGH (ref 10.3–14.5)
RH IG SCN BLD-IMP: POSITIVE — SIGNIFICANT CHANGE UP
SODIUM SERPL-SCNC: 127 MMOL/L — LOW (ref 135–145)
SODIUM SERPL-SCNC: 129 MMOL/L — LOW (ref 135–145)
SODIUM SERPL-SCNC: 131 MMOL/L — LOW (ref 135–145)
SPECIMEN SOURCE: SIGNIFICANT CHANGE UP
WBC # BLD: 12.57 K/UL — HIGH (ref 3.8–10.5)
WBC # BLD: 12.59 K/UL — HIGH (ref 3.8–10.5)
WBC # BLD: 12.71 K/UL — HIGH (ref 3.8–10.5)
WBC # BLD: 14.16 K/UL — HIGH (ref 3.8–10.5)
WBC # FLD AUTO: 12.57 K/UL — HIGH (ref 3.8–10.5)
WBC # FLD AUTO: 12.59 K/UL — HIGH (ref 3.8–10.5)
WBC # FLD AUTO: 12.71 K/UL — HIGH (ref 3.8–10.5)
WBC # FLD AUTO: 14.16 K/UL — HIGH (ref 3.8–10.5)

## 2019-10-13 PROCEDURE — 99291 CRITICAL CARE FIRST HOUR: CPT

## 2019-10-13 PROCEDURE — 71045 X-RAY EXAM CHEST 1 VIEW: CPT | Mod: 26,77,76

## 2019-10-13 PROCEDURE — 71045 X-RAY EXAM CHEST 1 VIEW: CPT | Mod: 26

## 2019-10-13 RX ORDER — CALCIUM GLUCONATE 100 MG/ML
2 VIAL (ML) INTRAVENOUS ONCE
Refills: 0 | Status: COMPLETED | OUTPATIENT
Start: 2019-10-13 | End: 2019-10-13

## 2019-10-13 RX ORDER — MAGNESIUM SULFATE 500 MG/ML
2 VIAL (ML) INJECTION ONCE
Refills: 0 | Status: COMPLETED | OUTPATIENT
Start: 2019-10-13 | End: 2019-10-13

## 2019-10-13 RX ORDER — LACTULOSE 10 G/15ML
30 SOLUTION ORAL ONCE
Refills: 0 | Status: COMPLETED | OUTPATIENT
Start: 2019-10-13 | End: 2019-10-13

## 2019-10-13 RX ORDER — POTASSIUM CHLORIDE 20 MEQ
20 PACKET (EA) ORAL
Refills: 0 | Status: COMPLETED | OUTPATIENT
Start: 2019-10-13 | End: 2019-10-13

## 2019-10-13 RX ADMIN — TAMSULOSIN HYDROCHLORIDE 0.4 MILLIGRAM(S): 0.4 CAPSULE ORAL at 21:26

## 2019-10-13 RX ADMIN — Medication 25 MILLIGRAM(S): at 11:33

## 2019-10-13 RX ADMIN — Medication 20 MILLIGRAM(S): at 19:30

## 2019-10-13 RX ADMIN — LACTULOSE 30 GRAM(S): 10 SOLUTION ORAL at 11:33

## 2019-10-13 RX ADMIN — Medication 50 GRAM(S): at 23:49

## 2019-10-13 RX ADMIN — AMIODARONE HYDROCHLORIDE 400 MILLIGRAM(S): 400 TABLET ORAL at 15:13

## 2019-10-13 RX ADMIN — Medication 20 MILLIEQUIVALENT(S): at 11:33

## 2019-10-13 RX ADMIN — AMIODARONE HYDROCHLORIDE 400 MILLIGRAM(S): 400 TABLET ORAL at 21:26

## 2019-10-13 RX ADMIN — Medication 50 MILLIEQUIVALENT(S): at 05:14

## 2019-10-13 RX ADMIN — Medication 200 GRAM(S): at 11:33

## 2019-10-13 RX ADMIN — Medication 300 MILLIGRAM(S): at 05:24

## 2019-10-13 RX ADMIN — Medication 125 MILLILITER(S): at 00:40

## 2019-10-13 RX ADMIN — Medication 81 MILLIGRAM(S): at 11:33

## 2019-10-13 RX ADMIN — Medication 100 MILLIGRAM(S): at 05:24

## 2019-10-13 RX ADMIN — AMIODARONE HYDROCHLORIDE 400 MILLIGRAM(S): 400 TABLET ORAL at 05:24

## 2019-10-13 RX ADMIN — HEPARIN SODIUM 5000 UNIT(S): 5000 INJECTION INTRAVENOUS; SUBCUTANEOUS at 11:33

## 2019-10-13 RX ADMIN — HEPARIN SODIUM 5000 UNIT(S): 5000 INJECTION INTRAVENOUS; SUBCUTANEOUS at 05:24

## 2019-10-13 RX ADMIN — HEPARIN SODIUM 5000 UNIT(S): 5000 INJECTION INTRAVENOUS; SUBCUTANEOUS at 21:26

## 2019-10-13 RX ADMIN — CLOPIDOGREL BISULFATE 75 MILLIGRAM(S): 75 TABLET, FILM COATED ORAL at 11:33

## 2019-10-13 RX ADMIN — CHLORHEXIDINE GLUCONATE 1 APPLICATION(S): 213 SOLUTION TOPICAL at 11:34

## 2019-10-13 RX ADMIN — Medication 25 MILLIGRAM(S): at 05:24

## 2019-10-13 RX ADMIN — Medication 100 MILLIGRAM(S): at 11:33

## 2019-10-13 RX ADMIN — ATORVASTATIN CALCIUM 80 MILLIGRAM(S): 80 TABLET, FILM COATED ORAL at 21:26

## 2019-10-13 RX ADMIN — Medication 50 MILLIEQUIVALENT(S): at 04:04

## 2019-10-13 RX ADMIN — Medication 0.5 MILLIGRAM(S): at 18:51

## 2019-10-13 RX ADMIN — Medication 20 MILLIGRAM(S): at 05:24

## 2019-10-13 RX ADMIN — Medication 25 MILLIGRAM(S): at 23:50

## 2019-10-13 RX ADMIN — Medication 25 MILLIGRAM(S): at 19:12

## 2019-10-13 RX ADMIN — Medication 300 MILLIGRAM(S): at 19:12

## 2019-10-13 RX ADMIN — PANTOPRAZOLE SODIUM 40 MILLIGRAM(S): 20 TABLET, DELAYED RELEASE ORAL at 06:02

## 2019-10-13 RX ADMIN — OXYCODONE AND ACETAMINOPHEN 2 TABLET(S): 5; 325 TABLET ORAL at 00:02

## 2019-10-13 NOTE — CHART NOTE - NSCHARTNOTEFT_GEN_A_CORE
As per Dr. Anaya, chest tube removed at bedside without incident. Occlusive dressing applied. Patient tolerated procedure well. Follow up CXR pending.

## 2019-10-13 NOTE — PROGRESS NOTE ADULT - SUBJECTIVE AND OBJECTIVE BOX
INTERVAL HPI/OVERNIGHT EVENTS:    POD#5 CABG x 2; reexploration for bleeding - tamponade identified  EF 45%    80yo Male Hx tobacco use, HTN, chol, Renal artery stenosis - Left renal stent (5/2011), BPH, CAD/MI - stents w/thrombectomy; tandem assist ('10) presenting with CP    recent visit PCP - EKG was changed, Echo was abnormal - referred to cardiology 10/17.     EKG: sinus. TWI II, III, ST depressions In V4-V6.  CE (-)    Cath 10/7: LM normal, 99% mLAD, pLCx 100% with L-L collaterals, mRCA 100% with R-R collaterals.  IABP placed (Rt femoral)  admitted to ICU and CTS consulted    To OR 10/8  Urology consulted intraop for difficult clark placement - (+)BPH/large amount urine drained - 18 Coude placed     intraop:   No blood products given ; 2 L Crystalloid given   arrived to ICU on Shawn     RTOR - re-exploration for bleeding - Bleeding source identified:  3 U pRBC/2 FFP and 2L Crystalloid given    IABP removed in ICU and patient extubated 10/9 to HFNC (50/60) - baseline dopplerable pulses by report   patient awake/alert and interactive   drains removed 10/10 - small bilateral apical PTX reported post-removal     Right pigtail placed overnight after review of am xray - PTX enlarged; remains on HFNC  on amiodarone infusion     Hypoxemic on 6L NC - xray reviewed - inc SC emphysema on left and PTX inc - pigtail placed with re-expansion noted on repeat xray. Additionally right pigtail placed for progression of apical PTX 10/11 - on HFNC but able to be titrated to NC 10/12 and remains on NC overnight and this am    d/w urology - plan TOV today - clark removed  lactulose given as no BM yet - alot of flatus per patient    No acute events reported overnight     PMHx includes but is not limited to:   Gout  HLD (hyperlipidemia)  HTN (hypertension)  Renal artery stenosis  CAD (coronary artery disease)  H/O renal artery stenosis: s/p stent  S/P coronary artery stent placement  BPH    ICU Vital Signs Last 24 Hrs  T(C): 36.8 (13 Oct 2019 10:40), Max: 37.2 (13 Oct 2019 05:01)  T(F): 98.2 (13 Oct 2019 10:40), Max: 99 (13 Oct 2019 05:01)  HR: 68 (13 Oct 2019 11:00) (1 - 106) sinus with APC  BP: 123/55 (13 Oct 2019 11:00) (123/55 - 130/74)  BP(mean): 79 (13 Oct 2019 11:00) (79 - 85)  ABP: 136/48 (13 Oct 2019 10:00) (100/38 - 166/46)  ABP(mean): 74 (13 Oct 2019 10:00) (56 - 80)  SpO2: 96% (13 Oct 2019 11:00) (91% - 96%) 4L NC    Qtts: None    I&O's Summary    12 Oct 2019 07:01  -  13 Oct 2019 07:00  --------------------------------------------------------  IN: 2048 mL / OUT: 2611 mL / NET: -563 mL    13 Oct 2019 07:01  -  13 Oct 2019 12:18  --------------------------------------------------------  IN: 125 mL / OUT: 440 mL / NET: -315 mL    Physical Exam    Heart regular (-)rub/gallop  Lungs - CTA anterior/posterior - no rub/gallop  Abd -(+)BS soft NTND (-)r/r/g  Ext -warm to touch; no cyanosis/clubbing  Chest - incision site clean and dry  Neuro - alert/oriented and interactive - non-focal   Skin - no rash     LABS:                        8.9    12.71 )-----------( 154      ( 13 Oct 2019 10:14 )             26.2     10-13    127<L>  |  93<L>  |  27<H>  ----------------------------<  160<H>  3.9   |  23  |  1.21    Ca    8.6      13 Oct 2019 10:14  Phos  2.7     10-13  Mg     1.7     10-13    TPro  6.1  /  Alb  3.3  /  TBili  1.4<H>  /  DBili  x   /  AST  40  /  ALT  23  /  AlkPhos  105  10-13    PT/INR - ( 13 Oct 2019 10:14 )   PT: 14.2 sec;   INR: 1.25     PTT - ( 13 Oct 2019 10:14 )  PTT:26.7 sec    ABG - ( 13 Oct 2019 10:12 ) 7.51/31/103/98    RADIOLOGY & ADDITIONAL STUDIES: reviewed  Patient with multiple medical problems including known CAD now POD#3 CABG with RTOR for bleeding in short interval - stasis achieved and now extubated/off pressors    1. CV   hemodynamically stable  sinus rhythm  LA normalized 1.2  ASA/Plavix/statin  completed periop Abx prophylaxis - tolerated cefazolin despite listed "PCN allergy"  amiodarone po load   Metoprolol 12.5 q6h with hold parameters - titrate as clinical scenario allows    2. Pulm   extubated 10/10 to HFNC - prior trials titrate to NC unsucessful - Sa02 drops to mid to high 80's  pigtails placed for progressive PTX - chest tubes removed 10/10 with documented re-expansion   titrate supplemental oxygen down as clinical scenario allows  ambulation with staff assist and incentive spirometry    3. Renal/Urology  Hx BPH -   intraop Urology consult for coude placement  restarted on Flomax  d/w urology 10/12 - plan d/c clark - TOV today   monitor UO/Lytes and Cr - 01.02    4. Endocrine  maintain glycemic control   ISS; serum 135   HgA1c 4.9    pain management  bowel regimen - no BM since pre-op (+)flatus - miralax ordered qd - to be given now    DVT and GI prophylaxis    d/w patient & family present in room/staff and CTS      I have spent/provided stated minutes of critical care time to this patient: 60 min INTERVAL HPI/OVERNIGHT EVENTS:    POD#5 CABG x 2; reexploration for bleeding - tamponade identified  EF 45%    78yo Male Hx tobacco use, HTN, chol, Renal artery stenosis - Left renal stent (5/2011), BPH, CAD/MI - stents w/thrombectomy; tandem assist ('10) presenting with CP    recent visit PCP - EKG was changed, Echo was abnormal - referred to cardiology 10/17.     EKG: sinus. TWI II, III, ST depressions In V4-V6.  CE (-)    Cath 10/7: LM normal, 99% mLAD, pLCx 100% with L-L collaterals, mRCA 100% with R-R collaterals.  IABP placed (Rt femoral)  admitted to ICU and CTS consulted    To OR 10/8  Urology consulted intraop for difficult clark placement - (+)BPH/large amount urine drained - 18 Coude placed     intraop:   No blood products given ; 2 L Crystalloid given   arrived to ICU on Shawn     RTOR - re-exploration for bleeding - Bleeding source identified:  3 U pRBC/2 FFP and 2L Crystalloid given    IABP removed in ICU and patient extubated 10/9 to HFNC (50/60) - baseline dopplerable pulses by report   patient awake/alert and interactive   drains removed 10/10 - small bilateral apical PTX reported post-removal     Right pigtail placed overnight after review of am xray - PTX enlarged; remains on HFNC  on amiodarone infusion     Hypoxemic on 6L NC - xray reviewed - inc SC emphysema on left and PTX inc - pigtail placed with re-expansion noted on repeat xray. Additionally right pigtail placed for progression of apical PTX 10/11 - on HFNC but able to be titrated to NC 10/12 and remains on NC overnight and this am    d/w urology - plan TOV today - clark removed  lactulose given as no BM yet - alot of flatus per patient    No acute events reported overnight     PMHx includes but is not limited to:   Gout  HLD (hyperlipidemia)  HTN (hypertension)  Renal artery stenosis  CAD (coronary artery disease)  H/O renal artery stenosis: s/p stent  S/P coronary artery stent placement  BPH    ICU Vital Signs Last 24 Hrs  T(C): 36.8 (13 Oct 2019 10:40), Max: 37.2 (13 Oct 2019 05:01)  T(F): 98.2 (13 Oct 2019 10:40), Max: 99 (13 Oct 2019 05:01)  HR: 68 (13 Oct 2019 11:00) (1 - 106) sinus with APC  BP: 123/55 (13 Oct 2019 11:00) (123/55 - 130/74)  BP(mean): 79 (13 Oct 2019 11:00) (79 - 85)  ABP: 136/48 (13 Oct 2019 10:00) (100/38 - 166/46)  ABP(mean): 74 (13 Oct 2019 10:00) (56 - 80)  SpO2: 96% (13 Oct 2019 11:00) (91% - 96%) 4L NC    Qtts: None    I&O's Summary    12 Oct 2019 07:01  -  13 Oct 2019 07:00  --------------------------------------------------------  IN: 2048 mL / OUT: 2611 mL / NET: -563 mL    13 Oct 2019 07:01  -  13 Oct 2019 12:18  --------------------------------------------------------  IN: 125 mL / OUT: 440 mL / NET: -315 mL    Physical Exam    Heart regular (-)rub/gallop  Lungs - CTA anterior/posterior - no rub/gallop  Abd -(+)BS soft NTND (-)r/r/g  Ext -warm to touch; no cyanosis/clubbing  Chest - incision site clean and dry  Neuro - alert/oriented and interactive - non-focal   Skin - no rash     LABS:                        8.9    12.71 )-----------( 154      ( 13 Oct 2019 10:14 )             26.2     10-13    127<L>  |  93<L>  |  27<H>  ----------------------------<  160<H>  3.9   |  23  |  1.21    Ca    8.6      13 Oct 2019 10:14  Phos  2.7     10-13  Mg     1.7     10-13    TPro  6.1  /  Alb  3.3  /  TBili  1.4<H>  /  DBili  x   /  AST  40  /  ALT  23  /  AlkPhos  105  10-13    PT/INR - ( 13 Oct 2019 10:14 )   PT: 14.2 sec;   INR: 1.25     PTT - ( 13 Oct 2019 10:14 )  PTT:26.7 sec    ABG - ( 13 Oct 2019 10:12 ) 7.51/31/103/98    RADIOLOGY & ADDITIONAL STUDIES: reviewed  Patient with multiple medical problems including known CAD now POD#3 CABG with RTOR for bleeding in short interval - stasis achieved and now extubated/off pressors    1. CV   hemodynamically stable  sinus rhythm  LA normalized 1.2  ASA/Plavix/statin  completed periop Abx prophylaxis - tolerated cefazolin despite listed "PCN allergy"  amiodarone po load   Metoprolol 25 q6h with hold parameters - titrate as clinical scenario allows  lasix 20mg IV BID    2. Pulm   extubated 10/10 to HFNC - prior trials titrate to NC unsucessful - Sa02 drops to mid to high 80's  pigtails placed for progressive PTX - chest tubes removed 10/10 with documented re-expansion   pigtails placed to water seal 10/12 - plan serial clamping and xray - possible removal later today  ambulation with staff assist and incentive spirometry  nebs  on 2L NC      3. Renal/Urology  Hx BPH -   intraop Urology consult for coude placement  restarted on Flomax  d/w urology 10/12 - plan d/c clark - TOV today   monitor UO/Lytes and Cr - 01.02    4. Endocrine  maintain glycemic control   ISS; serum 135   HgA1c 4.9    pain management  bowel regimen - no BM since pre-op (+)flatus - miralax given 10/12; lactulose ordered this am - plan suppository or enema of no BM today    DVT and GI prophylaxis    d/w patient & family present in room/staff and CTS    I have spent/provided stated minutes of critical care time to this patient: 60 min

## 2019-10-14 LAB
ALBUMIN SERPL ELPH-MCNC: 3.2 G/DL — LOW (ref 3.3–5)
ALBUMIN SERPL ELPH-MCNC: 3.8 G/DL — SIGNIFICANT CHANGE UP (ref 3.3–5)
ALP SERPL-CCNC: 134 U/L — HIGH (ref 40–120)
ALP SERPL-CCNC: 149 U/L — HIGH (ref 40–120)
ALT FLD-CCNC: 28 U/L — SIGNIFICANT CHANGE UP (ref 10–45)
ALT FLD-CCNC: 34 U/L — SIGNIFICANT CHANGE UP (ref 10–45)
ANION GAP SERPL CALC-SCNC: 11 MMOL/L — SIGNIFICANT CHANGE UP (ref 5–17)
ANION GAP SERPL CALC-SCNC: 13 MMOL/L — SIGNIFICANT CHANGE UP (ref 5–17)
APTT BLD: 28.6 SEC — SIGNIFICANT CHANGE UP (ref 27.5–36.3)
AST SERPL-CCNC: 43 U/L — HIGH (ref 10–40)
AST SERPL-CCNC: 46 U/L — HIGH (ref 10–40)
BILIRUB SERPL-MCNC: 1.3 MG/DL — HIGH (ref 0.2–1.2)
BILIRUB SERPL-MCNC: 1.4 MG/DL — HIGH (ref 0.2–1.2)
BUN SERPL-MCNC: 25 MG/DL — HIGH (ref 7–23)
BUN SERPL-MCNC: 29 MG/DL — HIGH (ref 7–23)
CALCIUM SERPL-MCNC: 8.8 MG/DL — SIGNIFICANT CHANGE UP (ref 8.4–10.5)
CALCIUM SERPL-MCNC: 9.2 MG/DL — SIGNIFICANT CHANGE UP (ref 8.4–10.5)
CHLORIDE SERPL-SCNC: 94 MMOL/L — LOW (ref 96–108)
CHLORIDE SERPL-SCNC: 95 MMOL/L — LOW (ref 96–108)
CHLORIDE UR-SCNC: 94 MMOL/L — SIGNIFICANT CHANGE UP
CO2 SERPL-SCNC: 23 MMOL/L — SIGNIFICANT CHANGE UP (ref 22–31)
CO2 SERPL-SCNC: 24 MMOL/L — SIGNIFICANT CHANGE UP (ref 22–31)
CREAT SERPL-MCNC: 1.3 MG/DL — SIGNIFICANT CHANGE UP (ref 0.5–1.3)
CREAT SERPL-MCNC: 1.49 MG/DL — HIGH (ref 0.5–1.3)
GLUCOSE SERPL-MCNC: 101 MG/DL — HIGH (ref 70–99)
GLUCOSE SERPL-MCNC: 136 MG/DL — HIGH (ref 70–99)
HCT VFR BLD CALC: 26.9 % — LOW (ref 39–50)
HCT VFR BLD CALC: 30.4 % — LOW (ref 39–50)
HGB BLD-MCNC: 10.1 G/DL — LOW (ref 13–17)
HGB BLD-MCNC: 9.1 G/DL — LOW (ref 13–17)
INR BLD: 1.14 — SIGNIFICANT CHANGE UP (ref 0.88–1.16)
MAGNESIUM SERPL-MCNC: 2 MG/DL — SIGNIFICANT CHANGE UP (ref 1.6–2.6)
MAGNESIUM SERPL-MCNC: 2.8 MG/DL — HIGH (ref 1.6–2.6)
MCHC RBC-ENTMCNC: 30 PG — SIGNIFICANT CHANGE UP (ref 27–34)
MCHC RBC-ENTMCNC: 30.3 PG — SIGNIFICANT CHANGE UP (ref 27–34)
MCHC RBC-ENTMCNC: 33.2 GM/DL — SIGNIFICANT CHANGE UP (ref 32–36)
MCHC RBC-ENTMCNC: 33.8 GM/DL — SIGNIFICANT CHANGE UP (ref 32–36)
MCV RBC AUTO: 88.8 FL — SIGNIFICANT CHANGE UP (ref 80–100)
MCV RBC AUTO: 91.3 FL — SIGNIFICANT CHANGE UP (ref 80–100)
NRBC # BLD: 0 /100 WBCS — SIGNIFICANT CHANGE UP (ref 0–0)
OSMOLALITY UR: 409 MOSMOL/KG — SIGNIFICANT CHANGE UP (ref 100–650)
PHOSPHATE SERPL-MCNC: 3.4 MG/DL — SIGNIFICANT CHANGE UP (ref 2.5–4.5)
PHOSPHATE SERPL-MCNC: 4 MG/DL — SIGNIFICANT CHANGE UP (ref 2.5–4.5)
PLATELET # BLD AUTO: 176 K/UL — SIGNIFICANT CHANGE UP (ref 150–400)
PLATELET # BLD AUTO: 213 K/UL — SIGNIFICANT CHANGE UP (ref 150–400)
POTASSIUM SERPL-MCNC: 3.8 MMOL/L — SIGNIFICANT CHANGE UP (ref 3.5–5.3)
POTASSIUM SERPL-MCNC: 4 MMOL/L — SIGNIFICANT CHANGE UP (ref 3.5–5.3)
POTASSIUM SERPL-SCNC: 3.8 MMOL/L — SIGNIFICANT CHANGE UP (ref 3.5–5.3)
POTASSIUM SERPL-SCNC: 4 MMOL/L — SIGNIFICANT CHANGE UP (ref 3.5–5.3)
PROT SERPL-MCNC: 6.2 G/DL — SIGNIFICANT CHANGE UP (ref 6–8.3)
PROT SERPL-MCNC: 7 G/DL — SIGNIFICANT CHANGE UP (ref 6–8.3)
PROTHROM AB SERPL-ACNC: 12.9 SEC — SIGNIFICANT CHANGE UP (ref 10–12.9)
RBC # BLD: 3.03 M/UL — LOW (ref 4.2–5.8)
RBC # BLD: 3.33 M/UL — LOW (ref 4.2–5.8)
RBC # FLD: 16 % — HIGH (ref 10.3–14.5)
RBC # FLD: 16 % — HIGH (ref 10.3–14.5)
SODIUM SERPL-SCNC: 129 MMOL/L — LOW (ref 135–145)
SODIUM SERPL-SCNC: 131 MMOL/L — LOW (ref 135–145)
SODIUM UR-SCNC: 100 MMOL/L — SIGNIFICANT CHANGE UP
WBC # BLD: 13.32 K/UL — HIGH (ref 3.8–10.5)
WBC # BLD: 14.3 K/UL — HIGH (ref 3.8–10.5)
WBC # FLD AUTO: 13.32 K/UL — HIGH (ref 3.8–10.5)
WBC # FLD AUTO: 14.3 K/UL — HIGH (ref 3.8–10.5)

## 2019-10-14 PROCEDURE — 71045 X-RAY EXAM CHEST 1 VIEW: CPT | Mod: 26

## 2019-10-14 PROCEDURE — 99291 CRITICAL CARE FIRST HOUR: CPT

## 2019-10-14 RX ORDER — MAGNESIUM SULFATE 500 MG/ML
2 VIAL (ML) INJECTION ONCE
Refills: 0 | Status: COMPLETED | OUTPATIENT
Start: 2019-10-14 | End: 2019-10-14

## 2019-10-14 RX ORDER — SODIUM CHLORIDE 9 MG/ML
3 INJECTION INTRAMUSCULAR; INTRAVENOUS; SUBCUTANEOUS EVERY 8 HOURS
Refills: 0 | Status: DISCONTINUED | OUTPATIENT
Start: 2019-10-14 | End: 2019-10-17

## 2019-10-14 RX ORDER — POTASSIUM CHLORIDE 20 MEQ
40 PACKET (EA) ORAL ONCE
Refills: 0 | Status: COMPLETED | OUTPATIENT
Start: 2019-10-14 | End: 2019-10-14

## 2019-10-14 RX ORDER — OXYCODONE HYDROCHLORIDE 5 MG/1
5 TABLET ORAL EVERY 6 HOURS
Refills: 0 | Status: DISCONTINUED | OUTPATIENT
Start: 2019-10-14 | End: 2019-10-17

## 2019-10-14 RX ADMIN — HEPARIN SODIUM 5000 UNIT(S): 5000 INJECTION INTRAVENOUS; SUBCUTANEOUS at 07:02

## 2019-10-14 RX ADMIN — TAMSULOSIN HYDROCHLORIDE 0.4 MILLIGRAM(S): 0.4 CAPSULE ORAL at 21:29

## 2019-10-14 RX ADMIN — Medication 20 MILLIGRAM(S): at 19:02

## 2019-10-14 RX ADMIN — Medication 0.5 MILLIGRAM(S): at 19:03

## 2019-10-14 RX ADMIN — Medication 25 MILLIGRAM(S): at 13:04

## 2019-10-14 RX ADMIN — SODIUM CHLORIDE 3 MILLILITER(S): 9 INJECTION INTRAMUSCULAR; INTRAVENOUS; SUBCUTANEOUS at 21:10

## 2019-10-14 RX ADMIN — ATORVASTATIN CALCIUM 80 MILLIGRAM(S): 80 TABLET, FILM COATED ORAL at 21:29

## 2019-10-14 RX ADMIN — Medication 81 MILLIGRAM(S): at 13:04

## 2019-10-14 RX ADMIN — Medication 40 MILLIEQUIVALENT(S): at 07:03

## 2019-10-14 RX ADMIN — CLOPIDOGREL BISULFATE 75 MILLIGRAM(S): 75 TABLET, FILM COATED ORAL at 13:04

## 2019-10-14 RX ADMIN — AMIODARONE HYDROCHLORIDE 400 MILLIGRAM(S): 400 TABLET ORAL at 15:44

## 2019-10-14 RX ADMIN — HEPARIN SODIUM 5000 UNIT(S): 5000 INJECTION INTRAVENOUS; SUBCUTANEOUS at 21:29

## 2019-10-14 RX ADMIN — AMIODARONE HYDROCHLORIDE 400 MILLIGRAM(S): 400 TABLET ORAL at 07:02

## 2019-10-14 RX ADMIN — HEPARIN SODIUM 5000 UNIT(S): 5000 INJECTION INTRAVENOUS; SUBCUTANEOUS at 13:04

## 2019-10-14 RX ADMIN — Medication 300 MILLIGRAM(S): at 07:02

## 2019-10-14 RX ADMIN — Medication 20 MILLIGRAM(S): at 07:02

## 2019-10-14 RX ADMIN — Medication 25 MILLIGRAM(S): at 07:03

## 2019-10-14 RX ADMIN — AMIODARONE HYDROCHLORIDE 400 MILLIGRAM(S): 400 TABLET ORAL at 21:29

## 2019-10-14 RX ADMIN — PANTOPRAZOLE SODIUM 40 MILLIGRAM(S): 20 TABLET, DELAYED RELEASE ORAL at 07:02

## 2019-10-14 RX ADMIN — Medication 300 MILLIGRAM(S): at 19:03

## 2019-10-14 RX ADMIN — CHLORHEXIDINE GLUCONATE 1 APPLICATION(S): 213 SOLUTION TOPICAL at 13:00

## 2019-10-14 RX ADMIN — Medication 0.5 MILLIGRAM(S): at 07:00

## 2019-10-14 RX ADMIN — Medication 20 MILLIEQUIVALENT(S): at 13:04

## 2019-10-14 RX ADMIN — Medication 25 MILLIGRAM(S): at 19:10

## 2019-10-14 RX ADMIN — Medication 50 GRAM(S): at 13:04

## 2019-10-14 NOTE — PROGRESS NOTE ADULT - ASSESSMENT
This is a 79 year old male, former smoker, with history of HTN, HLD, renal artery stenosis s/p left renal stent in 2011, CAD s/p MI with thrombectomy and PES (ROBERTA to pLAD and PTCA/ROBERTA to mLAD in 6/2010 with ROBERTA to LCx with tandemheart/LVAD a week later on 6/21/2010 who presented to [facility] ED on 10/7/19 with substernal chest pain/pressure, admitted to r/o ACS.  Subsequent cardiac cath showing LM normal, 99% mLAD, pLCx 100% with L-L collaterals, mRCA 100% with R-R collaterals.  IABP was placed and he was admitted to CCU for further evaluation and management.  CTS consulted for surgical evaluation and on 10/8/19 he underwent OPCAB x 2, post op EF 45%.  Intraop urology consulted for difficult clark placement.  Noted to have enlarged prostate with large amount of urine draining upon clark insertion.  Post op he was transferred to the CTICU and later returned to the OR on POD 0 for bleeding and tamponade.  Hemostasis achieved and patient received 3 u PRBC and 2 FFP.  POD 1 IABP was removed and he was later extubated.  POD 2 small bilateral pneumothorax post CT removal that enlarged on POD 3 requiring pigtail placement.  Pigtails later removed on POD 5.  Clark removed on POD 5, patient passed TOV but only voiding small amounts with high PVRs.  Patient remains asymptomatic and refused clark placement.  Today, POD 6 he is transferred from ICU to telemetry.      Neurovascular: No delirium. Pain well controlled with current regimen.  -con't oxycodone prn.    Cardiovascular: Hemodynamically stable. HR controlled.  -con't lopressor, asa, plavix, and statin   -con't amio load for post op paroxysmal afib started on POD 2, last in afib this morning, currently in sinus.    -start eliquis today?      Respiratory: Saturating well on 3 L NC.  -If on oxygen wean to RA from for O2 Sat > 93%.  -Encourage C+DB and Use of IS 10x / hr while awake.  -PA/lat in am    GI: Stable, +BM post op  -PPX with protonix  -PO Diet.    Renal / : enlarged prostate with difficult clark placement intraop.    -urology following  -con't flomax  -urinating small amounts with high PVRs.  Currently refusing clark and asymptomatic, urology aware  -will con't to monitor closely     Endocrine:    -A1c 4.9    Hematologic:  -possible start eliquis for a/c 2/2 afib     ID:  -afebrile     Prophylaxis:  -DVT prophylaxis with 5000 SubQ Heparin q8h.  -SCD's    Disposition:  -home when medically ready

## 2019-10-14 NOTE — PROGRESS NOTE ADULT - SUBJECTIVE AND OBJECTIVE BOX
ICU to telemetry transfer    Operation / Date: 10/8/19 -  OP CABG x 2 (lima-lad, svg-om), EF 45%   10/8 RTOR Reexploration for bleeding and tamponade     SUBJECTIVE ASSESSMENT:  Feeling well with no acute complaints.  He is ambulating with assistance but feels he is getting stronger.  + BM yesterday.  Denies HA, diziness, CP, SOB, palpitations, N/V/D/C, leg swelling, or calf tenderness.      Vital Signs Last 24 Hrs  T(C): 36.7 (14 Oct 2019 14:25), Max: 36.7 (13 Oct 2019 18:30)  T(F): 98 (14 Oct 2019 14:25), Max: 98 (13 Oct 2019 18:30)  HR: 61 (14 Oct 2019 15:20) (58 - 106)  BP: 137/57 (14 Oct 2019 15:20) (101/54 - 147/64)  BP(mean): 92 (14 Oct 2019 15:20) (64 - 112)  RR: 21 (14 Oct 2019 15:20) (14 - 37)  SpO2: 96% (14 Oct 2019 15:20) (93% - 99%)  I&O's Detail    13 Oct 2019 07:01  -  14 Oct 2019 07:00  --------------------------------------------------------  IN:    IV PiggyBack: 150 mL    Oral Fluid: 330 mL    sodium chloride 0.9%: 25 mL  Total IN: 505 mL    OUT:    Incontinent per Condom Catheter: 1490 mL    Indwelling Catheter - Urethral: 440 mL  Total OUT: 1930 mL    Total NET: -1425 mL      14 Oct 2019 07:01  -  14 Oct 2019 17:19  --------------------------------------------------------  IN:  Total IN: 0 mL    OUT:    Voided: 160 mL  Total OUT: 160 mL    Total NET: -160 mL    CHEST TUBE:  No.  KELLIE DRAIN:  No.  EPICARDIAL WIRES: Yes.  TIE DOWNS: Yes.  LIRIANO: No.    PHYSICAL EXAM:    General: OOB in chair, comfortable, NAD    Neurological: A&O x 3 RIVERA, no focal deficits    Cardiovascular: S1S2 RRR No M/G/R    Respiratory: CTA b/l No W/R/R    Gastrointestinal: soft, NT/ND    Extremities: 1 + pitting edema of RLE. LLE without edema.  No Calf tenderness    Vascular: warm and well perfused    Incision Sites: MSI healing well no drainage, no dehiscence, no sternal click     LABS:                        10.1   14.30 )-----------( 213      ( 14 Oct 2019 15:13 )             30.4       COUMADIN:  No.    PT/INR - ( 14 Oct 2019 14:58 )   PT: 12.9 sec;   INR: 1.14          PTT - ( 14 Oct 2019 14:58 )  PTT:28.6 sec    10-14    131<L>  |  95<L>  |  29<H>  ----------------------------<  136<H>  4.0   |  23  |  1.49<H>    Ca    9.2      14 Oct 2019 14:57  Phos  4.0     10-14  Mg     2.8     10-14    TPro  7.0  /  Alb  3.8  /  TBili  1.3<H>  /  DBili  x   /  AST  46<H>  /  ALT  34  /  AlkPhos  149<H>  10-14      MEDICATIONS  (STANDING):  allopurinol 300 milliGRAM(s) Oral two times a day  amiodarone    Tablet 400 milliGRAM(s) Oral every 8 hours  amiodarone    Tablet   Oral   aspirin enteric coated 81 milliGRAM(s) Oral daily  atorvastatin 80 milliGRAM(s) Oral at bedtime  buDESOnide    Inhalation Suspension 0.5 milliGRAM(s) Inhalation every 12 hours  clopidogrel Tablet 75 milliGRAM(s) Oral daily  docusate sodium 100 milliGRAM(s) Oral three times a day  furosemide   Injectable 20 milliGRAM(s) IV Push every 12 hours  heparin  Injectable 5000 Unit(s) SubCutaneous every 8 hours  metoprolol tartrate 25 milliGRAM(s) Oral every 6 hours  pantoprazole    Tablet 40 milliGRAM(s) Oral before breakfast  potassium chloride    Tablet ER 20 milliEquivalent(s) Oral daily  senna 1 Tablet(s) Oral at bedtime  sodium chloride 0.9% lock flush 3 milliLiter(s) IV Push every 8 hours  tamsulosin 0.4 milliGRAM(s) Oral at bedtime    MEDICATIONS  (PRN):  levalbuterol Inhalation 0.63 milliGRAM(s) Inhalation every 6 hours PRN SOB  oxyCODONE    IR 5 milliGRAM(s) Oral every 6 hours PRN Moderate Pain (4 - 6)  polyethylene glycol 3350 17 Gram(s) Oral daily PRN Constipation        RADIOLOGY & ADDITIONAL TESTS:

## 2019-10-14 NOTE — PROGRESS NOTE ADULT - SUBJECTIVE AND OBJECTIVE BOX
CTICU  CRITICAL  CARE  ATTENDING:   				   Pertinent clinical, laboratory, radiographic, hemodynamic, echocardiographic, respiratory data, microbiologic data and chart were reviewed and analyzed frequently throughout the course of the day and night    Patient seen and examined with CTS/ SH attending at bedside    Pt is a 79y Male admitted for CABG      HEALTH ISSUES - PROBLEM Dx:  HLD (hyperlipidemia): HLD (hyperlipidemia)  HTN (hypertension): HTN (hypertension)  Renal artery stenosis: Renal artery stenosis  CAD (coronary artery disease): CAD (coronary artery disease)         FAMILY HISTORY:  PAST MEDICAL & SURGICAL HISTORY:  Gout  HLD (hyperlipidemia)  HTN (hypertension)  Renal artery stenosis  CAD (coronary artery disease)  H/O renal artery stenosis: s/p stent  S/P coronary artery stent placement      14 system review was unremarkable      Vital signs, hemodynamic and respiratory parameters were reviewed from the bedside nursing flowsheet.  ICU Vital Signs Last 24 Hrs  T(C): 36.3 (14 Oct 2019 05:01), Max: 36.8 (13 Oct 2019 10:40)  T(F): 97.4 (14 Oct 2019 05:01), Max: 98.2 (13 Oct 2019 10:40)  HR: 60 (14 Oct 2019 09:00) (58 - 106)  BP: 132/54 (14 Oct 2019 09:00) (101/54 - 147/64)  BP(mean): 108 (14 Oct 2019 09:00) (64 - 112)  ABP: 136/48 (13 Oct 2019 10:00) (136/48 - 136/48)  ABP(mean): 74 (13 Oct 2019 10:00) (74 - 74)  RR: 21 (14 Oct 2019 09:00) (14 - 27)  SpO2: 94% (14 Oct 2019 09:00) (92% - 99%)    Adult Advanced Hemodynamics Last 24 Hrs  CVP(mm Hg): --  CVP(cm H2O): --  CO: --  CI: --  PA: --  PA(mean): --  PCWP: --  SVR: --  SVRI: --  PVR: --  PVRI: --, ABG - ( 13 Oct 2019 10:12 )  pH, Arterial: 7.51  pH, Blood: x     /  pCO2: 31    /  pO2: 103   / HCO3: 24    / Base Excess: 1.5   /  SaO2: 98                  Intake and output was reviewed and the fluid balance was calculated  Daily     Daily   I&O's Summary    13 Oct 2019 07:01  -  14 Oct 2019 07:00  --------------------------------------------------------  IN: 505 mL / OUT: 1930 mL / NET: -1425 mL    14 Oct 2019 07:01  -  14 Oct 2019 09:28  --------------------------------------------------------  IN: 0 mL / OUT: 160 mL / NET: -160 mL        All lines and drain sites were assessed  Glycemic trend was reviewedCAPILLARY BLOOD GLUCOSE            Exam:   Gen: NAD   Neuro: Mental status  Lungs: Auscultation of the chest reveals equal bs  Abd: soft, nt/nd  Ext: warm and well perfused  Wound: appears clean and unremarkable  Antibiotics are periop      labs  CBC Full  -  ( 14 Oct 2019 05:16 )  WBC Count : 13.32 K/uL  RBC Count : 3.03 M/uL  Hemoglobin : 9.1 g/dL  Hematocrit : 26.9 %  Platelet Count - Automated : 176 K/uL  Mean Cell Volume : 88.8 fl  Mean Cell Hemoglobin : 30.0 pg  Mean Cell Hemoglobin Concentration : 33.8 gm/dL  Auto Neutrophil # : x  Auto Lymphocyte # : x  Auto Monocyte # : x  Auto Eosinophil # : x  Auto Basophil # : x  Auto Neutrophil % : x  Auto Lymphocyte % : x  Auto Monocyte % : x  Auto Eosinophil % : x  Auto Basophil % : x    10-14    129<L>  |  94<L>  |  25<H>  ----------------------------<  101<H>  3.8   |  24  |  1.30    Ca    8.8      14 Oct 2019 05:16  Phos  3.4     10-14  Mg     2.0     10-14    TPro  6.2  /  Alb  3.2<L>  /  TBili  1.4<H>  /  DBili  x   /  AST  43<H>  /  ALT  28  /  AlkPhos  134<H>  10-14    PT/INR - ( 13 Oct 2019 10:14 )   PT: 14.2 sec;   INR: 1.25          PTT - ( 13 Oct 2019 10:14 )  PTT:26.7 sec    The current medications were reviewed   MEDICATIONS  (STANDING):  allopurinol 300 milliGRAM(s) Oral two times a day  amiodarone    Tablet 400 milliGRAM(s) Oral every 8 hours  amiodarone    Tablet   Oral   aspirin enteric coated 81 milliGRAM(s) Oral daily  atorvastatin 80 milliGRAM(s) Oral at bedtime  buDESOnide    Inhalation Suspension 0.5 milliGRAM(s) Inhalation every 12 hours  chlorhexidine 2% Cloths 1 Application(s) Topical daily  clopidogrel Tablet 75 milliGRAM(s) Oral daily  docusate sodium 100 milliGRAM(s) Oral three times a day  furosemide   Injectable 20 milliGRAM(s) IV Push every 12 hours  heparin  Injectable 5000 Unit(s) SubCutaneous every 8 hours  metoprolol tartrate 25 milliGRAM(s) Oral every 6 hours  pantoprazole    Tablet 40 milliGRAM(s) Oral before breakfast  potassium chloride    Tablet ER 20 milliEquivalent(s) Oral daily  senna 1 Tablet(s) Oral at bedtime  sodium chloride 0.9%. 1000 milliLiter(s) (10 mL/Hr) IV Continuous <Continuous>  tamsulosin 0.4 milliGRAM(s) Oral at bedtime    MEDICATIONS  (PRN):  levalbuterol Inhalation 0.63 milliGRAM(s) Inhalation every 6 hours PRN SOB  oxyCODONE    IR 5 milliGRAM(s) Oral every 6 hours PRN Moderate Pain (4 - 6)  polyethylene glycol 3350 17 Gram(s) Oral daily PRN Constipation       PROBLEM LIST/ ASSESSMENT:  HEALTH ISSUES - PROBLEM Dx:  HLD (hyperlipidemia): HLD (hyperlipidemia)  HTN (hypertension): HTN (hypertension)  Renal artery stenosis: Renal artery stenosis  CAD (coronary artery disease): CAD (coronary artery disease)         Patient is a 79y old  Male who presents with a chief complaint of Chest pain (13 Oct 2019 19:52) s/p cabg        My plan includes :  -Close hemodynamic, ventilatory and drain monitoring and management per post op routine  -Monitor for arrhythmias and monitor parameters for organ perfusion  -Monitor neurologic status  -Head of the bed should remain elevated to 45 deg .   -Chest PT and IS will be encouraged  -Monitor adequacy of oxygenation and ventilation and attempt to wean oxygen  -Nutritional goals will be met using po eventually , ensure adequate caloric intake and monitor the same  -Stress ulcer and VTE prophylaxis will be achieved    -Glycemic control is satisfactory  -Electrolytes have been repleated as necessary and wound care has been carried out. Pain control has been achieved.   -Agressive physical therapy and early mobility and ambulation goals will be met   The family was updated about the course and plan    CRITICAL CARE TIME SPENT in evaluation and management, reassessments, review and interpretation of labs and x-rays, ventilator and hemodynamic management, formulating a plan and coordinating care: ___60____ MIN.  Time does not include procedural time.      CTICU ATTENDING:      		  Kenny Horn MD

## 2019-10-15 LAB
ALBUMIN SERPL ELPH-MCNC: 3.5 G/DL — SIGNIFICANT CHANGE UP (ref 3.3–5)
ALP SERPL-CCNC: 152 U/L — HIGH (ref 40–120)
ALT FLD-CCNC: 43 U/L — SIGNIFICANT CHANGE UP (ref 10–45)
ANION GAP SERPL CALC-SCNC: 11 MMOL/L — SIGNIFICANT CHANGE UP (ref 5–17)
ANION GAP SERPL CALC-SCNC: 15 MMOL/L — SIGNIFICANT CHANGE UP (ref 5–17)
APPEARANCE UR: ABNORMAL
AST SERPL-CCNC: 54 U/L — HIGH (ref 10–40)
BASOPHILS # BLD AUTO: 0 K/UL — SIGNIFICANT CHANGE UP (ref 0–0.2)
BASOPHILS NFR BLD AUTO: 0 % — SIGNIFICANT CHANGE UP (ref 0–2)
BILIRUB SERPL-MCNC: 1.4 MG/DL — HIGH (ref 0.2–1.2)
BILIRUB UR-MCNC: ABNORMAL
BUN SERPL-MCNC: 32 MG/DL — HIGH (ref 7–23)
BUN SERPL-MCNC: 33 MG/DL — HIGH (ref 7–23)
CALCIUM SERPL-MCNC: 8.4 MG/DL — SIGNIFICANT CHANGE UP (ref 8.4–10.5)
CALCIUM SERPL-MCNC: 8.9 MG/DL — SIGNIFICANT CHANGE UP (ref 8.4–10.5)
CHLORIDE SERPL-SCNC: 97 MMOL/L — SIGNIFICANT CHANGE UP (ref 96–108)
CHLORIDE SERPL-SCNC: 97 MMOL/L — SIGNIFICANT CHANGE UP (ref 96–108)
CK SERPL-CCNC: 247 U/L — HIGH (ref 30–200)
CO2 SERPL-SCNC: 23 MMOL/L — SIGNIFICANT CHANGE UP (ref 22–31)
CO2 SERPL-SCNC: 24 MMOL/L — SIGNIFICANT CHANGE UP (ref 22–31)
COLOR SPEC: ABNORMAL
CREAT SERPL-MCNC: 1.59 MG/DL — HIGH (ref 0.5–1.3)
CREAT SERPL-MCNC: 1.75 MG/DL — HIGH (ref 0.5–1.3)
DIFF PNL FLD: ABNORMAL
EOSINOPHIL # BLD AUTO: 0.39 K/UL — SIGNIFICANT CHANGE UP (ref 0–0.5)
EOSINOPHIL NFR BLD AUTO: 3 % — SIGNIFICANT CHANGE UP (ref 0–6)
GLUCOSE SERPL-MCNC: 101 MG/DL — HIGH (ref 70–99)
GLUCOSE SERPL-MCNC: 124 MG/DL — HIGH (ref 70–99)
GLUCOSE UR QL: 250
HCT VFR BLD CALC: 26.4 % — LOW (ref 39–50)
HCT VFR BLD CALC: 28.9 % — LOW (ref 39–50)
HGB BLD-MCNC: 8.7 G/DL — LOW (ref 13–17)
HGB BLD-MCNC: 9.5 G/DL — LOW (ref 13–17)
KETONES UR-MCNC: ABNORMAL MG/DL
LEUKOCYTE ESTERASE UR-ACNC: ABNORMAL
LYMPHOCYTES # BLD AUTO: 0.91 K/UL — LOW (ref 1–3.3)
LYMPHOCYTES # BLD AUTO: 7 % — LOW (ref 13–44)
MAGNESIUM SERPL-MCNC: 2.3 MG/DL — SIGNIFICANT CHANGE UP (ref 1.6–2.6)
MCHC RBC-ENTMCNC: 30 PG — SIGNIFICANT CHANGE UP (ref 27–34)
MCHC RBC-ENTMCNC: 30.7 PG — SIGNIFICANT CHANGE UP (ref 27–34)
MCHC RBC-ENTMCNC: 32.9 GM/DL — SIGNIFICANT CHANGE UP (ref 32–36)
MCHC RBC-ENTMCNC: 33 GM/DL — SIGNIFICANT CHANGE UP (ref 32–36)
MCV RBC AUTO: 91 FL — SIGNIFICANT CHANGE UP (ref 80–100)
MCV RBC AUTO: 93.5 FL — SIGNIFICANT CHANGE UP (ref 80–100)
MONOCYTES # BLD AUTO: 0.65 K/UL — SIGNIFICANT CHANGE UP (ref 0–0.9)
MONOCYTES NFR BLD AUTO: 5 % — SIGNIFICANT CHANGE UP (ref 2–14)
NEUTROPHILS # BLD AUTO: 10.69 K/UL — HIGH (ref 1.8–7.4)
NEUTROPHILS NFR BLD AUTO: 82 % — HIGH (ref 43–77)
NITRITE UR-MCNC: NEGATIVE — SIGNIFICANT CHANGE UP
NRBC # BLD: 0 /100 WBCS — SIGNIFICANT CHANGE UP (ref 0–0)
NRBC # BLD: SIGNIFICANT CHANGE UP /100 WBCS (ref 0–0)
PH UR: 7 — SIGNIFICANT CHANGE UP (ref 5–8)
PLATELET # BLD AUTO: 197 K/UL — SIGNIFICANT CHANGE UP (ref 150–400)
PLATELET # BLD AUTO: 239 K/UL — SIGNIFICANT CHANGE UP (ref 150–400)
POTASSIUM SERPL-MCNC: 3.8 MMOL/L — SIGNIFICANT CHANGE UP (ref 3.5–5.3)
POTASSIUM SERPL-MCNC: 4 MMOL/L — SIGNIFICANT CHANGE UP (ref 3.5–5.3)
POTASSIUM SERPL-SCNC: 3.8 MMOL/L — SIGNIFICANT CHANGE UP (ref 3.5–5.3)
POTASSIUM SERPL-SCNC: 4 MMOL/L — SIGNIFICANT CHANGE UP (ref 3.5–5.3)
PROT SERPL-MCNC: 6.6 G/DL — SIGNIFICANT CHANGE UP (ref 6–8.3)
PROT UR-MCNC: 100 MG/DL
RBC # BLD: 2.9 M/UL — LOW (ref 4.2–5.8)
RBC # BLD: 3.09 M/UL — LOW (ref 4.2–5.8)
RBC # FLD: 15.8 % — HIGH (ref 10.3–14.5)
RBC # FLD: 16 % — HIGH (ref 10.3–14.5)
SODIUM SERPL-SCNC: 132 MMOL/L — LOW (ref 135–145)
SODIUM SERPL-SCNC: 135 MMOL/L — SIGNIFICANT CHANGE UP (ref 135–145)
SP GR SPEC: 1.02 — SIGNIFICANT CHANGE UP (ref 1–1.03)
UROBILINOGEN FLD QL: 1 E.U./DL — SIGNIFICANT CHANGE UP
WBC # BLD: 13.04 K/UL — HIGH (ref 3.8–10.5)
WBC # BLD: 15.07 K/UL — HIGH (ref 3.8–10.5)
WBC # FLD AUTO: 13.04 K/UL — HIGH (ref 3.8–10.5)
WBC # FLD AUTO: 15.07 K/UL — HIGH (ref 3.8–10.5)

## 2019-10-15 PROCEDURE — 99233 SBSQ HOSP IP/OBS HIGH 50: CPT

## 2019-10-15 PROCEDURE — 71046 X-RAY EXAM CHEST 2 VIEWS: CPT | Mod: 26

## 2019-10-15 RX ORDER — FINASTERIDE 5 MG/1
5 TABLET, FILM COATED ORAL DAILY
Refills: 0 | Status: DISCONTINUED | OUTPATIENT
Start: 2019-10-15 | End: 2019-10-17

## 2019-10-15 RX ORDER — APIXABAN 2.5 MG/1
2.5 TABLET, FILM COATED ORAL
Refills: 0 | Status: DISCONTINUED | OUTPATIENT
Start: 2019-10-15 | End: 2019-10-16

## 2019-10-15 RX ORDER — ASCORBIC ACID 60 MG
500 TABLET,CHEWABLE ORAL DAILY
Refills: 0 | Status: DISCONTINUED | OUTPATIENT
Start: 2019-10-15 | End: 2019-10-17

## 2019-10-15 RX ORDER — FERROUS SULFATE 325(65) MG
325 TABLET ORAL DAILY
Refills: 0 | Status: DISCONTINUED | OUTPATIENT
Start: 2019-10-15 | End: 2019-10-17

## 2019-10-15 RX ADMIN — Medication 20 MILLIEQUIVALENT(S): at 12:12

## 2019-10-15 RX ADMIN — ATORVASTATIN CALCIUM 80 MILLIGRAM(S): 80 TABLET, FILM COATED ORAL at 22:42

## 2019-10-15 RX ADMIN — Medication 300 MILLIGRAM(S): at 06:20

## 2019-10-15 RX ADMIN — AMIODARONE HYDROCHLORIDE 400 MILLIGRAM(S): 400 TABLET ORAL at 06:17

## 2019-10-15 RX ADMIN — FINASTERIDE 5 MILLIGRAM(S): 5 TABLET, FILM COATED ORAL at 14:43

## 2019-10-15 RX ADMIN — Medication 325 MILLIGRAM(S): at 12:12

## 2019-10-15 RX ADMIN — Medication 0.5 MILLIGRAM(S): at 06:11

## 2019-10-15 RX ADMIN — SODIUM CHLORIDE 3 MILLILITER(S): 9 INJECTION INTRAMUSCULAR; INTRAVENOUS; SUBCUTANEOUS at 14:16

## 2019-10-15 RX ADMIN — Medication 25 MILLIGRAM(S): at 17:30

## 2019-10-15 RX ADMIN — Medication 300 MILLIGRAM(S): at 17:30

## 2019-10-15 RX ADMIN — Medication 20 MILLIGRAM(S): at 06:11

## 2019-10-15 RX ADMIN — HEPARIN SODIUM 5000 UNIT(S): 5000 INJECTION INTRAVENOUS; SUBCUTANEOUS at 06:11

## 2019-10-15 RX ADMIN — Medication 81 MILLIGRAM(S): at 12:12

## 2019-10-15 RX ADMIN — SODIUM CHLORIDE 3 MILLILITER(S): 9 INJECTION INTRAMUSCULAR; INTRAVENOUS; SUBCUTANEOUS at 05:40

## 2019-10-15 RX ADMIN — PANTOPRAZOLE SODIUM 40 MILLIGRAM(S): 20 TABLET, DELAYED RELEASE ORAL at 06:11

## 2019-10-15 RX ADMIN — SODIUM CHLORIDE 3 MILLILITER(S): 9 INJECTION INTRAMUSCULAR; INTRAVENOUS; SUBCUTANEOUS at 22:44

## 2019-10-15 RX ADMIN — TAMSULOSIN HYDROCHLORIDE 0.4 MILLIGRAM(S): 0.4 CAPSULE ORAL at 22:42

## 2019-10-15 RX ADMIN — Medication 25 MILLIGRAM(S): at 12:12

## 2019-10-15 RX ADMIN — APIXABAN 2.5 MILLIGRAM(S): 2.5 TABLET, FILM COATED ORAL at 17:30

## 2019-10-15 RX ADMIN — Medication 500 MILLIGRAM(S): at 12:12

## 2019-10-15 RX ADMIN — Medication 100 MILLIGRAM(S): at 22:42

## 2019-10-15 NOTE — PROGRESS NOTE ADULT - SUBJECTIVE AND OBJECTIVE BOX
CTICU  CRITICAL  CARE  attending     Hand off received 					   Pertinent clinical, laboratory, radiographic, hemodynamic, echocardiographic, respiratory data, microbiologic data and chart were reviewed and analyzed frequently throughout the course of the day and night  Patient seen and examined with CTS/ SH attending at bedside  Pt is a 79y , Male, HEALTH ISSUES - PROBLEM Dx:  HLD (hyperlipidemia): HLD (hyperlipidemia)  HTN (hypertension): HTN (hypertension)  Renal artery stenosis: Renal artery stenosis  CAD (coronary artery disease): CAD (coronary artery disease)      , FAMILY HISTORY:  PAST MEDICAL & SURGICAL HISTORY:  Gout  HLD (hyperlipidemia)  HTN (hypertension)  Renal artery stenosis  CAD (coronary artery disease)  H/O renal artery stenosis: s/p stent  S/P coronary artery stent placement    Patient is a 79y old  Male who presents with a chief complaint of Chest pain (15 Oct 2019 12:38)      14 system review was unremarkable    Vital signs, hemodynamic and respiratory parameters were reviewed from the bedside nursing flowsheet.  ICU Vital Signs Last 24 Hrs  T(C): 37.1 (15 Oct 2019 22:01), Max: 37.4 (15 Oct 2019 17:52)  T(F): 98.7 (15 Oct 2019 22:01), Max: 99.3 (15 Oct 2019 17:52)  HR: 64 (15 Oct 2019 17:30) (58 - 66)  BP: 147/64 (15 Oct 2019 17:30) (118/55 - 147/64)  BP(mean): 107 (15 Oct 2019 17:30) (68 - 107)  ABP: --  ABP(mean): --  RR: 17 (15 Oct 2019 17:30) (16 - 22)  SpO2: 96% (15 Oct 2019 17:30) (96% - 98%)    Adult Advanced Hemodynamics Last 24 Hrs  CVP(mm Hg): --  CVP(cm H2O): --  CO: --  CI: --  PA: --  PA(mean): --  PCWP: --  SVR: --  SVRI: --  PVR: --  PVRI: --,     Intake and output was reviewed and the fluid balance was calculated  Daily     Daily   I&O's Summary    14 Oct 2019 07:01  -  15 Oct 2019 07:00  --------------------------------------------------------  IN: 120 mL / OUT: 1060 mL / NET: -940 mL    15 Oct 2019 07:01  -  15 Oct 2019 23:50  --------------------------------------------------------  IN: 250 mL / OUT: 1300 mL / NET: -1050 mL        All lines and drain sites were assessed  Glycemic trend was reviewedCAPILLARY BLOOD GLUCOSE        No acute change in mental status  Auscultation of the chest reveals equal bs  Abdomen is soft  Extremities are warm and well perfused  Wounds appear clean and unremarkable  Antibiotics are periop    labs  CBC Full  -  ( 15 Oct 2019 15:19 )  WBC Count : 15.07 K/uL  RBC Count : 3.09 M/uL  Hemoglobin : 9.5 g/dL  Hematocrit : 28.9 %  Platelet Count - Automated : 239 K/uL  Mean Cell Volume : 93.5 fl  Mean Cell Hemoglobin : 30.7 pg  Mean Cell Hemoglobin Concentration : 32.9 gm/dL  Auto Neutrophil # : x  Auto Lymphocyte # : x  Auto Monocyte # : x  Auto Eosinophil # : x  Auto Basophil # : x  Auto Neutrophil % : x  Auto Lymphocyte % : x  Auto Monocyte % : x  Auto Eosinophil % : x  Auto Basophil % : x    10-15    135  |  97  |  33<H>  ----------------------------<  124<H>  4.0   |  23  |  1.75<H>    Ca    8.9      15 Oct 2019 15:19  Phos  4.0     10-14  Mg     2.3     10-15    TPro  6.6  /  Alb  3.5  /  TBili  1.4<H>  /  DBili  x   /  AST  54<H>  /  ALT  43  /  AlkPhos  152<H>  10-15    PT/INR - ( 14 Oct 2019 14:58 )   PT: 12.9 sec;   INR: 1.14          PTT - ( 14 Oct 2019 14:58 )  PTT:28.6 sec  The current medications were reviewed   MEDICATIONS  (STANDING):  allopurinol 300 milliGRAM(s) Oral two times a day  amiodarone    Tablet 200 milliGRAM(s) Oral daily  amiodarone    Tablet   Oral   apixaban 2.5 milliGRAM(s) Oral two times a day  ascorbic acid 500 milliGRAM(s) Oral daily  aspirin enteric coated 81 milliGRAM(s) Oral daily  atorvastatin 80 milliGRAM(s) Oral at bedtime  buDESOnide    Inhalation Suspension 0.5 milliGRAM(s) Inhalation every 12 hours  docusate sodium 100 milliGRAM(s) Oral three times a day  ferrous    sulfate 325 milliGRAM(s) Oral daily  finasteride 5 milliGRAM(s) Oral daily  metoprolol tartrate 25 milliGRAM(s) Oral every 6 hours  pantoprazole    Tablet 40 milliGRAM(s) Oral before breakfast  senna 1 Tablet(s) Oral at bedtime  sodium chloride 0.9% lock flush 3 milliLiter(s) IV Push every 8 hours  tamsulosin 0.4 milliGRAM(s) Oral at bedtime    MEDICATIONS  (PRN):  levalbuterol Inhalation 0.63 milliGRAM(s) Inhalation every 6 hours PRN SOB  oxyCODONE    IR 5 milliGRAM(s) Oral every 6 hours PRN Moderate Pain (4 - 6)  polyethylene glycol 3350 17 Gram(s) Oral daily PRN Constipation       PROBLEM LIST/ ASSESSMENT:  HEALTH ISSUES - PROBLEM Dx:  HLD (hyperlipidemia): HLD (hyperlipidemia)  HTN (hypertension): HTN (hypertension)  Renal artery stenosis: Renal artery stenosis  CAD (coronary artery disease): CAD (coronary artery disease)      ,   Patient is a 79y old  Male who presents with a chief complaint of Chest pain (15 Oct 2019 12:38)     s/p cardiac surgery          My plan includes :  close hemodynamic, ventilatory and drain monitoring and management per post op routine    Monitor for arrhythmias and monitor parameters for organ perfusion  beta blockade not administered due to hemodynamic instability and bradycardia  monitor neurologic status  Head of the bed should remain elevated to 45 deg .   chest PT and IS will be encouraged  monitor adequacy of oxygenation and ventilation and attempt to wean oxygen  antibiotic regimen will be tailored to the clinical, laboratory and microbiologic data  Nutritional goals will be met using po eventually , ensure adequate caloric intake and montior the same  Stress ulcer and VTE prophylaxis will be achieved    Glycemic control is satisfactory  Electrolytes have been repleted as necessary and wound care has been carried out. Pain control has been achieved.   agressive physical therapy and early mobility and ambulation goals will be met   The family was updated about the course and plan  CRITICAL CARE TIME personally provided by me  in evaluation and management, reassessments, review and interpretation of labs and x-rays, ventilator and hemodynamic management, formulating a plan and coordinating care: ___90____ MIN.  Time does not include procedural time.  CTICU ATTENDING     					    Lm Dunne MD

## 2019-10-15 NOTE — PROGRESS NOTE ADULT - SUBJECTIVE AND OBJECTIVE BOX
Patient discussed on morning rounds with Dr. Anaya.    Operation/Date: 10/8/2019 - OP CABG x 2 (lima-lad, svg-om). EF 45%  10/8 RTOR reexploration for bleeding and tamponade    SUBJECTIVE ASSESSMENT:  79y Male seen and examined out of bed in chair. Patient feeling well with no acute complaints. He is ambulating with assistance and feels he is getting stronger. +BMs yesterday. Voiding urine with residual 300cc on bladder scan.     Vital Signs Last 24 Hrs  T(C): 36.8 (15 Oct 2019 08:25), Max: 36.9 (15 Oct 2019 05:00)  T(F): 98.3 (15 Oct 2019 08:25), Max: 98.5 (15 Oct 2019 05:00)  HR: 64 (15 Oct 2019 12:15) (58 - 66)  BP: 128/62 (15 Oct 2019 12:15) (112/75 - 146/51)  BP(mean): 97 (15 Oct 2019 12:15) (68 - 99)  RR: 17 (15 Oct 2019 12:15) (16 - 37)  SpO2: 96% (15 Oct 2019 12:15) (93% - 98%)    I&O's Detail    14 Oct 2019 07:01  -  15 Oct 2019 07:00  --------------------------------------------------------  IN:    Oral Fluid: 120 mL  Total IN: 120 mL    OUT:    Voided: 1060 mL  Total OUT: 1060 mL    Total NET: -940 mL      15 Oct 2019 07:01  -  15 Oct 2019 12:44  --------------------------------------------------------  IN:  Total IN: 0 mL    OUT:    Voided: 200 mL  Total OUT: 200 mL    Total NET: -200 mL    CHEST TUBE: No  KELLIE DRAIN: No  EPICARDIAL WIRES: Yes  TIE DOWNS: Yes  LIRIANO: No    PHYSICAL EXAM:    General:    Neurological:    Cardiovascular:    Respiratory:    Gastrointestinal:    Extremities:    Vascular:    Incision Sites:    LABS                        8.7    13.04 )-----------( 197      ( 15 Oct 2019 05:53 )             26.4     COUMADIN: No.    PT/INR - ( 14 Oct 2019 14:58 )   PT: 12.9 sec;   INR: 1.14          PTT - ( 14 Oct 2019 14:58 )  PTT:28.6 sec    10-15    132<L>  |  97  |  32<H>  ----------------------------<  101<H>  3.8   |  24  |  1.59<H>    Ca    8.4      15 Oct 2019 05:53  Phos  4.0     10-14  Mg     2.3     10-15    TPro  7.0  /  Alb  3.8  /  TBili  1.3<H>  /  DBili  x   /  AST  46<H>  /  ALT  34  /  AlkPhos  149<H>  10-14    MEDICATIONS  (STANDING):  allopurinol 300 milliGRAM(s) Oral two times a day  amiodarone Tablet 200 milliGRAM(s) Oral daily  apixaban 2.5 milliGRAM(s) Oral two times a day  ascorbic acid 500 milliGRAM(s) Oral daily  aspirin enteric coated 81 milliGRAM(s) Oral daily  atorvastatin 80 milliGRAM(s) Oral at bedtime  buDESOnide Inhalation Suspension 0.5 milliGRAM(s) Inhalation every 12 hours  docusate sodium 100 milliGRAM(s) Oral three times a day  ferrous sulfate 325 milliGRAM(s) Oral daily  furosemide   Injectable 20 milliGRAM(s) IV Push every 12 hours  metoprolol tartrate 25 milliGRAM(s) Oral every 6 hours  pantoprazole    Tablet 40 milliGRAM(s) Oral before breakfast  potassium chloride    Tablet ER 20 milliEquivalent(s) Oral daily  senna 1 Tablet(s) Oral at bedtime  sodium chloride 0.9% lock flush 3 milliLiter(s) IV Push every 8 hours  tamsulosin 0.4 milliGRAM(s) Oral at bedtime    MEDICATIONS  (PRN):  levalbuterol Inhalation 0.63 milliGRAM(s) Inhalation every 6 hours PRN SOB  oxyCODONE IR 5 milliGRAM(s) Oral every 6 hours PRN Moderate Pain (4 - 6)  polyethylene glycol 3350 17 Gram(s) Oral daily PRN Constipation    RADIOLOGY & ADDITIONAL TESTS: Patient discussed on morning rounds with Dr. Anaya.    Operation/Date: 10/8/2019 - OP CABG x 2 (lima-lad, svg-om). EF 45%  10/8 RTOR reexploration for bleeding and tamponade    SUBJECTIVE ASSESSMENT:  79y Male seen and examined out of bed in chair. Patient feeling well with no acute complaints. He is ambulating with assistance and feels he is getting stronger. +BMs yesterday. Voiding urine with residual 300cc on bladder scan, continuing to monitor periodically during the day.     Vital Signs Last 24 Hrs  T(C): 36.8 (15 Oct 2019 08:25), Max: 36.9 (15 Oct 2019 05:00)  T(F): 98.3 (15 Oct 2019 08:25), Max: 98.5 (15 Oct 2019 05:00)  HR: 64 (15 Oct 2019 12:15) (58 - 66)  BP: 128/62 (15 Oct 2019 12:15) (112/75 - 146/51)  BP(mean): 97 (15 Oct 2019 12:15) (68 - 99)  RR: 17 (15 Oct 2019 12:15) (16 - 37)  SpO2: 96% (15 Oct 2019 12:15) (93% - 98%)    I&O's Detail    14 Oct 2019 07:01  -  15 Oct 2019 07:00  --------------------------------------------------------  IN:    Oral Fluid: 120 mL  Total IN: 120 mL    OUT:    Voided: 1060 mL  Total OUT: 1060 mL    Total NET: -940 mL      15 Oct 2019 07:01  -  15 Oct 2019 12:44  --------------------------------------------------------  IN:  Total IN: 0 mL    OUT:    Voided: 200 mL  Total OUT: 200 mL    Total NET: -200 mL    CHEST TUBE: No  KELLIE DRAIN: No  EPICARDIAL WIRES: Yes  TIE DOWNS: Yes  LIRIANO: No    PHYSICAL EXAM:    General: NAD, sitting upright in chair     Neurological: moving all extremities with no focal deficits, A&Ox3     Cardiovascular: RRR, no mr/g    Respiratory: CTA b/l     Gastrointestinal: NT-ND, soft     Extremities: warm and well perfused, +swelling and edema to RLE EVH extremity    Vascular: +2 radial b/l     Incision Sites: stetrnotomy healing well CDI, no erythema, ecchymosis or drainage    LABS                        8.7    13.04 )-----------( 197      ( 15 Oct 2019 05:53 )             26.4     COUMADIN: No.    PT/INR - ( 14 Oct 2019 14:58 )   PT: 12.9 sec;   INR: 1.14          PTT - ( 14 Oct 2019 14:58 )  PTT:28.6 sec    10-15    132<L>  |  97  |  32<H>  ----------------------------<  101<H>  3.8   |  24  |  1.59<H>    Ca    8.4      15 Oct 2019 05:53  Phos  4.0     10-14  Mg     2.3     10-15    TPro  7.0  /  Alb  3.8  /  TBili  1.3<H>  /  DBili  x   /  AST  46<H>  /  ALT  34  /  AlkPhos  149<H>  10-14    MEDICATIONS  (STANDING):  allopurinol 300 milliGRAM(s) Oral two times a day  amiodarone Tablet 200 milliGRAM(s) Oral daily  apixaban 2.5 milliGRAM(s) Oral two times a day  ascorbic acid 500 milliGRAM(s) Oral daily  aspirin enteric coated 81 milliGRAM(s) Oral daily  atorvastatin 80 milliGRAM(s) Oral at bedtime  buDESOnide Inhalation Suspension 0.5 milliGRAM(s) Inhalation every 12 hours  docusate sodium 100 milliGRAM(s) Oral three times a day  ferrous sulfate 325 milliGRAM(s) Oral daily  furosemide   Injectable 20 milliGRAM(s) IV Push every 12 hours  metoprolol tartrate 25 milliGRAM(s) Oral every 6 hours  pantoprazole    Tablet 40 milliGRAM(s) Oral before breakfast  potassium chloride    Tablet ER 20 milliEquivalent(s) Oral daily  senna 1 Tablet(s) Oral at bedtime  sodium chloride 0.9% lock flush 3 milliLiter(s) IV Push every 8 hours  tamsulosin 0.4 milliGRAM(s) Oral at bedtime    MEDICATIONS  (PRN):  levalbuterol Inhalation 0.63 milliGRAM(s) Inhalation every 6 hours PRN SOB  oxyCODONE IR 5 milliGRAM(s) Oral every 6 hours PRN Moderate Pain (4 - 6)  polyethylene glycol 3350 17 Gram(s) Oral daily PRN Constipation    RADIOLOGY & ADDITIONAL TESTS:   CXR: < from: Xray Chest 1 View- PORTABLE-Routine (10.14.19 @ 04:51) >  Impression:    Similar appearance to prior exam 10/13/2019. Minimal focal atelectasis.   Left chest wall subcutaneous emphysema. No pneumothorax. No acute   infiltrate. No significant pleural effusion. No acute bone abnormality.    < end of copied text >

## 2019-10-15 NOTE — PROGRESS NOTE ADULT - ASSESSMENT
This is a 79 year old male, former smoker, with history of HTN, HLD, renal artery stenosis s/p left renal stent in 2011, CAD s/p MI with thrombectomy and PES (ROBERTA to pLAD and PTCA/ROBERTA to mLAD in 6/2010 with ROBERTA to LCx with tandemheart/LVAD a week later on 6/21/2010 who presented to [facility] ED on 10/7/19 with substernal chest pain/pressure, admitted to r/o ACS.  Subsequent cardiac cath showing LM normal, 99% mLAD, pLCx 100% with L-L collaterals, mRCA 100% with R-R collaterals.  IABP was placed and he was admitted to CCU for further evaluation and management.  CTS consulted for surgical evaluation and on 10/8/19 he underwent OPCAB x 2, post op EF 45%.  Intraop urology consulted for difficult clark placement.  Noted to have enlarged prostate with large amount of urine draining upon clark insertion.  Post op he was transferred to the CTICU and later returned to the OR on POD 0 for bleeding and tamponade.  Hemostasis achieved and patient received 3 u PRBC and 2 FFP.  POD 1 IABP was removed and he was later extubated.  POD 2 small bilateral pneumothorax post CT removal that enlarged on POD 3 requiring pigtail placement.  Pigtails later removed on POD 5.  Clark removed on POD 5, patient passed TOV but only voiding small amounts with high PVRs.  Patient remains asymptomatic and refused clark placement. POD 6 patient moved from ICU to telemetry. Today, POD 7 on telemetry, still with large residual urine volumes, ambulating down hallway with assistance.    Neurovascular: No delirium. Pain well controlled with current regimen.  -con't oxycodone prn.    Cardiovascular: Hemodynamically stable. HR controlled.  -con't lopressor, asa, plavix, and statin   -con't amio load for post op paroxysmal afib started on POD 2, last in afib yesterday morning, currently in sinus.    -con't eliquis      Respiratory: Saturating well on 3 L NC.  -If on oxygen wean to RA from for O2 Sat > 93%.  -Encourage C+DB and Use of IS 10x / hr while awake.  -PA/lat in am    GI: Stable, +BM post op  -PPX with protonix  -PO Diet.    Renal / : enlarged prostate with difficult clark placement intraop.    -urology following  -con't flomax  -urinating small amounts with high PVRs.  Currently refusing clark and asymptomatic, urology aware  -will con't to monitor closely     Endocrine:    -A1c 4.9    Hematologic:  -possible start eliquis for a/c 2/2 afib     ID:  -afebrile     Prophylaxis:  -DVT prophylaxis with 5000 SubQ Heparin q8h.  -SCD's    Disposition:  -home when medically ready This is a 79 year old male, former smoker, with history of HTN, HLD, renal artery stenosis s/p left renal stent in 2011, CAD s/p MI with thrombectomy and PES (ROBERTA to pLAD and PTCA/ROBERTA to mLAD in 6/2010 with ROBERTA to LCx with tandemheart/LVAD a week later on 6/21/2010 who presented to [facility] ED on 10/7/19 with substernal chest pain/pressure, admitted to r/o ACS.  Subsequent cardiac cath showing LM normal, 99% mLAD, pLCx 100% with L-L collaterals, mRCA 100% with R-R collaterals.  IABP was placed and he was admitted to CCU for further evaluation and management.  CTS consulted for surgical evaluation and on 10/8/19 he underwent OPCAB x 2, post op EF 45%.  Intraop urology consulted for difficult clark placement.  Noted to have enlarged prostate with large amount of urine draining upon clark insertion.  Post op he was transferred to the CTICU and later returned to the OR on POD 0 for bleeding and tamponade.  Hemostasis achieved and patient received 3 u PRBC and 2 FFP.  POD 1 IABP was removed and he was later extubated.  POD 2 small bilateral pneumothorax post CT removal that enlarged on POD 3 requiring pigtail placement.  Pigtails later removed on POD 5.  Clark removed on POD 5, patient passed TOV but only voiding small amounts with high PVRs.  Patient remains asymptomatic and refused clark placement. POD 6 patient moved from ICU to telemetry. Today, POD 7 on telemetry, still with large residual urine volumes, ambulating down hallway with assistance.    Neurovascular: No delirium. Pain well controlled with current regimen.  -con't oxycodone prn.    Cardiovascular: Hemodynamically stable. HR controlled.  -con't lopressor, asa, plavix, and statin   -con't amio load for post op paroxysmal afib started on POD 2, last in afib yesterday morning, currently in sinus.    -start eliquis today, scheduled first dose at 6PM      Respiratory: Saturating well on 3 L NC.  - weaned to room air, O2 sat 96-98%  -Encourage C+DB and Use of IS 10x / hr while awake.  -PA/lat in am    GI: Stable, +BM post op  -PPX with protonix  -PO Diet.    Renal / : enlarged prostate with difficult clark placement intraop.    -urology following  -con't flomax  -urinating small amounts with high PVRs.  Currently refusing clark and asymptomatic, urology aware  -will con't to monitor closely     Endocrine:    -A1c 4.9    Hematologic:  -Start eliquis today for a/c 2/2 afib     ID:  -afebrile     Prophylaxis:  -DVT prophylaxis with 5000 SubQ Heparin q8h.  -SCD's    Disposition:  -home when medically ready This is a 79 year old male, former smoker, with history of HTN, HLD, renal artery stenosis s/p left renal stent in 2011, CAD s/p MI with thrombectomy and PES (ROBERTA to pLAD and PTCA/ROBERTA to mLAD in 6/2010 with ROBERTA to LCx with tandemheart/LVAD a week later on 6/21/2010 who presented to [facility] ED on 10/7/19 with substernal chest pain/pressure, admitted to r/o ACS.  Subsequent cardiac cath showing LM normal, 99% mLAD, pLCx 100% with L-L collaterals, mRCA 100% with R-R collaterals.  IABP was placed and he was admitted to CCU for further evaluation and management.  CTS consulted for surgical evaluation and on 10/8/19 he underwent OPCAB x 2, post op EF 45%.  Intraop urology consulted for difficult clark placement.  Noted to have enlarged prostate with large amount of urine draining upon clark insertion.  Post op he was transferred to the CTICU and later returned to the OR on POD 0 for bleeding and tamponade.  Hemostasis achieved and patient received 3 u PRBC and 2 FFP.  POD 1 IABP was removed and he was later extubated.  POD 2 small bilateral pneumothorax post CT removal that enlarged on POD 3 requiring pigtail placement.  Pigtails later removed on POD 5.  Clark removed on POD 5, patient passed TOV but only voiding small amounts with high PVRs.  Patient remains asymptomatic and refused clark placement. POD 6 patient moved from ICU to telemetry. Today, POD 7 on telemetry, still with large residual urine volumes, ambulating down hallway with minimal assistance, +BMs.     Neurovascular: No delirium. Pain well controlled with current regimen.  -con't oxycodone prn, holding Tylenol for mildly elevated LFTs    Cardiovascular: Hemodynamically stable. HR controlled. POD#7 from OPCAB post-op c/b AF   -con't lopressor 25mg PO q6hrs, asa, and statin   -con't amio load for post op paroxysmal afib on eliquis/BB and amio, currently in sinus.    -As per Dr. Anaya, plan for ASA and Eliquis (modified dose 2/2 elevated Cr)  -E EV site- +swelling will continue to monitor doppler pulse and ACE wrap for swelling       Respiratory: Saturating well on RA, post-op c/b b/l PTX s/p b/l pigtails now removed   - weaned to room air, O2 sat 96-98%  -Encourage C+DB and Use of IS 10x / hr while awake.  -PA/lat this AM no acute pathology  -continue nebulizers    GI: Stable, +BM post op  -PPX with protonix  -PO Diet.    Renal / : enlarged prostate with difficult clark placement intraop, clark now out with high post void residuals. post-op also c/b JOHANNA continue to monitor   -con't flomax, started proscar  -urinating small amounts with high PVRs.  Currently refusing clark and asymptomatic, urology aware  -will con't to monitor closely     Endocrine:  no h/o DM or thyroid disease   -A1c 4.9  -TSH- 2.2    Hematologic: post-op anemia given PRBC.   -H/H: 8.7/26.4- started Fe and Vitamin C   -Started eliquis today for a/c 2/2 afib   -f/u 2PM labs     ID: no acute issues   -afebrile     Prophylaxis:  -DVT prophylaxis with eliquis  -SCD's    Disposition:  -home when medically ready

## 2019-10-16 LAB
ALBUMIN SERPL ELPH-MCNC: 3.2 G/DL — LOW (ref 3.3–5)
ALP SERPL-CCNC: 137 U/L — HIGH (ref 40–120)
ALT FLD-CCNC: 37 U/L — SIGNIFICANT CHANGE UP (ref 10–45)
ANION GAP SERPL CALC-SCNC: 12 MMOL/L — SIGNIFICANT CHANGE UP (ref 5–17)
APTT BLD: 30 SEC — SIGNIFICANT CHANGE UP (ref 27.5–36.3)
AST SERPL-CCNC: 43 U/L — HIGH (ref 10–40)
BILIRUB SERPL-MCNC: 1.6 MG/DL — HIGH (ref 0.2–1.2)
BUN SERPL-MCNC: 30 MG/DL — HIGH (ref 7–23)
CALCIUM SERPL-MCNC: 8.4 MG/DL — SIGNIFICANT CHANGE UP (ref 8.4–10.5)
CHLORIDE SERPL-SCNC: 98 MMOL/L — SIGNIFICANT CHANGE UP (ref 96–108)
CK SERPL-CCNC: 180 U/L — SIGNIFICANT CHANGE UP (ref 30–200)
CO2 SERPL-SCNC: 24 MMOL/L — SIGNIFICANT CHANGE UP (ref 22–31)
CREAT SERPL-MCNC: 1.63 MG/DL — HIGH (ref 0.5–1.3)
GLUCOSE SERPL-MCNC: 104 MG/DL — HIGH (ref 70–99)
HCT VFR BLD CALC: 27.7 % — LOW (ref 39–50)
HGB BLD-MCNC: 8.9 G/DL — LOW (ref 13–17)
INR BLD: 1.46 — HIGH (ref 0.88–1.16)
MAGNESIUM SERPL-MCNC: 2 MG/DL — SIGNIFICANT CHANGE UP (ref 1.6–2.6)
MCHC RBC-ENTMCNC: 30 PG — SIGNIFICANT CHANGE UP (ref 27–34)
MCHC RBC-ENTMCNC: 32.1 GM/DL — SIGNIFICANT CHANGE UP (ref 32–36)
MCV RBC AUTO: 93.3 FL — SIGNIFICANT CHANGE UP (ref 80–100)
NRBC # BLD: 0 /100 WBCS — SIGNIFICANT CHANGE UP (ref 0–0)
PLATELET # BLD AUTO: 228 K/UL — SIGNIFICANT CHANGE UP (ref 150–400)
POTASSIUM SERPL-MCNC: 3.8 MMOL/L — SIGNIFICANT CHANGE UP (ref 3.5–5.3)
POTASSIUM SERPL-SCNC: 3.8 MMOL/L — SIGNIFICANT CHANGE UP (ref 3.5–5.3)
PROT SERPL-MCNC: 6 G/DL — SIGNIFICANT CHANGE UP (ref 6–8.3)
PROTHROM AB SERPL-ACNC: 16.7 SEC — HIGH (ref 10–12.9)
RBC # BLD: 2.97 M/UL — LOW (ref 4.2–5.8)
RBC # FLD: 15.9 % — HIGH (ref 10.3–14.5)
SODIUM SERPL-SCNC: 134 MMOL/L — LOW (ref 135–145)
WBC # BLD: 13.32 K/UL — HIGH (ref 3.8–10.5)
WBC # FLD AUTO: 13.32 K/UL — HIGH (ref 3.8–10.5)

## 2019-10-16 PROCEDURE — 99233 SBSQ HOSP IP/OBS HIGH 50: CPT

## 2019-10-16 RX ORDER — TAMSULOSIN HYDROCHLORIDE 0.4 MG/1
0.4 CAPSULE ORAL ONCE
Refills: 0 | Status: DISCONTINUED | OUTPATIENT
Start: 2019-10-16 | End: 2019-10-16

## 2019-10-16 RX ORDER — HEPARIN SODIUM 5000 [USP'U]/ML
5000 INJECTION INTRAVENOUS; SUBCUTANEOUS EVERY 8 HOURS
Refills: 0 | Status: DISCONTINUED | OUTPATIENT
Start: 2019-10-16 | End: 2019-10-17

## 2019-10-16 RX ORDER — TAMSULOSIN HYDROCHLORIDE 0.4 MG/1
0.4 CAPSULE ORAL EVERY 12 HOURS
Refills: 0 | Status: DISCONTINUED | OUTPATIENT
Start: 2019-10-16 | End: 2019-10-17

## 2019-10-16 RX ADMIN — Medication 0.5 MILLIGRAM(S): at 18:43

## 2019-10-16 RX ADMIN — Medication 300 MILLIGRAM(S): at 18:43

## 2019-10-16 RX ADMIN — FINASTERIDE 5 MILLIGRAM(S): 5 TABLET, FILM COATED ORAL at 12:26

## 2019-10-16 RX ADMIN — Medication 100 MILLIGRAM(S): at 14:49

## 2019-10-16 RX ADMIN — TAMSULOSIN HYDROCHLORIDE 0.4 MILLIGRAM(S): 0.4 CAPSULE ORAL at 18:43

## 2019-10-16 RX ADMIN — SODIUM CHLORIDE 3 MILLILITER(S): 9 INJECTION INTRAMUSCULAR; INTRAVENOUS; SUBCUTANEOUS at 14:50

## 2019-10-16 RX ADMIN — HEPARIN SODIUM 5000 UNIT(S): 5000 INJECTION INTRAVENOUS; SUBCUTANEOUS at 22:20

## 2019-10-16 RX ADMIN — TAMSULOSIN HYDROCHLORIDE 0.4 MILLIGRAM(S): 0.4 CAPSULE ORAL at 09:03

## 2019-10-16 RX ADMIN — Medication 81 MILLIGRAM(S): at 12:26

## 2019-10-16 RX ADMIN — Medication 325 MILLIGRAM(S): at 12:26

## 2019-10-16 RX ADMIN — AMIODARONE HYDROCHLORIDE 200 MILLIGRAM(S): 400 TABLET ORAL at 05:35

## 2019-10-16 RX ADMIN — Medication 25 MILLIGRAM(S): at 05:34

## 2019-10-16 RX ADMIN — Medication 0.5 MILLIGRAM(S): at 05:35

## 2019-10-16 RX ADMIN — Medication 100 MILLIGRAM(S): at 05:36

## 2019-10-16 RX ADMIN — HEPARIN SODIUM 5000 UNIT(S): 5000 INJECTION INTRAVENOUS; SUBCUTANEOUS at 14:49

## 2019-10-16 RX ADMIN — Medication 25 MILLIGRAM(S): at 00:20

## 2019-10-16 RX ADMIN — Medication 500 MILLIGRAM(S): at 12:26

## 2019-10-16 RX ADMIN — SODIUM CHLORIDE 3 MILLILITER(S): 9 INJECTION INTRAMUSCULAR; INTRAVENOUS; SUBCUTANEOUS at 05:27

## 2019-10-16 RX ADMIN — APIXABAN 2.5 MILLIGRAM(S): 2.5 TABLET, FILM COATED ORAL at 05:36

## 2019-10-16 RX ADMIN — ATORVASTATIN CALCIUM 80 MILLIGRAM(S): 80 TABLET, FILM COATED ORAL at 22:21

## 2019-10-16 RX ADMIN — PANTOPRAZOLE SODIUM 40 MILLIGRAM(S): 20 TABLET, DELAYED RELEASE ORAL at 05:36

## 2019-10-16 RX ADMIN — SODIUM CHLORIDE 3 MILLILITER(S): 9 INJECTION INTRAMUSCULAR; INTRAVENOUS; SUBCUTANEOUS at 21:45

## 2019-10-16 RX ADMIN — Medication 300 MILLIGRAM(S): at 07:33

## 2019-10-16 RX ADMIN — Medication 25 MILLIGRAM(S): at 18:43

## 2019-10-16 NOTE — PROGRESS NOTE ADULT - ASSESSMENT
Assessment: This is a 79 year old male, former smoker, with history of HTN, HLD, renal artery stenosis s/p left renal stent in 2011, CAD s/p MI with thrombectomy and PES (ROBERTA to pLAD and PTCA/ROBERTA to mLAD in 6/2010 with ROBERTA to LCx with tandemheart/LVAD a week later on 6/21/2010 who presented to [facility] ED on 10/7/19 with substernal chest pain/pressure, admitted to r/o ACS.  Subsequent cardiac cath showing LM normal, 99% mLAD, pLCx 100% with L-L collaterals, mRCA 100% with R-R collaterals.  IABP was placed and he was admitted to CCU for further evaluation and management.  CTS consulted for surgical evaluation and on 10/8/19 he underwent OPCAB x 2, post op EF 45%.  Intraop urology consulted for difficult clark placement.  Noted to have enlarged prostate with large amount of urine draining upon clark insertion.  Post op he was transferred to the CTICU and later returned to the OR on POD 0 for bleeding and tamponade.  Hemostasis achieved and patient received 3 u PRBC and 2 FFP.  POD#1 IABP was removed and he was later extubated.  POD#2 small bilateral pneumothorax post CT removal that enlarged on POD#3 requiring pigtail placement.  Pigtails later removed on POD#5.  Clark removed on POD#5, patient passed TOV but only voiding small amounts with high PVRs.  Patient remains asymptomatic and refused clark placement. POD#6 patient moved from ICU to telemetry. POD#7, still with large residual urine volumes,  replaced clark. POD#8, today, clark in place, flomax increased possible TOV tomorrow.     Neurovascular: No delirium. Pain well controlled with current regimen.  -con't oxycodone prn, holding Tylenol for mildly elevated LFTs (improving slowly)    Cardiovascular: Hemodynamically stable. HR controlled. POD#8 from OPCAB post-op c/b AF   -con't lopressor 25mg PO q6hrs, asa, and statin   -con't amio load for post op paroxysmal afib was eliquis/BB and amio, currently in sinus.  Pt has been in NSR for >48hrs and as a result no need for A/C as per Dr. Anaya.  -RLE EVH site- +swelling will continue to monitor doppler pulse and ACE wrap for swelling       Respiratory: Saturating well on RA, post-op c/b b/l PTX s/p b/l pigtails now removed   -Encourage C+DB and Use of IS 10x / hr while awake.  -PA/lat yesterday no acute pathology  -continue nebulizers    GI: Stable, +BM post op  -PPX with protonix  -PO Diet.    Renal / : enlarged prostate with difficult clark placement intraop, post-op clark was removed and pt with high post void residuals- clark was replaced by  10/15/19. post-op also c/b JOHANNA now improving continue to monitor   -flomax increased to BID dosing and continue proscar  - following appreciate recs- plan for TOV tomorrow, if high post-void residuals possible replacement of clark VS suprapubic tube   -will con't to monitor closely     Endocrine:  no h/o DM or thyroid disease   -A1c 4.9  -TSH- 2.2    Hematologic: post-op anemia given PRBC.   -H/H: 8.9/27.7- on Fe and Vitamin C   -eliquis d/c'ed today 2/2 NSR >48hrs     ID: no acute issues   -afebrile   -WBC downtrending 13 from 15    Prophylaxis:  -DVT prophylaxis with SQH  -SCD's    Disposition:  -home tomorrow when medically ready

## 2019-10-16 NOTE — PROGRESS NOTE ADULT - SUBJECTIVE AND OBJECTIVE BOX
Patient discussed on morning rounds with Dr. Anaya     Operation / Date: 10/8/19 OPCABx2 (LIMA-LAD, SVG-OM) EF 45%    SUBJECTIVE ASSESSMENT:  79y Male seen and examined at the bedside. No acute events overnight reported by nursing or patient. Pt having BMs and clark is in place for urinary retentions. Pt denies CP, SOB, abd pain.     Vital Signs Last 24 Hrs  T(C): 36.9 (16 Oct 2019 10:00), Max: 37.5 (16 Oct 2019 01:01)  T(F): 98.5 (16 Oct 2019 10:00), Max: 99.5 (16 Oct 2019 01:01)  HR: 56 (16 Oct 2019 08:58) (56 - 64)  BP: 133/61 (16 Oct 2019 08:58) (123/56 - 147/64)  BP(mean): 96 (16 Oct 2019 08:58) (85 - 107)  RR: 16 (16 Oct 2019 08:58) (16 - 19)  SpO2: 96% (16 Oct 2019 08:58) (96% - 98%)  I&O's Detail    15 Oct 2019 07:01  -  16 Oct 2019 07:00  --------------------------------------------------------  IN:    Oral Fluid: 250 mL  Total IN: 250 mL    OUT:    Indwelling Catheter - Urethral: 1175 mL    Voided: 650 mL  Total OUT: 1825 mL    Total NET: -1575 mL      16 Oct 2019 07:01  -  16 Oct 2019 10:39  --------------------------------------------------------  IN:    Oral Fluid: 240 mL  Total IN: 240 mL    OUT:  Total OUT: 0 mL    Total NET: 240 mL          CHEST TUBE:  Yes/No. AIR LEAKS: Yes/No. Suction / H2O SEAL.   KELLIE DRAIN:  Yes/No.  EPICARDIAL WIRES: Yes/No.  TIE DOWNS: Yes/No.  CLARK: Yes/No.    PHYSICAL EXAM:    General:     Neurological:    Cardiovascular:    Respiratory:    Gastrointestinal:    Extremities:    Vascular:    Incision Sites:    LABS:                        8.9    13.32 )-----------( 228      ( 16 Oct 2019 05:35 )             27.7       COUMADIN:  No..    PT/INR - ( 16 Oct 2019 05:35 )   PT: 16.7 sec;   INR: 1.46    PTT - ( 16 Oct 2019 05:35 )  PTT:30.0 sec    10    134<L>  |  98  |  30<H>  ----------------------------<  104<H>  3.8   |  24  |  1.63<H>    Ca    8.4      16 Oct 2019 05:35  Phos  4.0     10-14  Mg     2.0     10-16    TPro  6.0  /  Alb  3.2<L>  /  TBili  1.6<H>  /  DBili  x   /  AST  43<H>  /  ALT  37  /  AlkPhos  137<H>  10-      Urinalysis Basic - ( 15 Oct 2019 19:12 )    Color: Orange / Appearance: Cloudy / S.020 / pH: x  Gluc: x / Ketone: Trace mg/dL  / Bili: Small / Urobili: 1.0 E.U./dL   Blood: x / Protein: 100 mg/dL / Nitrite: NEGATIVE   Leuk Esterase: Trace / RBC: Many /HPF / WBC < 5 /HPF   Sq Epi: x / Non Sq Epi: 0-5 /HPF / Bacteria: Present /HPF        MEDICATIONS  (STANDING):  allopurinol 300 milliGRAM(s) Oral two times a day  amiodarone    Tablet 200 milliGRAM(s) Oral daily  amiodarone    Tablet   Oral   ascorbic acid 500 milliGRAM(s) Oral daily  aspirin enteric coated 81 milliGRAM(s) Oral daily  atorvastatin 80 milliGRAM(s) Oral at bedtime  buDESOnide    Inhalation Suspension 0.5 milliGRAM(s) Inhalation every 12 hours  docusate sodium 100 milliGRAM(s) Oral three times a day  ferrous    sulfate 325 milliGRAM(s) Oral daily  finasteride 5 milliGRAM(s) Oral daily  heparin  Injectable 5000 Unit(s) SubCutaneous every 8 hours  metoprolol tartrate 25 milliGRAM(s) Oral every 6 hours  pantoprazole    Tablet 40 milliGRAM(s) Oral before breakfast  senna 1 Tablet(s) Oral at bedtime  sodium chloride 0.9% lock flush 3 milliLiter(s) IV Push every 8 hours  tamsulosin 0.4 milliGRAM(s) Oral every 12 hours    MEDICATIONS  (PRN):  levalbuterol Inhalation 0.63 milliGRAM(s) Inhalation every 6 hours PRN SOB  oxyCODONE    IR 5 milliGRAM(s) Oral every 6 hours PRN Moderate Pain (4 - 6)  polyethylene glycol 3350 17 Gram(s) Oral daily PRN Constipation        RADIOLOGY & ADDITIONAL TESTS:  CXR: < from: Xray Chest 2 Views PA/Lat (10.15.19 @ 09:39) >  Impression:    Similar appearance to prior exam 10/14/2019. Minimal focal atelectasis.   Small pleural effusions. Postoperative change. Mild hypoinflation. No   pneumothorax. No acute infiltrate.    < end of copied text > Patient discussed on morning rounds with Dr. Anaya     Operation / Date: 10/8/19 OPCABx2 (LIMA-LAD, SVG-OM) EF 45%    SUBJECTIVE ASSESSMENT:  79y Male seen and examined at the bedside. No acute events overnight reported by nursing or patient. Pt having BMs and clark is in place for urinary retentions. Pt denies CP, SOB, abd pain.     Vital Signs Last 24 Hrs  T(C): 36.9 (16 Oct 2019 10:00), Max: 37.5 (16 Oct 2019 01:01)  T(F): 98.5 (16 Oct 2019 10:00), Max: 99.5 (16 Oct 2019 01:01)  HR: 56 (16 Oct 2019 08:58) (56 - 64)  BP: 133/61 (16 Oct 2019 08:58) (123/56 - 147/64)  BP(mean): 96 (16 Oct 2019 08:58) (85 - 107)  RR: 16 (16 Oct 2019 08:58) (16 - 19)  SpO2: 96% (16 Oct 2019 08:58) (96% - 98%)  I&O's Detail    15 Oct 2019 07:01  -  16 Oct 2019 07:00  --------------------------------------------------------  IN:    Oral Fluid: 250 mL  Total IN: 250 mL    OUT:    Indwelling Catheter - Urethral: 1175 mL    Voided: 650 mL  Total OUT: 1825 mL    Total NET: -1575 mL      16 Oct 2019 07:01  -  16 Oct 2019 10:39  --------------------------------------------------------  IN:    Oral Fluid: 240 mL  Total IN: 240 mL    OUT:  Total OUT: 0 mL    Total NET: 240 mL      CHEST TUBE:  No.   KELLIE DRAIN:  No.  EPICARDIAL WIRES: Yes  TIE DOWNS: Yes  CLARK: Yes  ***RIGHT APICAL ANTERIOR PIGTAIL SITE STITCH REMOVE AT D/C**    PHYSICAL EXAM:    General: NAD, sitting upright in chair     Neurological: A&Ox3, moving all extremities with no focal deficits    Cardiovascular: RRR, no m/r/g    Respiratory: CTA b/l no w/r/r    Gastrointestinal: +bowel sounds, NT-ND, soft     Extremities: warm and well perfused, +REVH site +1-2 pitting edema (improved from yesterday), no edema to LLE    Vascular: +dopplerable distal LE pulses     Incision Sites: sternotomy CDI, no erythema, ecchymosis or drainage, R EVH site + swelling and edema and mild ecchymosis, no drianage     LABS:                        8.9    13.32 )-----------( 228      ( 16 Oct 2019 05:35 )             27.7       COUMADIN:  No..    PT/INR - ( 16 Oct 2019 05:35 )   PT: 16.7 sec;   INR: 1.46    PTT - ( 16 Oct 2019 05:35 )  PTT:30.0 sec    10-16    134<L>  |  98  |  30<H>  ----------------------------<  104<H>  3.8   |  24  |  1.63<H>    Ca    8.4      16 Oct 2019 05:35  Phos  4.0     10-14  Mg     2.0     10-16    TPro  6.0  /  Alb  3.2<L>  /  TBili  1.6<H>  /  DBili  x   /  AST  43<H>  /  ALT  37  /  AlkPhos  137<H>  10-16      Urinalysis Basic - ( 15 Oct 2019 19:12 )    Color: Orange / Appearance: Cloudy / S.020 / pH: x  Gluc: x / Ketone: Trace mg/dL  / Bili: Small / Urobili: 1.0 E.U./dL   Blood: x / Protein: 100 mg/dL / Nitrite: NEGATIVE   Leuk Esterase: Trace / RBC: Many /HPF / WBC < 5 /HPF   Sq Epi: x / Non Sq Epi: 0-5 /HPF / Bacteria: Present /HPF        MEDICATIONS  (STANDING):  allopurinol 300 milliGRAM(s) Oral two times a day  amiodarone    Tablet 200 milliGRAM(s) Oral daily  amiodarone    Tablet   Oral   ascorbic acid 500 milliGRAM(s) Oral daily  aspirin enteric coated 81 milliGRAM(s) Oral daily  atorvastatin 80 milliGRAM(s) Oral at bedtime  buDESOnide    Inhalation Suspension 0.5 milliGRAM(s) Inhalation every 12 hours  docusate sodium 100 milliGRAM(s) Oral three times a day  ferrous    sulfate 325 milliGRAM(s) Oral daily  finasteride 5 milliGRAM(s) Oral daily  heparin  Injectable 5000 Unit(s) SubCutaneous every 8 hours  metoprolol tartrate 25 milliGRAM(s) Oral every 6 hours  pantoprazole    Tablet 40 milliGRAM(s) Oral before breakfast  senna 1 Tablet(s) Oral at bedtime  sodium chloride 0.9% lock flush 3 milliLiter(s) IV Push every 8 hours  tamsulosin 0.4 milliGRAM(s) Oral every 12 hours    MEDICATIONS  (PRN):  levalbuterol Inhalation 0.63 milliGRAM(s) Inhalation every 6 hours PRN SOB  oxyCODONE    IR 5 milliGRAM(s) Oral every 6 hours PRN Moderate Pain (4 - 6)  polyethylene glycol 3350 17 Gram(s) Oral daily PRN Constipation        RADIOLOGY & ADDITIONAL TESTS:  CXR: < from: Xray Chest 2 Views PA/Lat (10.15.19 @ 09:39) >  Impression:    Similar appearance to prior exam 10/14/2019. Minimal focal atelectasis.   Small pleural effusions. Postoperative change. Mild hypoinflation. No   pneumothorax. No acute infiltrate.    < end of copied text >

## 2019-10-16 NOTE — PROGRESS NOTE ADULT - SUBJECTIVE AND OBJECTIVE BOX
Seen with Dr. Lezama. Pt is well known to him. In retention and clark replaced. On Flomax 0.4mg qhs.   Outpt imaging reveals 468g prostate (normal is 20g)   Will attempt TOV tomorrow, after hes been on flomax BID  If he is unable to void tomorrow with an acceptable PVR will replace clark  He may ultimately need an intervention to relieve the obstruction, which, given his size is limited to a simple prostatectomy either robotic or open.  Will follow, d/w CT surg staff

## 2019-10-16 NOTE — PROGRESS NOTE ADULT - SUBJECTIVE AND OBJECTIVE BOX
CTICU  CRITICAL  CARE  attending     Hand off received 					   Pertinent clinical, laboratory, radiographic, hemodynamic, echocardiographic, respiratory data, microbiologic data and chart were reviewed and analyzed frequently throughout the course of the day and night  Patient seen and examined with CTS/ SH attending at bedside  Pt is a 79y , Male, HEALTH ISSUES - PROBLEM Dx:  HLD (hyperlipidemia): HLD (hyperlipidemia)  HTN (hypertension): HTN (hypertension)  Renal artery stenosis: Renal artery stenosis  CAD (coronary artery disease): CAD (coronary artery disease)      , FAMILY HISTORY:  PAST MEDICAL & SURGICAL HISTORY:  Gout  HLD (hyperlipidemia)  HTN (hypertension)  Renal artery stenosis  CAD (coronary artery disease)  H/O renal artery stenosis: s/p stent  S/P coronary artery stent placement    Patient is a 79y old  Male who presents with a chief complaint of Chest pain (16 Oct 2019 10:38)      14 system review was unremarkable    Vital signs, hemodynamic and respiratory parameters were reviewed from the bedside nursing flowsheet.  ICU Vital Signs Last 24 Hrs  T(C): 36.8 (17 Oct 2019 09:02), Max: 37.6 (17 Oct 2019 05:01)  T(F): 98.2 (17 Oct 2019 09:02), Max: 99.7 (17 Oct 2019 05:01)  HR: 62 (17 Oct 2019 08:42) (58 - 66)  BP: 140/54 (17 Oct 2019 08:42) (116/53 - 143/62)  BP(mean): 85 (17 Oct 2019 08:42) (80 - 100)  ABP: --  ABP(mean): --  RR: 22 (17 Oct 2019 08:42) (17 - 22)  SpO2: 94% (17 Oct 2019 08:42) (94% - 96%)    Adult Advanced Hemodynamics Last 24 Hrs  CVP(mm Hg): --  CVP(cm H2O): --  CO: --  CI: --  PA: --  PA(mean): --  PCWP: --  SVR: --  SVRI: --  PVR: --  PVRI: --,     Intake and output was reviewed and the fluid balance was calculated  Daily     Daily   I&O's Summary    16 Oct 2019 07:01  -  17 Oct 2019 07:00  --------------------------------------------------------  IN: 1197 mL / OUT: 2500 mL / NET: -1303 mL        All lines and drain sites were assessed  Glycemic trend was reviewedCAPILLARY BLOOD GLUCOSE        No acute change in mental status  Auscultation of the chest reveals equal bs  Abdomen is soft  Extremities are warm and well perfused  Wounds appear clean and unremarkable  Antibiotics are periop    labs  CBC Full  -  ( 17 Oct 2019 05:41 )  WBC Count : 12.29 K/uL  RBC Count : 2.85 M/uL  Hemoglobin : 8.7 g/dL  Hematocrit : 26.4 %  Platelet Count - Automated : 260 K/uL  Mean Cell Volume : 92.6 fl  Mean Cell Hemoglobin : 30.5 pg  Mean Cell Hemoglobin Concentration : 33.0 gm/dL  Auto Neutrophil # : x  Auto Lymphocyte # : x  Auto Monocyte # : x  Auto Eosinophil # : x  Auto Basophil # : x  Auto Neutrophil % : x  Auto Lymphocyte % : x  Auto Monocyte % : x  Auto Eosinophil % : x  Auto Basophil % : x    10-17    130<L>  |  98  |  30<H>  ----------------------------<  105<H>  3.8   |  21<L>  |  1.51<H>    Ca    8.3<L>      17 Oct 2019 05:41  Mg     2.1     10-17    TPro  5.8<L>  /  Alb  2.8<L>  /  TBili  1.4<H>  /  DBili  x   /  AST  39  /  ALT  36  /  AlkPhos  138<H>  10-17    PT/INR - ( 17 Oct 2019 05:41 )   PT: 16.4 sec;   INR: 1.44          PTT - ( 17 Oct 2019 05:41 )  PTT:30.9 sec  The current medications were reviewed   MEDICATIONS  (STANDING):  allopurinol 300 milliGRAM(s) Oral two times a day  amiodarone    Tablet 200 milliGRAM(s) Oral daily  amiodarone    Tablet   Oral   ascorbic acid 500 milliGRAM(s) Oral daily  aspirin enteric coated 81 milliGRAM(s) Oral daily  atorvastatin 80 milliGRAM(s) Oral at bedtime  buDESOnide    Inhalation Suspension 0.5 milliGRAM(s) Inhalation every 12 hours  docusate sodium 100 milliGRAM(s) Oral three times a day  ferrous    sulfate 325 milliGRAM(s) Oral daily  finasteride 5 milliGRAM(s) Oral daily  heparin  Injectable 5000 Unit(s) SubCutaneous every 8 hours  metoprolol tartrate 25 milliGRAM(s) Oral every 6 hours  pantoprazole    Tablet 40 milliGRAM(s) Oral before breakfast  senna 1 Tablet(s) Oral at bedtime  sodium chloride 0.9% lock flush 3 milliLiter(s) IV Push every 8 hours  tamsulosin 0.4 milliGRAM(s) Oral every 12 hours    MEDICATIONS  (PRN):  levalbuterol Inhalation 0.63 milliGRAM(s) Inhalation every 6 hours PRN SOB  oxyCODONE    IR 5 milliGRAM(s) Oral every 6 hours PRN Moderate Pain (4 - 6)  polyethylene glycol 3350 17 Gram(s) Oral daily PRN Constipation       PROBLEM LIST/ ASSESSMENT:  HEALTH ISSUES - PROBLEM Dx:  HLD (hyperlipidemia): HLD (hyperlipidemia)  HTN (hypertension): HTN (hypertension)  Renal artery stenosis: Renal artery stenosis  CAD (coronary artery disease): CAD (coronary artery disease)      ,   Patient is a 79y old  Male who presents with a chief complaint of Chest pain (16 Oct 2019 10:38)     s/p cardiac surgery          My plan includes :  close hemodynamic, ventilatory and drain monitoring and management per post op routine    Monitor for arrhythmias and monitor parameters for organ perfusion  beta blockade not administered due to hemodynamic instability and bradycardia  monitor neurologic status  Head of the bed should remain elevated to 45 deg .   chest PT and IS will be encouraged  monitor adequacy of oxygenation and ventilation and attempt to wean oxygen  antibiotic regimen will be tailored to the clinical, laboratory and microbiologic data  Nutritional goals will be met using po eventually , ensure adequate caloric intake and montior the same  Stress ulcer and VTE prophylaxis will be achieved    Glycemic control is satisfactory  Electrolytes have been repleted as necessary and wound care has been carried out. Pain control has been achieved.   agressive physical therapy and early mobility and ambulation goals will be met   The family was updated about the course and plan  CRITICAL CARE TIME personally provided by me  in evaluation and management, reassessments, review and interpretation of labs and x-rays, ventilator and hemodynamic management, formulating a plan and coordinating care: ___90____ MIN.  Time does not include procedural time.  CTICU ATTENDING     					    Lm Dunne MD

## 2019-10-16 NOTE — CHART NOTE - NSCHARTNOTEFT_GEN_A_CORE
Admitting Diagnosis:   Patient is a 79y old  Male who presents with a chief complaint of Chest pain (16 Oct 2019 09:48)      PAST MEDICAL & SURGICAL HISTORY:  Gout  HLD (hyperlipidemia)  HTN (hypertension)  Renal artery stenosis  CAD (coronary artery disease)  H/O renal artery stenosis: s/p stent  S/P coronary artery stent placement      Current Nutrition Order: DASH/TLC + Ensure Enlive TID (1050 kcal, 60g protein, 540mL free H2O)        PO Intake: Good (%) [   ]  Fair (50-75%) [ x  ] Poor (<25%) [   ]    GI Issues: no noted n/v/d/c    Pain: being managed     Skin Integrity: surgical incision     Labs:   10-16    134<L>  |  98  |  30<H>  ----------------------------<  104<H>  3.8   |  24  |  1.63<H>    Ca    8.4      16 Oct 2019 05:35  Phos  4.0     10-14  Mg     2.0     10-16    TPro  6.0  /  Alb  3.2<L>  /  TBili  1.6<H>  /  DBili  x   /  AST  43<H>  /  ALT  37  /  AlkPhos  137<H>  10-16    CAPILLARY BLOOD GLUCOSE          Medications:  MEDICATIONS  (STANDING):  allopurinol 300 milliGRAM(s) Oral two times a day  amiodarone    Tablet 200 milliGRAM(s) Oral daily  amiodarone    Tablet   Oral   ascorbic acid 500 milliGRAM(s) Oral daily  aspirin enteric coated 81 milliGRAM(s) Oral daily  atorvastatin 80 milliGRAM(s) Oral at bedtime  buDESOnide    Inhalation Suspension 0.5 milliGRAM(s) Inhalation every 12 hours  docusate sodium 100 milliGRAM(s) Oral three times a day  ferrous    sulfate 325 milliGRAM(s) Oral daily  finasteride 5 milliGRAM(s) Oral daily  heparin  Injectable 5000 Unit(s) SubCutaneous every 8 hours  metoprolol tartrate 25 milliGRAM(s) Oral every 6 hours  pantoprazole    Tablet 40 milliGRAM(s) Oral before breakfast  senna 1 Tablet(s) Oral at bedtime  sodium chloride 0.9% lock flush 3 milliLiter(s) IV Push every 8 hours  tamsulosin 0.4 milliGRAM(s) Oral every 12 hours    MEDICATIONS  (PRN):  levalbuterol Inhalation 0.63 milliGRAM(s) Inhalation every 6 hours PRN SOB  oxyCODONE    IR 5 milliGRAM(s) Oral every 6 hours PRN Moderate Pain (4 - 6)  polyethylene glycol 3350 17 Gram(s) Oral daily PRN Constipation      Weight:   84.1kg (10/8)   82.5kg (10/10)     Weight Change: -2kg likely in setting of fluid shifts     Nutrition Focused Physical Exam: Completed [   ]  Not Pertinent [ x  ]    Estimated energy needs:   Height: 5'7.5" Weight: 182lbs, IBW 154lbs+/-10%, %%, BMI 28.6  ABW used for calculations as pt between % of IBW.  Nutrient needs based on [facility] standards of care for maintenance in older adults.  Needs adjusted for post-op healing  2062-2475kcal (25-30kcal/kg)   99-115g pro (1.2-1.4g/kg pro)   2475-2887ml (30-35ml/kg)     Subjective:   78yo M former smoker, w/ PMH of HTN, HLD, renal artery stenosis s/p L renal stent (2011), CARD s/p MI w/ thrombectomy and PES (ROBERTA to pLAD and PTCA/ROBERTA to mLAD in 6/21. P/w substernal CP/pressure, was ruled in for NSTEMI. Now s/p CABG x2, and reexploration for bleeding. On initial assessment RD discussed purpose of current diet order relative to heart health. Pt's wife reports they somewhat monitor intake (i.e. use vegetable oil instead of butter), but need to reduce Na intake. Pt and wife provided w/ written edu on cardiac nutrition therapy, low Na seasonings, and heart healthy cooking tips. Wife was receptive and appreciative of information provided. Pt continues to be managed/ monitored post-op at this time on SDU, ambulating halls + moving bowels regularly, now pending TOV. Intake remains fair, consuming ensure intermittently. Will continue to follow per protocol.     Previous Nutrition Diagnosis: Increased nutrient needs r/t increased demand post-op AEB s/p CABG     Active [ x  ]  Resolved [   ]    If resolved, new PES:     Goal: meet >75% EER     Recommendations:  1) Continue on DASH diet  2) Honor pts food preferences  3) Encourage protein options 2/2 increased needs post-op   3. C/w EnsureEnlive BID (700kcal, 40g pro)   4. Trend wts   5. Manage pain prn     Education: reinforce diet ed/ encouraged intake     Risk Level: High [   ] Moderate [ x  ] Low [   ]

## 2019-10-17 ENCOUNTER — TRANSCRIPTION ENCOUNTER (OUTPATIENT)
Age: 79
End: 2019-10-17

## 2019-10-17 ENCOUNTER — FORM ENCOUNTER (OUTPATIENT)
Age: 79
End: 2019-10-17

## 2019-10-17 VITALS — TEMPERATURE: 99 F

## 2019-10-17 LAB
ALBUMIN SERPL ELPH-MCNC: 2.8 G/DL — LOW (ref 3.3–5)
ALP SERPL-CCNC: 138 U/L — HIGH (ref 40–120)
ALT FLD-CCNC: 36 U/L — SIGNIFICANT CHANGE UP (ref 10–45)
ANION GAP SERPL CALC-SCNC: 11 MMOL/L — SIGNIFICANT CHANGE UP (ref 5–17)
APTT BLD: 30.9 SEC — SIGNIFICANT CHANGE UP (ref 27.5–36.3)
AST SERPL-CCNC: 39 U/L — SIGNIFICANT CHANGE UP (ref 10–40)
BILIRUB SERPL-MCNC: 1.4 MG/DL — HIGH (ref 0.2–1.2)
BLD GP AB SCN SERPL QL: NEGATIVE — SIGNIFICANT CHANGE UP
BUN SERPL-MCNC: 30 MG/DL — HIGH (ref 7–23)
CALCIUM SERPL-MCNC: 8.3 MG/DL — LOW (ref 8.4–10.5)
CHLORIDE SERPL-SCNC: 98 MMOL/L — SIGNIFICANT CHANGE UP (ref 96–108)
CO2 SERPL-SCNC: 21 MMOL/L — LOW (ref 22–31)
CREAT SERPL-MCNC: 1.51 MG/DL — HIGH (ref 0.5–1.3)
GLUCOSE SERPL-MCNC: 105 MG/DL — HIGH (ref 70–99)
HCT VFR BLD CALC: 26.4 % — LOW (ref 39–50)
HGB BLD-MCNC: 8.7 G/DL — LOW (ref 13–17)
INR BLD: 1.44 — HIGH (ref 0.88–1.16)
MAGNESIUM SERPL-MCNC: 2.1 MG/DL — SIGNIFICANT CHANGE UP (ref 1.6–2.6)
MCHC RBC-ENTMCNC: 30.5 PG — SIGNIFICANT CHANGE UP (ref 27–34)
MCHC RBC-ENTMCNC: 33 GM/DL — SIGNIFICANT CHANGE UP (ref 32–36)
MCV RBC AUTO: 92.6 FL — SIGNIFICANT CHANGE UP (ref 80–100)
NRBC # BLD: 0 /100 WBCS — SIGNIFICANT CHANGE UP (ref 0–0)
PLATELET # BLD AUTO: 260 K/UL — SIGNIFICANT CHANGE UP (ref 150–400)
POTASSIUM SERPL-MCNC: 3.8 MMOL/L — SIGNIFICANT CHANGE UP (ref 3.5–5.3)
POTASSIUM SERPL-SCNC: 3.8 MMOL/L — SIGNIFICANT CHANGE UP (ref 3.5–5.3)
PROT SERPL-MCNC: 5.8 G/DL — LOW (ref 6–8.3)
PROTHROM AB SERPL-ACNC: 16.4 SEC — HIGH (ref 10–12.9)
RBC # BLD: 2.85 M/UL — LOW (ref 4.2–5.8)
RBC # FLD: 15.5 % — HIGH (ref 10.3–14.5)
RH IG SCN BLD-IMP: POSITIVE — SIGNIFICANT CHANGE UP
SODIUM SERPL-SCNC: 130 MMOL/L — LOW (ref 135–145)
WBC # BLD: 12.29 K/UL — HIGH (ref 3.8–10.5)
WBC # FLD AUTO: 12.29 K/UL — HIGH (ref 3.8–10.5)

## 2019-10-17 RX ORDER — METOPROLOL TARTRATE 50 MG
1 TABLET ORAL
Qty: 60 | Refills: 0
Start: 2019-10-17 | End: 2019-11-15

## 2019-10-17 RX ORDER — AMLODIPINE BESYLATE 2.5 MG/1
1 TABLET ORAL
Qty: 0 | Refills: 0 | DISCHARGE

## 2019-10-17 RX ORDER — FINASTERIDE 5 MG/1
1 TABLET, FILM COATED ORAL
Qty: 30 | Refills: 0
Start: 2019-10-17 | End: 2019-11-15

## 2019-10-17 RX ORDER — TAMSULOSIN HYDROCHLORIDE 0.4 MG/1
1 CAPSULE ORAL
Qty: 60 | Refills: 0
Start: 2019-10-17 | End: 2019-11-15

## 2019-10-17 RX ORDER — ASCORBIC ACID 60 MG
1 TABLET,CHEWABLE ORAL
Qty: 30 | Refills: 0
Start: 2019-10-17 | End: 2019-11-15

## 2019-10-17 RX ORDER — ALLOPURINOL 300 MG
1 TABLET ORAL
Qty: 0 | Refills: 0 | DISCHARGE

## 2019-10-17 RX ORDER — ROSUVASTATIN CALCIUM 5 MG/1
1 TABLET ORAL
Qty: 0 | Refills: 0 | DISCHARGE

## 2019-10-17 RX ORDER — DOCUSATE SODIUM 100 MG
1 CAPSULE ORAL
Qty: 30 | Refills: 0
Start: 2019-10-17 | End: 2019-10-26

## 2019-10-17 RX ORDER — FOSINOPRIL SODIUM 10 MG/1
1 TABLET ORAL
Qty: 0 | Refills: 0 | DISCHARGE

## 2019-10-17 RX ORDER — ALLOPURINOL 300 MG
1 TABLET ORAL
Qty: 60 | Refills: 0
Start: 2019-10-17 | End: 2019-11-15

## 2019-10-17 RX ORDER — APIXABAN 2.5 MG/1
1 TABLET, FILM COATED ORAL
Qty: 60 | Refills: 0
Start: 2019-10-17 | End: 2019-11-15

## 2019-10-17 RX ORDER — ASPIRIN/CALCIUM CARB/MAGNESIUM 324 MG
1 TABLET ORAL
Qty: 30 | Refills: 0
Start: 2019-10-17 | End: 2019-11-15

## 2019-10-17 RX ORDER — ASPIRIN/CALCIUM CARB/MAGNESIUM 324 MG
1 TABLET ORAL
Qty: 0 | Refills: 0 | DISCHARGE

## 2019-10-17 RX ORDER — ROSUVASTATIN CALCIUM 5 MG/1
1 TABLET ORAL
Qty: 30 | Refills: 0
Start: 2019-10-17 | End: 2019-11-15

## 2019-10-17 RX ORDER — AMIODARONE HYDROCHLORIDE 400 MG/1
1 TABLET ORAL
Qty: 30 | Refills: 0
Start: 2019-10-17 | End: 2019-11-15

## 2019-10-17 RX ORDER — CARVEDILOL PHOSPHATE 80 MG/1
1 CAPSULE, EXTENDED RELEASE ORAL
Qty: 0 | Refills: 0 | DISCHARGE

## 2019-10-17 RX ORDER — SENNA PLUS 8.6 MG/1
1 TABLET ORAL
Qty: 10 | Refills: 0
Start: 2019-10-17 | End: 2019-10-26

## 2019-10-17 RX ADMIN — HEPARIN SODIUM 5000 UNIT(S): 5000 INJECTION INTRAVENOUS; SUBCUTANEOUS at 13:16

## 2019-10-17 RX ADMIN — Medication 300 MILLIGRAM(S): at 05:51

## 2019-10-17 RX ADMIN — TAMSULOSIN HYDROCHLORIDE 0.4 MILLIGRAM(S): 0.4 CAPSULE ORAL at 05:51

## 2019-10-17 RX ADMIN — Medication 325 MILLIGRAM(S): at 10:45

## 2019-10-17 RX ADMIN — Medication 100 MILLIGRAM(S): at 13:16

## 2019-10-17 RX ADMIN — AMIODARONE HYDROCHLORIDE 200 MILLIGRAM(S): 400 TABLET ORAL at 05:51

## 2019-10-17 RX ADMIN — Medication 500 MILLIGRAM(S): at 13:44

## 2019-10-17 RX ADMIN — Medication 81 MILLIGRAM(S): at 10:45

## 2019-10-17 RX ADMIN — Medication 300 MILLIGRAM(S): at 18:30

## 2019-10-17 RX ADMIN — SODIUM CHLORIDE 3 MILLILITER(S): 9 INJECTION INTRAMUSCULAR; INTRAVENOUS; SUBCUTANEOUS at 13:14

## 2019-10-17 RX ADMIN — Medication 25 MILLIGRAM(S): at 07:25

## 2019-10-17 RX ADMIN — HEPARIN SODIUM 5000 UNIT(S): 5000 INJECTION INTRAVENOUS; SUBCUTANEOUS at 05:51

## 2019-10-17 RX ADMIN — Medication 25 MILLIGRAM(S): at 18:30

## 2019-10-17 RX ADMIN — Medication 25 MILLIGRAM(S): at 00:42

## 2019-10-17 RX ADMIN — SODIUM CHLORIDE 3 MILLILITER(S): 9 INJECTION INTRAMUSCULAR; INTRAVENOUS; SUBCUTANEOUS at 05:41

## 2019-10-17 RX ADMIN — Medication 0.5 MILLIGRAM(S): at 05:58

## 2019-10-17 RX ADMIN — TAMSULOSIN HYDROCHLORIDE 0.4 MILLIGRAM(S): 0.4 CAPSULE ORAL at 18:30

## 2019-10-17 RX ADMIN — PANTOPRAZOLE SODIUM 40 MILLIGRAM(S): 20 TABLET, DELAYED RELEASE ORAL at 07:24

## 2019-10-17 RX ADMIN — Medication 25 MILLIGRAM(S): at 13:16

## 2019-10-17 RX ADMIN — FINASTERIDE 5 MILLIGRAM(S): 5 TABLET, FILM COATED ORAL at 10:45

## 2019-10-17 NOTE — DISCHARGE NOTE NURSING/CASE MANAGEMENT/SOCIAL WORK - NSDCFUADDAPPT_GEN_ALL_CORE_FT
-Please follow up with Dr. Anaya on 10/29/19@ 9:30AM.  The office is located at [facility] 4th Floor.  Call us with any questions #[phone].    -Please follow up with Dr. Langston on 10/29/19 @ 9:00am.  His office is located at [facility] 9th floor.    -Please follow up with your PMD Dr. Patiño on 10/22/19 @ 12:45pm   The office is located at [address] [place] [address] [phone]    -Please follow up with your urologist Dr. Vasiliy Silva on 10/23/19 @ 9:00am.  Your visiting nurse will draw some blood tomorrow to monitor your kidney function and fax results to Dr. Silva

## 2019-10-17 NOTE — DISCHARGE NOTE PROVIDER - HOSPITAL COURSE
This is a 79 year old male, former smoker, with history of HTN, HLD, renal artery stenosis s/p left renal stent in 2011, CAD s/p MI with thrombectomy and PES (ROBERTA to pLAD and PTCA/ROBERTA to mLAD in 6/2010 with ROBERTA to LCx with tandemheart/LVAD a week later on 6/21/2010 who presented to [facility] ED on 10/7/19 with substernal chest pain/pressure, 5/10, non-radiating that began that morning at approximately 4am without relief.  He reports that over the last month he has had intermittent "indigestion" with spontaneous onset as well as claudication with diminished exercise tolerance for which he was recently evaluated by his PMD, Dr. Patiño, where he had an "abnormal ECHO" and was encouraged to follow-up with cardiology.  In ED, VS stable, EKG showed TWI in II, III  with ST depressions in V4-V6 with negative troponin.  Pain was not relieved with medical therapies and underwent cardiac cath showing LM normal, 99% mLAD, pLCx 100% with L-L collaterals, mRCA 100% with R-R collaterals.  IABP was placed and he was admitted to CCU for further evaluation and management.  Dr. Anaya CT surgery, consulted for surgical evaluation.  He completed a pre-op workup and had a clark placed by  2/2 his history of enlarged prostate.  He was ultimately brought to the OR on 10/8 for OPCABx2.  His operative course was uncomplicated and he was brought to CTICU post-op intubated and HD stable, but returned to the OR that evening for bleeding with tamponade physiology.  Bleeding was identified and controlled and he returned to CTICU intubated and HD stable.  He was extubated on pod#1.  His chest tubes were removed on pod#2 but following removal he developed small b/l PTX requiring insertion of b/l pigtail catheters on pod#3.  The chest tubes were removed on pod#5 and repeat CXR was stable.  His clark was removed on pod#5 as well but ws retaining urine and ultimately required it to be reinserted on pod#7.  His flomax was increased to bid dosing and the clark was removed again on pod#8 ...... This is a 79 year old male, former smoker, with history of HTN, HLD, renal artery stenosis s/p left renal stent in 2011, CAD s/p MI with thrombectomy and PES (ROBERTA to pLAD and PTCA/ROBERTA to mLAD in 6/2010 with ROBERTA to LCx with tandemheart/LVAD a week later on 6/21/2010 who presented to [facility] ED on 10/7/19 with substernal chest pain/pressure, 5/10, non-radiating that began that morning at approximately 4am without relief.  He reports that over the last month he has had intermittent "indigestion" with spontaneous onset as well as claudication with diminished exercise tolerance for which he was recently evaluated by his PMD, Dr. Patiño, where he had an "abnormal ECHO" and was encouraged to follow-up with cardiology.  In ED, VS stable, EKG showed TWI in II, III  with ST depressions in V4-V6 with negative troponin.  Pain was not relieved with medical therapies and underwent cardiac cath showing LM normal, 99% mLAD, pLCx 100% with L-L collaterals, mRCA 100% with R-R collaterals.  IABP was placed and he was admitted to CCU for further evaluation and management.  Dr. Anaya CT surgery, consulted for surgical evaluation.  He completed a pre-op workup and had a clark placed by  2/2 his history of enlarged prostate.  He was ultimately brought to the OR on 10/8 for OPCABx2.  His operative course was uncomplicated and he was brought to CTICU post-op intubated and HD stable, but returned to the OR that evening for bleeding with tamponade physiology.  Bleeding was identified and controlled and he returned to CTICU intubated and HD stable.  He was extubated on pod#1.  His chest tubes were removed on pod#2 but following removal he developed small b/l PTX requiring insertion of b/l pigtail catheters on pod#3.  The chest tubes were removed on pod#5 and repeat CXR was stable.  His clark was removed on pod#5 as well but ws retaining urine and ultimately required it to be reinserted on pod#7.  His flomax was increased to bid dosing and the clark was removed again on pod#8, he was able to void after clark was removed but had about 360cc post void residual.  Discussed with urology who cleared him to return home without clark with plan to have BMP drawn by [facility] tomorrow and results faxed to his outpatient urologist to monitor his BUN/Cr

## 2019-10-17 NOTE — DISCHARGE NOTE NURSING/CASE MANAGEMENT/SOCIAL WORK - NSDCPEWEB_GEN_ALL_CORE
[place] website --- [email or web address] Tobacco [email or web address] website --- [email or web address]

## 2019-10-17 NOTE — PROGRESS NOTE ADULT - REASON FOR ADMISSION
Chest pain
CABG
Chest pain

## 2019-10-17 NOTE — DISCHARGE NOTE PROVIDER - NSDCFUSCHEDAPPT_GEN_ALL_CORE_FT
ALDEN CHAUHAN ; 10/29/2019 ; NPP Cardio Vasc [address]  ALDEN CHAUHAN ; 10/29/2019 ; NPP CT Surg [address]

## 2019-10-17 NOTE — DISCHARGE NOTE PROVIDER - CARE PROVIDER_API CALL
Skip Anaya (MD)  Cardiovascular Surgery  [address], 4th Floor  [place], [place] [address]  Phone: [phone]  Fax: [phone]  Follow Up Time:

## 2019-10-17 NOTE — PROGRESS NOTE ADULT - SUBJECTIVE AND OBJECTIVE BOX
Patient discussed on morning rounds with Dr. Anaya      Operation / Date:     Surgeon:    Referring Physician:    SUBJECTIVE ASSESSMENT:    Pt reports incisional pain well controlled, denies dyspnea/SOB, fevers/chills.  He has been breathing comfortably on room air and ambulating in the hallway without difficulty.  He denies dysuria, urgency, or suprapubic pain/tenderness.    Hospital Course:    78 yo male, former smoker with a PMhx of HTN, hyperlipidemia, Renal artery stenosis s/p Left renal stent placement (2011), known CAD s/p MI treated with thrombectomy/ROBERTA pLAD and PTCA/ROBERTA mLAD ([facility] 6/15/2010) followed by ROBERTA to prox/mid/distal LCx with tandemheart LV assist device ([facility]; 2010) presented to [facility] ED (10/7/2019) this AM with the complaint of 5/10 substernal chest discomfort described as someone "standing on his chest" associated with back ache that began around 4:30 AM this morning. Patient admits in recent weeks he had two episodes of similar chest discomfort. He denies palpitations, dizziness, syncope, headache, leg edema, CAMACHO/SOB. Patient went for his annular PCP evaluation with Dr. Mohsen Patiño three weeks ago and was told his EKG was changed, Echo was abnormal and was referred for cardiology evaluation 10/17/2019. Patient admits he has not seen a cardiologist in approximately 8 years and has not had any recent ischemic work up. Upon arrival to ED; BP: 144/82, HR: 70s, RR: 16, Afebrile, O2: 98F. 12 Lead EKG revealed Sinus Rhythm with TWI in II, III, ST depressions In V4-V6.  Troponin negative x1. Wet read of frontal CXR negative. Patient received  mg x one dose, Norvasc 10 mg x one dose, Coreg 25 mg x one dose and NTG x two doses. Upon assessment of PA, patient still with 2/10 chest discomfort. Patient now presents for cardiac catheterization with possible intervention if clinically indicated due to patient’s risk factors, extensive CAD and unstable angina.     Vital Signs Last 24 Hrs  T(C): 36.6 (17 Oct 2019 14:33), Max: 37.6 (17 Oct 2019 05:01)  T(F): 97.8 (17 Oct 2019 14:33), Max: 99.7 (17 Oct 2019 05:01)  HR: 58 (17 Oct 2019 16:25) (58 - 72)  BP: 139/61 (17 Oct 2019 16:25) (116/53 - 148/62)  BP(mean): 92 (17 Oct 2019 16:25) (80 - 100)  RR: 21 (17 Oct 2019 16:25) (17 - 22)  SpO2: 95% (17 Oct 2019 16:25) (94% - 96%)  I&O's Detail    16 Oct 2019 07:01  -  17 Oct 2019 07:00  --------------------------------------------------------  IN:    Oral Fluid: 1197 mL  Total IN: 1197 mL    OUT:    Indwelling Catheter - Urethral: 2500 mL  Total OUT: 2500 mL    Total NET: -1303 mL      17 Oct 2019 07:01  -  17 Oct 2019 17:33  --------------------------------------------------------  IN:  Total IN: 0 mL    OUT:    Voided: 275 mL  Total OUT: 275 mL    Total NET: -275 mL    EPICARDIAL WIRES REMOVED: Yeso.  TIE DOWNS REMOVED: Yes.    PHYSICAL EXAM:    General: NAD    Neurological: A&Ox3    Cardiovascular: s1S2 RRR    Respiratory: CTA b/l no W/R/R    Gastrointestinal: soft NT/ND +BS    Extremities: 1+ edema b/l    Vascular: warm and well perfused bilaterally    Incision Sites: MSI C/D/I, R EVH C./D/I      LABS:                        8.7    12.29 )-----------( 260      ( 17 Oct 2019 05:41 )             26.4       COUMADIN:  No.        PT/INR - ( 17 Oct 2019 05:41 )   PT: 16.4 sec;   INR: 1.44          PTT - ( 17 Oct 2019 05:41 )  PTT:30.9 sec    10-    130<L>  |  98  |  30<H>  ----------------------------<  105<H>  3.8   |  21<L>  |  1.51<H>    Ca    8.3<L>      17 Oct 2019 05:41  Mg     2.1     10-17    TPro  5.8<L>  /  Alb  2.8<L>  /  TBili  1.4<H>  /  DBili  x   /  AST  39  /  ALT  36  /  AlkPhos  138<H>  10-17      Urinalysis Basic - ( 15 Oct 2019 19:12 )    Color: Orange / Appearance: Cloudy / S.020 / pH: x  Gluc: x / Ketone: Trace mg/dL  / Bili: Small / Urobili: 1.0 E.U./dL   Blood: x / Protein: 100 mg/dL / Nitrite: NEGATIVE   Leuk Esterase: Trace / RBC: Many /HPF / WBC < 5 /HPF   Sq Epi: x / Non Sq Epi: 0-5 /HPF / Bacteria: Present /HPF    MEDICATIONS  (STANDING):  allopurinol 300 milliGRAM(s) Oral two times a day  amiodarone    Tablet 200 milliGRAM(s) Oral daily  amiodarone    Tablet   Oral   ascorbic acid 500 milliGRAM(s) Oral daily  aspirin enteric coated 81 milliGRAM(s) Oral daily  atorvastatin 80 milliGRAM(s) Oral at bedtime  buDESOnide    Inhalation Suspension 0.5 milliGRAM(s) Inhalation every 12 hours  docusate sodium 100 milliGRAM(s) Oral three times a day  ferrous    sulfate 325 milliGRAM(s) Oral daily  finasteride 5 milliGRAM(s) Oral daily  heparin  Injectable 5000 Unit(s) SubCutaneous every 8 hours  metoprolol tartrate 25 milliGRAM(s) Oral every 6 hours  pantoprazole    Tablet 40 milliGRAM(s) Oral before breakfast  senna 1 Tablet(s) Oral at bedtime  sodium chloride 0.9% lock flush 3 milliLiter(s) IV Push every 8 hours  tamsulosin 0.4 milliGRAM(s) Oral every 12 hours    Discharge CXR: no effusions/infiltrates/PTX

## 2019-10-17 NOTE — DISCHARGE NOTE PROVIDER - NSDCFUADDAPPT_GEN_ALL_CORE_FT
-Please follow up with Dr. Anaya on 10/29/19@ 9:30AM.  The office is located at [facility] 4th Floor.  Call us with any questions #[phone].    -Please follow up with Dr. Langston on 10/29/19 @ 9:00am.  His office is located at [facility] 9th floor.    -Please follow up with your PMD Dr. Patiño on 10/22/19 @ 12:45pm   The office is located at [address] [place] [address] [phone]    -Please follow up with your urologist Dr. Vasiliy Silva on 10/23/19 @ 9:00am.  Your visiting nurse will draw some blood tomorrow to monitor your kidney function and fax results to Dr. Silva

## 2019-10-17 NOTE — DISCHARGE NOTE NURSING/CASE MANAGEMENT/SOCIAL WORK - NSDCPEEMAIL_GEN_ALL_CORE
[facility] for Tobacco Control email [email or web address]

## 2019-10-17 NOTE — DISCHARGE NOTE PROVIDER - NSDCFUADDINST_GEN_ALL_CORE_FT
-No driving or strenuous activity for 6 weeks, or until cleared by your surgeon.  Avoid lifting >10lbs.   -Continue to walk daily as tolerated and use your incentive spirometer every hour.  -Gently clean your incision(s) with anti-bacterial soap and water, pat dry and leave open to air.  We recommend liquid Dial.    -Call your doctor if you have shortness of breath, chest pain not relieved by pain medication, dizziness, fever >101.5, or increased redness/drainage from your incision.

## 2019-10-17 NOTE — DISCHARGE NOTE NURSING/CASE MANAGEMENT/SOCIAL WORK - PATIENT PORTAL LINK FT
You can access the FollowMyHealth Patient Portal offered by [facility] by registering at the following website: [email or web address]. By joining [employer] [facility] [employer]’s FollowMyHealth portal, you will also be able to view your health information using other applications (apps) compatible with our system.

## 2019-10-24 VITALS — BODY MASS INDEX: 28.73 KG/M2 | WEIGHT: 185.19 LBS | HEIGHT: 67.48 IN

## 2019-10-24 PROBLEM — Z86.79 HISTORY OF HYPERTENSION: Status: RESOLVED | Noted: 2019-10-24 | Resolved: 2019-10-24

## 2019-10-24 PROBLEM — Z87.39 HISTORY OF GOUT: Status: RESOLVED | Noted: 2019-10-24 | Resolved: 2019-10-24

## 2019-10-24 PROBLEM — Z86.39 HISTORY OF HYPERLIPIDEMIA: Status: RESOLVED | Noted: 2019-10-24 | Resolved: 2019-10-24

## 2019-10-24 PROBLEM — Z09 POSTOP CHECK: Status: ACTIVE | Noted: 2019-10-24

## 2019-10-24 PROBLEM — Z86.79 H/O RENAL ARTERY STENOSIS: Status: RESOLVED | Noted: 2019-10-24 | Resolved: 2019-10-24

## 2019-10-24 PROBLEM — Z87.891 FORMER SMOKER: Status: ACTIVE | Noted: 2019-10-24

## 2019-10-24 PROBLEM — Z95.1 S/P CABG X 2: Status: ACTIVE | Noted: 2019-10-24

## 2019-10-24 PROBLEM — Z95.5 PRESENCE OF STENT IN LAD CORONARY ARTERY: Status: RESOLVED | Noted: 2019-10-24 | Resolved: 2019-10-24

## 2019-10-24 PROBLEM — I25.10 CAD (CORONARY ARTERY DISEASE): Status: ACTIVE | Noted: 2019-10-24

## 2019-10-24 RX ORDER — FINASTERIDE 5 MG/1
5 TABLET, FILM COATED ORAL
Qty: 90 | Refills: 3 | Status: ACTIVE | COMMUNITY

## 2019-10-24 RX ORDER — ROSUVASTATIN CALCIUM 10 MG/1
10 TABLET, FILM COATED ORAL
Qty: 30 | Refills: 5 | Status: ACTIVE | COMMUNITY

## 2019-10-24 RX ORDER — ALLOPURINOL 300 MG/1
300 TABLET ORAL TWICE DAILY
Refills: 0 | Status: ACTIVE | COMMUNITY

## 2019-10-24 RX ORDER — ASPIRIN 81 MG
81 TABLET, DELAYED RELEASE (ENTERIC COATED) ORAL DAILY
Qty: 1 | Refills: 5 | Status: ACTIVE | COMMUNITY

## 2019-10-24 RX ORDER — METOPROLOL TARTRATE 50 MG/1
50 TABLET ORAL
Refills: 0 | Status: ACTIVE | COMMUNITY

## 2019-10-24 RX ORDER — TAMSULOSIN HYDROCHLORIDE 0.4 MG/1
0.4 CAPSULE ORAL
Qty: 30 | Refills: 2 | Status: ACTIVE | COMMUNITY

## 2019-10-24 RX ORDER — MULTIVIT-MIN/FOLIC/VIT K/LYCOP 400-300MCG
500 TABLET ORAL DAILY
Refills: 0 | Status: ACTIVE | COMMUNITY

## 2019-10-28 ENCOUNTER — FORM ENCOUNTER (OUTPATIENT)
Age: 79
End: 2019-10-28

## 2019-10-29 ENCOUNTER — APPOINTMENT (OUTPATIENT)
Dept: CARDIOTHORACIC SURGERY | Facility: CLINIC | Age: 79
End: 2019-10-29
Payer: MEDICARE

## 2019-10-29 ENCOUNTER — APPOINTMENT (OUTPATIENT)
Dept: HEART AND VASCULAR | Facility: CLINIC | Age: 79
End: 2019-10-29
Payer: MEDICARE

## 2019-10-29 ENCOUNTER — OUTPATIENT (OUTPATIENT)
Dept: OUTPATIENT SERVICES | Facility: HOSPITAL | Age: 79
LOS: 1 days | End: 2019-10-29
Payer: MEDICARE

## 2019-10-29 VITALS
HEIGHT: 67 IN | BODY MASS INDEX: 27.47 KG/M2 | OXYGEN SATURATION: 98 % | RESPIRATION RATE: 18 BRPM | HEART RATE: 57 BPM | WEIGHT: 175 LBS | DIASTOLIC BLOOD PRESSURE: 68 MMHG | TEMPERATURE: 97.2 F | SYSTOLIC BLOOD PRESSURE: 158 MMHG

## 2019-10-29 VITALS — DIASTOLIC BLOOD PRESSURE: 65 MMHG | OXYGEN SATURATION: 98 % | HEART RATE: 60 BPM | SYSTOLIC BLOOD PRESSURE: 123 MMHG

## 2019-10-29 DIAGNOSIS — Z86.79 PERSONAL HISTORY OF OTHER DISEASES OF THE CIRCULATORY SYSTEM: ICD-10-CM

## 2019-10-29 DIAGNOSIS — Z95.1 PRESENCE OF AORTOCORONARY BYPASS GRAFT: ICD-10-CM

## 2019-10-29 DIAGNOSIS — Z86.39 PERSONAL HISTORY OF OTHER ENDOCRINE, NUTRITIONAL AND METABOLIC DISEASE: ICD-10-CM

## 2019-10-29 DIAGNOSIS — Z87.891 PERSONAL HISTORY OF NICOTINE DEPENDENCE: ICD-10-CM

## 2019-10-29 DIAGNOSIS — Z09 ENCOUNTER FOR FOLLOW-UP EXAMINATION AFTER COMPLETED TREATMENT FOR CONDITIONS OTHER THAN MALIGNANT NEOPLASM: ICD-10-CM

## 2019-10-29 DIAGNOSIS — Z86.79 PERSONAL HISTORY OF OTHER DISEASES OF THE CIRCULATORY SYSTEM: Chronic | ICD-10-CM

## 2019-10-29 DIAGNOSIS — Z87.39 PERSONAL HISTORY OF OTHER DISEASES OF THE MUSCULOSKELETAL SYSTEM AND CONNECTIVE TISSUE: ICD-10-CM

## 2019-10-29 DIAGNOSIS — I25.10 ATHEROSCLEROTIC HEART DISEASE OF NATIVE CORONARY ARTERY W/OUT ANGINA PECTORIS: ICD-10-CM

## 2019-10-29 DIAGNOSIS — Z95.5 PRESENCE OF CORONARY ANGIOPLASTY IMPLANT AND GRAFT: Chronic | ICD-10-CM

## 2019-10-29 DIAGNOSIS — Z95.5 PRESENCE OF CORONARY ANGIOPLASTY IMPLANT AND GRAFT: ICD-10-CM

## 2019-10-29 PROCEDURE — 99214 OFFICE O/P EST MOD 30 MIN: CPT

## 2019-10-29 PROCEDURE — 99024 POSTOP FOLLOW-UP VISIT: CPT

## 2019-10-29 PROCEDURE — 71046 X-RAY EXAM CHEST 2 VIEWS: CPT | Mod: 26

## 2019-10-29 PROCEDURE — 71046 X-RAY EXAM CHEST 2 VIEWS: CPT

## 2019-11-06 ENCOUNTER — APPOINTMENT (OUTPATIENT)
Dept: CARDIOTHORACIC SURGERY | Facility: CLINIC | Age: 79
End: 2019-11-06
Payer: MEDICARE

## 2019-11-06 VITALS
SYSTOLIC BLOOD PRESSURE: 157 MMHG | WEIGHT: 172 LBS | TEMPERATURE: 96 F | HEIGHT: 67 IN | RESPIRATION RATE: 18 BRPM | BODY MASS INDEX: 27 KG/M2 | OXYGEN SATURATION: 97 % | DIASTOLIC BLOOD PRESSURE: 67 MMHG | HEART RATE: 69 BPM

## 2019-11-06 PROCEDURE — 99024 POSTOP FOLLOW-UP VISIT: CPT

## 2019-11-06 RX ORDER — AMIODARONE HYDROCHLORIDE 200 MG/1
200 TABLET ORAL DAILY
Qty: 30 | Refills: 0 | Status: COMPLETED | COMMUNITY
End: 2019-11-06

## 2019-11-06 RX ORDER — APIXABAN 2.5 MG/1
2.5 TABLET, FILM COATED ORAL
Qty: 60 | Refills: 0 | Status: COMPLETED | COMMUNITY
End: 2019-11-06

## 2019-11-06 RX ORDER — OXYCODONE HYDROCHLORIDE AND ACETAMINOPHEN 5; 325 MG/1; MG/1
5-325 TABLET ORAL
Refills: 0 | Status: COMPLETED | COMMUNITY
End: 2019-11-06

## 2019-11-12 ENCOUNTER — APPOINTMENT (OUTPATIENT)
Dept: CARDIOTHORACIC SURGERY | Facility: CLINIC | Age: 79
End: 2019-11-12
Payer: MEDICARE

## 2019-11-12 VITALS
DIASTOLIC BLOOD PRESSURE: 56 MMHG | BODY MASS INDEX: 27 KG/M2 | TEMPERATURE: 96.4 F | WEIGHT: 172 LBS | OXYGEN SATURATION: 96 % | HEIGHT: 67 IN | SYSTOLIC BLOOD PRESSURE: 113 MMHG | RESPIRATION RATE: 18 BRPM | HEART RATE: 72 BPM

## 2019-11-12 PROCEDURE — C1713: CPT

## 2019-11-12 PROCEDURE — 87070 CULTURE OTHR SPECIMN AEROBIC: CPT

## 2019-11-12 PROCEDURE — 99285 EMERGENCY DEPT VISIT HI MDM: CPT | Mod: 25

## 2019-11-12 PROCEDURE — 93880 EXTRACRANIAL BILAT STUDY: CPT

## 2019-11-12 PROCEDURE — P9035: CPT

## 2019-11-12 PROCEDURE — 80053 COMPREHEN METABOLIC PANEL: CPT

## 2019-11-12 PROCEDURE — 82436 ASSAY OF URINE CHLORIDE: CPT

## 2019-11-12 PROCEDURE — 82803 BLOOD GASES ANY COMBINATION: CPT

## 2019-11-12 PROCEDURE — 82330 ASSAY OF CALCIUM: CPT

## 2019-11-12 PROCEDURE — 36415 COLL VENOUS BLD VENIPUNCTURE: CPT

## 2019-11-12 PROCEDURE — 82553 CREATINE MB FRACTION: CPT

## 2019-11-12 PROCEDURE — C1894: CPT

## 2019-11-12 PROCEDURE — 81001 URINALYSIS AUTO W/SCOPE: CPT

## 2019-11-12 PROCEDURE — 94640 AIRWAY INHALATION TREATMENT: CPT

## 2019-11-12 PROCEDURE — 85379 FIBRIN DEGRADATION QUANT: CPT

## 2019-11-12 PROCEDURE — 94660 CPAP INITIATION&MGMT: CPT

## 2019-11-12 PROCEDURE — 80048 BASIC METABOLIC PNL TOTAL CA: CPT

## 2019-11-12 PROCEDURE — 94002 VENT MGMT INPAT INIT DAY: CPT

## 2019-11-12 PROCEDURE — P9017: CPT

## 2019-11-12 PROCEDURE — 85610 PROTHROMBIN TIME: CPT

## 2019-11-12 PROCEDURE — 85730 THROMBOPLASTIN TIME PARTIAL: CPT

## 2019-11-12 PROCEDURE — 84132 ASSAY OF SERUM POTASSIUM: CPT

## 2019-11-12 PROCEDURE — 85384 FIBRINOGEN ACTIVITY: CPT

## 2019-11-12 PROCEDURE — 99024 POSTOP FOLLOW-UP VISIT: CPT

## 2019-11-12 PROCEDURE — 84300 ASSAY OF URINE SODIUM: CPT

## 2019-11-12 PROCEDURE — 86901 BLOOD TYPING SEROLOGIC RH(D): CPT

## 2019-11-12 PROCEDURE — C1769: CPT

## 2019-11-12 PROCEDURE — 87205 SMEAR GRAM STAIN: CPT

## 2019-11-12 PROCEDURE — P9045: CPT

## 2019-11-12 PROCEDURE — 82962 GLUCOSE BLOOD TEST: CPT

## 2019-11-12 PROCEDURE — 83935 ASSAY OF URINE OSMOLALITY: CPT

## 2019-11-12 PROCEDURE — 97161 PT EVAL LOW COMPLEX 20 MIN: CPT

## 2019-11-12 PROCEDURE — 71046 X-RAY EXAM CHEST 2 VIEWS: CPT

## 2019-11-12 PROCEDURE — C1889: CPT

## 2019-11-12 PROCEDURE — 85027 COMPLETE CBC AUTOMATED: CPT

## 2019-11-12 PROCEDURE — 71045 X-RAY EXAM CHEST 1 VIEW: CPT

## 2019-11-12 PROCEDURE — 86803 HEPATITIS C AB TEST: CPT

## 2019-11-12 PROCEDURE — 36430 TRANSFUSION BLD/BLD COMPNT: CPT

## 2019-11-12 PROCEDURE — 93005 ELECTROCARDIOGRAM TRACING: CPT

## 2019-11-12 PROCEDURE — 86923 COMPATIBILITY TEST ELECTRIC: CPT

## 2019-11-12 PROCEDURE — 83036 HEMOGLOBIN GLYCOSYLATED A1C: CPT

## 2019-11-12 PROCEDURE — 80061 LIPID PANEL: CPT

## 2019-11-12 PROCEDURE — 84443 ASSAY THYROID STIM HORMONE: CPT

## 2019-11-12 PROCEDURE — 83880 ASSAY OF NATRIURETIC PEPTIDE: CPT

## 2019-11-12 PROCEDURE — 86850 RBC ANTIBODY SCREEN: CPT

## 2019-11-12 PROCEDURE — 87389 HIV-1 AG W/HIV-1&-2 AB AG IA: CPT

## 2019-11-12 PROCEDURE — 84100 ASSAY OF PHOSPHORUS: CPT

## 2019-11-12 PROCEDURE — 93970 EXTREMITY STUDY: CPT

## 2019-11-12 PROCEDURE — 83735 ASSAY OF MAGNESIUM: CPT

## 2019-11-12 PROCEDURE — C1887: CPT

## 2019-11-12 PROCEDURE — 82550 ASSAY OF CK (CPK): CPT

## 2019-11-12 PROCEDURE — 83605 ASSAY OF LACTIC ACID: CPT

## 2019-11-12 PROCEDURE — 87075 CULTR BACTERIA EXCEPT BLOOD: CPT

## 2019-11-12 PROCEDURE — 84134 ASSAY OF PREALBUMIN: CPT

## 2019-11-12 PROCEDURE — 84484 ASSAY OF TROPONIN QUANT: CPT

## 2019-11-12 PROCEDURE — 97116 GAIT TRAINING THERAPY: CPT

## 2019-11-12 PROCEDURE — P9012: CPT

## 2019-11-12 PROCEDURE — 85025 COMPLETE CBC W/AUTO DIFF WBC: CPT

## 2019-11-12 PROCEDURE — 86900 BLOOD TYPING SEROLOGIC ABO: CPT

## 2019-11-12 PROCEDURE — P9016: CPT

## 2019-11-12 PROCEDURE — 84295 ASSAY OF SERUM SODIUM: CPT

## 2019-11-12 PROCEDURE — C8929: CPT

## 2019-11-12 RX ORDER — CIPROFLOXACIN HYDROCHLORIDE 250 MG/1
250 TABLET, FILM COATED ORAL
Qty: 14 | Refills: 0 | Status: COMPLETED | COMMUNITY
End: 2019-11-12

## 2019-11-27 RX ORDER — POTASSIUM CHLORIDE 750 MG/1
10 TABLET, EXTENDED RELEASE ORAL
Qty: 15 | Refills: 0 | Status: ACTIVE | COMMUNITY
Start: 1900-01-01 | End: 1900-01-01

## 2019-11-27 RX ORDER — FUROSEMIDE 20 MG/1
20 TABLET ORAL
Qty: 15 | Refills: 0 | Status: ACTIVE | COMMUNITY
Start: 1900-01-01 | End: 1900-01-01

## 2019-12-14 NOTE — PROGRESS NOTE ADULT - SUBJECTIVE AND OBJECTIVE BOX
Planned Date of Surgery:       10/8/19                                                                                                           Surgeon: Skip Kilgore    Procedure: OPCAB    HPI:  Patient denies any CP or SOB currently.   He had been having CP on and off for the past month, at times waking him up from sleep, CCS class IV symptoms.  He presented yesterday with 1 day of acute onset CP that "worried him".  He is now feeling better, IABP in place.  States that he had "a few sips of coffee, with a tiny amt of milk" at around 9am, and "a few sips of water" around 10am.  Otherwise he has not eaten since the day before yesterda.  In light of everything that happened yesterday he had no appetite yesterday and today.     PAST MEDICAL & SURGICAL HISTORY:  Gout  HLD (hyperlipidemia)  HTN (hypertension)  Renal artery stenosis  CAD (coronary artery disease)  H/O renal artery stenosis: s/p stent  S/P coronary artery stent placement      penicillin (Unknown)      Physical Exam  T(C): 36.8 (10-08-19 @ 06:00), Max: 37 (10-07-19 @ 20:50)  HR: 70 (10-08-19 @ 10:00) (58 - 70)  BP: 154/65 (10-08-19 @ 10:00) (93/54 - 154/65)  RR: 14 (10-08-19 @ 10:00) (12 - 21)  SpO2: 96% (10-08-19 @ 10:00) (93% - 100%)    GEN: NAD, looks comfortable.  Able to hold a conversation and seems calm.  A bit nervous but otherwise appropriate.    Psych: Mood appropriate  Neuro: A&Ox3.  No focal deficits.  Moving all extremities.   HEENT: No obvious abnormalities  CV: S1S2, regular, no murmurs appreciated.  No carotid bruits.  No JVD  Lungs: Clear B/L.  No wheezing, rales or rhonchi  ABD: Softly protuberant (baseline per patient), non-tender. +Bowel sounds  EXT: Warm and well perfused.  Trace peripheral edema around ankles.    Musculoskeletal: Moving all extremities with normal ROM, no joint swelling  PV: Bilateral DP and PT are dopplerable only, not palpable.       MEDICATIONS  (STANDING):  aspirin enteric coated 81 milliGRAM(s) Oral every 24 hours  atorvastatin 80 milliGRAM(s) Oral at bedtime  chlorhexidine 0.12% Liquid 10 milliLiter(s) Swish and Spit once  chlorhexidine 2% Cloths 1 Application(s) Topical <User Schedule>  chlorhexidine 4% Liquid 1 Application(s) Topical once  chlorhexidine 4% Liquid 1 Application(s) Topical once  heparin  Infusion 1000 Unit(s)/Hr (10 mL/Hr) IV Continuous <Continuous>  influenza   Vaccine 0.5 milliLiter(s) IntraMuscular once  metoprolol tartrate 12.5 milliGRAM(s) Oral every 12 hours    MEDICATIONS  (PRN):      On Beta Blocker? YES    Labs:                        14.1   11.41 )-----------( 148      ( 08 Oct 2019 04:17 )             39.7     10    136  |  101  |  13  ----------------------------<  99  4.1   |  24  |  0.82    Ca    9.3      08 Oct 2019 04:17  Mg     1.7     10-    TPro  7.2  /  Alb  4.1  /  TBili  1.0  /  DBili  x   /  AST  23  /  ALT  18  /  AlkPhos  72  10-07    PT/INR - ( 07 Oct 2019 22:05 )   PT: 13.6 sec;   INR: 1.20          PTT - ( 07 Oct 2019 22:05 )  PTT:34.4 sec  Urinalysis Basic - ( 07 Oct 2019 23:31 )    Color: Yellow / Appearance: Clear / S.010 / pH: x  Gluc: x / Ketone: NEGATIVE  / Bili: Negative / Urobili: 0.2 E.U./dL   Blood: x / Protein: NEGATIVE mg/dL / Nitrite: NEGATIVE   Leuk Esterase: NEGATIVE / RBC: < 5 /HPF / WBC < 5 /HPF   Sq Epi: x / Non Sq Epi: 0-5 /HPF / Bacteria: Present /HPF      Hemoglobin A1C, Whole Blood: 4.9 % (10-07-19 @ 22:05)      CARDIAC MARKERS ( 08 Oct 2019 04:17 )  x     / 0.05 ng/mL / x     / x     / x      CARDIAC MARKERS ( 07 Oct 2019 06:50 )  x     / <0.01 ng/mL / 114 U/L / x     / 2.8 ng/mL    EKG: < from: 12 Lead ECG (10.07.19 @ 16:59) >  Diagnosis Line Sinus rhythm with 1st degree AV block  Nonspecific ST and T wave abnormality    < end of copied text >      CXR:< from: Xray Chest 1 View- PORTABLE-Urgent (10.07.19 @ 07:43) >  Minimal focal atelectasis right lower lung. Lungs otherwise clear. No   lung consolidation or pleural effusion. No pneumothorax. Unremarkable   cardiac silhouette.    < end of copied text >      Cath Report: LM normal, 99% mLAD, pLCx 100% with L-L collaterals, mRCA 100% with R-R collaterals.  IABP was placed right groin.     Echo: Pending     PFT's: Not done.      Carotid Duplex: Pending    Consult in Chart?  YES   Consent in Chart? YES   Pre-op Orders Placed? YES   Blood Prodeucts Ordered? YES   NPO ordered? YES Planned Date of Surgery:       10/8/19                                                                                                           Surgeon: Skip Kilgore    Procedure: OPCAB    HPI:  Patient denies any CP or SOB currently.   He had been having CP on and off for the past month, at times waking him up from sleep, CCS class IV symptoms.  He presented yesterday with 1 day of acute onset CP that "worried him".  He is now feeling better, IABP in place.  States that he had "a few sips of coffee, with a tiny amt of milk" at around 9am, and "a few sips of water" around 10am.  Otherwise he has not eaten since the day before yesterday.  In light of everything that happened yesterday he had no appetite yesterday and today.     PAST MEDICAL & SURGICAL HISTORY:  Gout  HLD (hyperlipidemia)  HTN (hypertension)  Renal artery stenosis  CAD (coronary artery disease)  H/O renal artery stenosis: s/p stent  S/P coronary artery stent placement      penicillin (Unknown)      Physical Exam  T(C): 36.8 (10-08-19 @ 06:00), Max: 37 (10-07-19 @ 20:50)  HR: 70 (10-08-19 @ 10:00) (58 - 70)  BP: 154/65 (10-08-19 @ 10:00) (93/54 - 154/65)  RR: 14 (10-08-19 @ 10:00) (12 - 21)  SpO2: 96% (10-08-19 @ 10:00) (93% - 100%)    GEN: NAD, looks comfortable.  Able to hold a conversation and seems calm.  A bit nervous but otherwise appropriate.    Psych: Mood appropriate  Neuro: A&Ox3.  No focal deficits.  Moving all extremities.   HEENT: No obvious abnormalities  CV: S1S2, regular, no murmurs appreciated.  No carotid bruits.  No JVD  Lungs: Clear B/L.  No wheezing, rales or rhonchi  ABD: Softly protuberant (baseline per patient), non-tender. +Bowel sounds  EXT: Warm and well perfused.  Trace peripheral edema around ankles.    Musculoskeletal: Moving all extremities with normal ROM, no joint swelling  PV: Bilateral DP and PT are dopplerable only, not palpable.       MEDICATIONS  (STANDING):  aspirin enteric coated 81 milliGRAM(s) Oral every 24 hours  atorvastatin 80 milliGRAM(s) Oral at bedtime  chlorhexidine 0.12% Liquid 10 milliLiter(s) Swish and Spit once  chlorhexidine 2% Cloths 1 Application(s) Topical <User Schedule>  chlorhexidine 4% Liquid 1 Application(s) Topical once  chlorhexidine 4% Liquid 1 Application(s) Topical once  heparin  Infusion 1000 Unit(s)/Hr (10 mL/Hr) IV Continuous <Continuous>  influenza   Vaccine 0.5 milliLiter(s) IntraMuscular once  metoprolol tartrate 12.5 milliGRAM(s) Oral every 12 hours    MEDICATIONS  (PRN):      On Beta Blocker? YES    Labs:                        14.1   11.41 )-----------( 148      ( 08 Oct 2019 04:17 )             39.7     10    136  |  101  |  13  ----------------------------<  99  4.1   |  24  |  0.82    Ca    9.3      08 Oct 2019 04:17  Mg     1.7     10-    TPro  7.2  /  Alb  4.1  /  TBili  1.0  /  DBili  x   /  AST  23  /  ALT  18  /  AlkPhos  72  10-07    PT/INR - ( 07 Oct 2019 22:05 )   PT: 13.6 sec;   INR: 1.20          PTT - ( 07 Oct 2019 22:05 )  PTT:34.4 sec  Urinalysis Basic - ( 07 Oct 2019 23:31 )    Color: Yellow / Appearance: Clear / S.010 / pH: x  Gluc: x / Ketone: NEGATIVE  / Bili: Negative / Urobili: 0.2 E.U./dL   Blood: x / Protein: NEGATIVE mg/dL / Nitrite: NEGATIVE   Leuk Esterase: NEGATIVE / RBC: < 5 /HPF / WBC < 5 /HPF   Sq Epi: x / Non Sq Epi: 0-5 /HPF / Bacteria: Present /HPF      Hemoglobin A1C, Whole Blood: 4.9 % (10-07-19 @ 22:05)      CARDIAC MARKERS ( 08 Oct 2019 04:17 )  x     / 0.05 ng/mL / x     / x     / x      CARDIAC MARKERS ( 07 Oct 2019 06:50 )  x     / <0.01 ng/mL / 114 U/L / x     / 2.8 ng/mL    EKG: < from: 12 Lead ECG (10.07.19 @ 16:59) >  Diagnosis Line Sinus rhythm with 1st degree AV block  Nonspecific ST and T wave abnormality    < end of copied text >      CXR:< from: Xray Chest 1 View- PORTABLE-Urgent (10.07.19 @ 07:43) >  Minimal focal atelectasis right lower lung. Lungs otherwise clear. No   lung consolidation or pleural effusion. No pneumothorax. Unremarkable   cardiac silhouette.    < end of copied text >      Cath Report: LM normal, 99% mLAD, pLCx 100% with L-L collaterals, mRCA 100% with R-R collaterals.  IABP was placed right groin.     Echo: Pending     PFT's: Not done.      Carotid Duplex: Pending    Consult in Chart?  YES   Consent in Chart? YES   Pre-op Orders Placed? YES   Blood Prodeucts Ordered? YES   NPO ordered? YES yes

## 2019-12-17 ENCOUNTER — APPOINTMENT (OUTPATIENT)
Dept: CARDIOTHORACIC SURGERY | Facility: CLINIC | Age: 79
End: 2019-12-17
Payer: MEDICARE

## 2019-12-17 VITALS
SYSTOLIC BLOOD PRESSURE: 179 MMHG | OXYGEN SATURATION: 97 % | BODY MASS INDEX: 27.31 KG/M2 | WEIGHT: 174 LBS | HEART RATE: 63 BPM | HEIGHT: 67 IN | RESPIRATION RATE: 18 BRPM | TEMPERATURE: 97 F | DIASTOLIC BLOOD PRESSURE: 75 MMHG

## 2019-12-17 PROCEDURE — 99024 POSTOP FOLLOW-UP VISIT: CPT

## 2020-01-14 ENCOUNTER — APPOINTMENT (OUTPATIENT)
Dept: CARDIOTHORACIC SURGERY | Facility: CLINIC | Age: 80
End: 2020-01-14
Payer: MEDICARE

## 2020-01-14 VITALS
BODY MASS INDEX: 27.25 KG/M2 | RESPIRATION RATE: 18 BRPM | DIASTOLIC BLOOD PRESSURE: 75 MMHG | OXYGEN SATURATION: 97 % | HEART RATE: 64 BPM | WEIGHT: 174 LBS | SYSTOLIC BLOOD PRESSURE: 175 MMHG | TEMPERATURE: 96.7 F

## 2020-01-14 PROCEDURE — 99214 OFFICE O/P EST MOD 30 MIN: CPT

## 2020-01-14 RX ORDER — AMLODIPINE BESYLATE 10 MG/1
10 TABLET ORAL DAILY
Qty: 30 | Refills: 4 | Status: ACTIVE | COMMUNITY
Start: 2020-01-14 | End: 1900-01-01

## 2020-01-16 ENCOUNTER — FORM ENCOUNTER (OUTPATIENT)
Age: 80
End: 2020-01-16

## 2020-01-17 ENCOUNTER — OUTPATIENT (OUTPATIENT)
Dept: OUTPATIENT SERVICES | Facility: HOSPITAL | Age: 80
LOS: 1 days | End: 2020-01-17
Payer: MEDICARE

## 2020-01-17 DIAGNOSIS — Z95.5 PRESENCE OF CORONARY ANGIOPLASTY IMPLANT AND GRAFT: Chronic | ICD-10-CM

## 2020-01-17 DIAGNOSIS — Z86.79 PERSONAL HISTORY OF OTHER DISEASES OF THE CIRCULATORY SYSTEM: Chronic | ICD-10-CM

## 2020-01-17 DIAGNOSIS — I25.10 ATHEROSCLEROTIC HEART DISEASE OF NATIVE CORONARY ARTERY WITHOUT ANGINA PECTORIS: ICD-10-CM

## 2020-01-17 PROCEDURE — 93017 CV STRESS TEST TRACING ONLY: CPT

## 2020-01-17 PROCEDURE — A9505: CPT

## 2020-01-17 PROCEDURE — A9500: CPT

## 2020-01-17 PROCEDURE — 93016 CV STRESS TEST SUPVJ ONLY: CPT

## 2020-01-17 PROCEDURE — 78452 HT MUSCLE IMAGE SPECT MULT: CPT

## 2020-01-17 PROCEDURE — 78452 HT MUSCLE IMAGE SPECT MULT: CPT | Mod: 26

## 2020-01-17 PROCEDURE — 93018 CV STRESS TEST I&R ONLY: CPT

## 2020-02-18 ENCOUNTER — APPOINTMENT (OUTPATIENT)
Dept: CARDIOTHORACIC SURGERY | Facility: CLINIC | Age: 80
End: 2020-02-18
Payer: MEDICARE

## 2020-02-18 VITALS
DIASTOLIC BLOOD PRESSURE: 74 MMHG | HEART RATE: 65 BPM | WEIGHT: 175 LBS | OXYGEN SATURATION: 98 % | TEMPERATURE: 97 F | BODY MASS INDEX: 27.41 KG/M2 | RESPIRATION RATE: 18 BRPM | SYSTOLIC BLOOD PRESSURE: 165 MMHG

## 2020-02-18 PROCEDURE — 99213 OFFICE O/P EST LOW 20 MIN: CPT

## 2020-02-20 NOTE — PHYSICAL EXAM
[Sclera] : the sclera and conjunctiva were normal [Extraocular Movements] : extraocular movements were intact [PERRL With Normal Accommodation] : pupils were equal in size, round, and reactive to light [Neck Appearance] : the appearance of the neck was normal [Neck Cervical Mass (___cm)] : no neck mass was observed [Thyroid Diffuse Enlargement] : the thyroid was not enlarged [Thyroid Nodule] : there were no palpable thyroid nodules [Jugular Venous Distention Increased] : there was no jugular-venous distention [Auscultation Breath Sounds / Voice Sounds] : lungs were clear to auscultation bilaterally [Heart Rate And Rhythm] : heart rate was normal and rhythm regular [Heart Sounds] : normal S1 and S2 [Heart Sounds Gallop] : no gallops [Heart Sounds Pericardial Friction Rub] : no pericardial rub [Murmurs] : no murmurs [2+] : left 2+ [No Abnormalities] : the abdominal aorta was not enlarged and no bruit was heard [Bowel Sounds] : normal bowel sounds [Abdomen Soft] : soft [Abdomen Mass (___ Cm)] : no abdominal mass palpated [Abdomen Tenderness] : non-tender [No Spinal Tenderness] : no spinal tenderness [No CVA Tenderness] : no ~M costovertebral angle tenderness [Abnormal Walk] : normal gait [Musculoskeletal - Swelling] : no joint swelling seen [Motor Tone] : muscle strength and tone were normal [Nail Clubbing] : no clubbing  or cyanosis of the fingernails [Skin Turgor] : normal skin turgor [Skin Color & Pigmentation] : normal skin color and pigmentation [] : no rash [Sensation] : the sensory exam was normal to light touch and pinprick [Deep Tendon Reflexes (DTR)] : deep tendon reflexes were 2+ and symmetric [No Focal Deficits] : no focal deficits [Oriented To Time, Place, And Person] : oriented to person, place, and time [Impaired Insight] : insight and judgment were intact [Affect] : the affect was normal [FreeTextEntry1] : sternum stable, incision well healed [Right Carotid Bruit] : no bruit heard over the right carotid [Left Carotid Bruit] : no bruit heard over the left carotid [Right Femoral Bruit] : no bruit heard over the right femoral artery [Left Femoral Bruit] : no bruit heard over the left femoral artery

## 2020-02-20 NOTE — END OF VISIT
[FreeTextEntry3] : I personally performed the services described in the documentation, reviewed the documentation recorded by the scribe in my presence and it accurately and completely records my words and actions.\par

## 2020-02-20 NOTE — ASSESSMENT
[FreeTextEntry1] : 81 yo male status post OPCAB x 2 on 10/8/19 presents for 4 month follow up visit. He has recovered well. \par \par Plan; \par continue current medications\par follow up with Dr. JOON Langston\par follow up with PCP\par MAYTE from [facility] service

## 2020-02-20 NOTE — HISTORY OF PRESENT ILLNESS
[FreeTextEntry1] : 79 yo male status post OPCAB x 2 on 10/8/19 presents for 4 month follow up visit. He is doing very well, denies c/o chest pain, sob/casey, fever, lower extremity edema or palpitations. His right lower leg SVG incision has healed well. He  denies hospitalizations or additional cardiac procedures. NYHA 1.

## 2020-03-18 NOTE — PROCEDURE NOTE - AMOUNT OF FLUID OBTAINED
Babs Bowman requests that office return their call. The best time to reach her is Anytime. Patient is needing to get a test r/s. Thank you. 0

## 2021-12-17 ENCOUNTER — TRANSCRIPTION ENCOUNTER (OUTPATIENT)
Age: 81
End: 2021-12-17

## 2021-12-17 ENCOUNTER — EMERGENCY (EMERGENCY)
Facility: HOSPITAL | Age: 81
LOS: 1 days | Discharge: HOME | End: 2021-12-17
Attending: EMERGENCY MEDICINE | Admitting: EMERGENCY MEDICINE
Payer: MEDICARE

## 2021-12-17 VITALS
RESPIRATION RATE: 18 BRPM | DIASTOLIC BLOOD PRESSURE: 75 MMHG | SYSTOLIC BLOOD PRESSURE: 132 MMHG | TEMPERATURE: 99 F | OXYGEN SATURATION: 93 % | HEART RATE: 89 BPM

## 2021-12-17 VITALS
HEIGHT: 67.5 IN | HEART RATE: 69 BPM | DIASTOLIC BLOOD PRESSURE: 67 MMHG | OXYGEN SATURATION: 100 % | WEIGHT: 192.02 LBS | SYSTOLIC BLOOD PRESSURE: 130 MMHG | RESPIRATION RATE: 16 BRPM | TEMPERATURE: 99 F

## 2021-12-17 DIAGNOSIS — Z95.5 PRESENCE OF CORONARY ANGIOPLASTY IMPLANT AND GRAFT: Chronic | ICD-10-CM

## 2021-12-17 DIAGNOSIS — U07.1 COVID-19: ICD-10-CM

## 2021-12-17 DIAGNOSIS — I10 ESSENTIAL (PRIMARY) HYPERTENSION: ICD-10-CM

## 2021-12-17 DIAGNOSIS — E78.5 HYPERLIPIDEMIA, UNSPECIFIED: ICD-10-CM

## 2021-12-17 DIAGNOSIS — Z86.79 PERSONAL HISTORY OF OTHER DISEASES OF THE CIRCULATORY SYSTEM: Chronic | ICD-10-CM

## 2021-12-17 DIAGNOSIS — R53.83 OTHER FATIGUE: ICD-10-CM

## 2021-12-17 DIAGNOSIS — Z79.01 LONG TERM (CURRENT) USE OF ANTICOAGULANTS: ICD-10-CM

## 2021-12-17 DIAGNOSIS — Z88.0 ALLERGY STATUS TO PENICILLIN: ICD-10-CM

## 2021-12-17 DIAGNOSIS — Z79.82 LONG TERM (CURRENT) USE OF ASPIRIN: ICD-10-CM

## 2021-12-17 DIAGNOSIS — I25.10 ATHEROSCLEROTIC HEART DISEASE OF NATIVE CORONARY ARTERY WITHOUT ANGINA PECTORIS: ICD-10-CM

## 2021-12-17 LAB
ALBUMIN SERPL ELPH-MCNC: 4.3 G/DL — SIGNIFICANT CHANGE UP (ref 3.3–5)
ALP SERPL-CCNC: 100 U/L — SIGNIFICANT CHANGE UP (ref 40–120)
ALT FLD-CCNC: 25 U/L — SIGNIFICANT CHANGE UP (ref 10–45)
ANION GAP SERPL CALC-SCNC: 13 MMOL/L — SIGNIFICANT CHANGE UP (ref 5–17)
AST SERPL-CCNC: 28 U/L — SIGNIFICANT CHANGE UP (ref 10–40)
BASOPHILS # BLD AUTO: 0.01 K/UL — SIGNIFICANT CHANGE UP (ref 0–0.2)
BASOPHILS NFR BLD AUTO: 0.1 % — SIGNIFICANT CHANGE UP (ref 0–2)
BILIRUB SERPL-MCNC: 0.7 MG/DL — SIGNIFICANT CHANGE UP (ref 0.2–1.2)
BUN SERPL-MCNC: 17 MG/DL — SIGNIFICANT CHANGE UP (ref 7–23)
CALCIUM SERPL-MCNC: 8.8 MG/DL — SIGNIFICANT CHANGE UP (ref 8.4–10.5)
CHLORIDE SERPL-SCNC: 97 MMOL/L — SIGNIFICANT CHANGE UP (ref 96–108)
CO2 SERPL-SCNC: 22 MMOL/L — SIGNIFICANT CHANGE UP (ref 22–31)
CREAT SERPL-MCNC: 1.02 MG/DL — SIGNIFICANT CHANGE UP (ref 0.5–1.3)
CRP SERPL-MCNC: 46 MG/L — HIGH (ref 0–4)
EOSINOPHIL # BLD AUTO: 0 K/UL — SIGNIFICANT CHANGE UP (ref 0–0.5)
EOSINOPHIL NFR BLD AUTO: 0 % — SIGNIFICANT CHANGE UP (ref 0–6)
FERRITIN SERPL-MCNC: 276 NG/ML — SIGNIFICANT CHANGE UP (ref 30–400)
GLUCOSE SERPL-MCNC: 115 MG/DL — HIGH (ref 70–99)
HCT VFR BLD CALC: 46.7 % — SIGNIFICANT CHANGE UP (ref 39–50)
HGB BLD-MCNC: 16.2 G/DL — SIGNIFICANT CHANGE UP (ref 13–17)
IMM GRANULOCYTES NFR BLD AUTO: 0.7 % — SIGNIFICANT CHANGE UP (ref 0–1.5)
LYMPHOCYTES # BLD AUTO: 0.5 K/UL — LOW (ref 1–3.3)
LYMPHOCYTES # BLD AUTO: 7.2 % — LOW (ref 13–44)
MCHC RBC-ENTMCNC: 31.4 PG — SIGNIFICANT CHANGE UP (ref 27–34)
MCHC RBC-ENTMCNC: 34.7 GM/DL — SIGNIFICANT CHANGE UP (ref 32–36)
MCV RBC AUTO: 90.5 FL — SIGNIFICANT CHANGE UP (ref 80–100)
MONOCYTES # BLD AUTO: 0.77 K/UL — SIGNIFICANT CHANGE UP (ref 0–0.9)
MONOCYTES NFR BLD AUTO: 11 % — SIGNIFICANT CHANGE UP (ref 2–14)
NEUTROPHILS # BLD AUTO: 5.65 K/UL — SIGNIFICANT CHANGE UP (ref 1.8–7.4)
NEUTROPHILS NFR BLD AUTO: 81 % — HIGH (ref 43–77)
NRBC # BLD: 0 /100 WBCS — SIGNIFICANT CHANGE UP (ref 0–0)
NT-PROBNP SERPL-SCNC: 822 PG/ML — HIGH (ref 0–300)
PLATELET # BLD AUTO: 112 K/UL — LOW (ref 150–400)
POTASSIUM SERPL-MCNC: 3.7 MMOL/L — SIGNIFICANT CHANGE UP (ref 3.5–5.3)
POTASSIUM SERPL-SCNC: 3.7 MMOL/L — SIGNIFICANT CHANGE UP (ref 3.5–5.3)
PROCALCITONIN SERPL-MCNC: 0.11 NG/ML — HIGH (ref 0.02–0.1)
PROT SERPL-MCNC: 7.5 G/DL — SIGNIFICANT CHANGE UP (ref 6–8.3)
RBC # BLD: 5.16 M/UL — SIGNIFICANT CHANGE UP (ref 4.2–5.8)
RBC # FLD: 14.8 % — HIGH (ref 10.3–14.5)
SODIUM SERPL-SCNC: 132 MMOL/L — LOW (ref 135–145)
TROPONIN T SERPL-MCNC: 0.01 NG/ML — SIGNIFICANT CHANGE UP (ref 0–0.01)
WBC # BLD: 6.98 K/UL — SIGNIFICANT CHANGE UP (ref 3.8–10.5)
WBC # FLD AUTO: 6.98 K/UL — SIGNIFICANT CHANGE UP (ref 3.8–10.5)

## 2021-12-17 PROCEDURE — 86140 C-REACTIVE PROTEIN: CPT

## 2021-12-17 PROCEDURE — 99285 EMERGENCY DEPT VISIT HI MDM: CPT | Mod: 25

## 2021-12-17 PROCEDURE — 83880 ASSAY OF NATRIURETIC PEPTIDE: CPT

## 2021-12-17 PROCEDURE — 71045 X-RAY EXAM CHEST 1 VIEW: CPT

## 2021-12-17 PROCEDURE — 93005 ELECTROCARDIOGRAM TRACING: CPT

## 2021-12-17 PROCEDURE — 84145 PROCALCITONIN (PCT): CPT

## 2021-12-17 PROCEDURE — 85025 COMPLETE CBC W/AUTO DIFF WBC: CPT

## 2021-12-17 PROCEDURE — 99284 EMERGENCY DEPT VISIT MOD MDM: CPT | Mod: CS

## 2021-12-17 PROCEDURE — 36415 COLL VENOUS BLD VENIPUNCTURE: CPT

## 2021-12-17 PROCEDURE — 82728 ASSAY OF FERRITIN: CPT

## 2021-12-17 PROCEDURE — 84484 ASSAY OF TROPONIN QUANT: CPT

## 2021-12-17 PROCEDURE — 71045 X-RAY EXAM CHEST 1 VIEW: CPT | Mod: 26

## 2021-12-17 PROCEDURE — 80053 COMPREHEN METABOLIC PANEL: CPT

## 2021-12-17 NOTE — ED PROVIDER NOTE - ATTENDING CONTRIBUTION TO CARE
82 yo M with hx of CAD, CABG, HTN, HLD, AFib on Eliquis p/w cough, fatigue, sob, decreased appetite.  No chest pain.  VSS.  Lungs clear, slightly tachypneic.  Afebrile.  + COVID today prior to arrival.  Likely viral syndrome but will ensure not putting undo strain on chronic heart conditions.  Plan labs, cxr, ekg and reassess.

## 2021-12-17 NOTE — ED PROVIDER NOTE - CLINICAL SUMMARY MEDICAL DECISION MAKING FREE TEXT BOX
pt sent from [facility] [place] for +covid to get mab - pt w/hx of cad, htn, hld, c/o feeling unwell and lack of energy, decreased appetite, no actual cp/sob, well appearing, non toxic, pulse on in 92-94% on RA, labs consistent w/covid, cxr clear, pt explained that given nation wide shortage of mab - unable to give it in ed but form filled out for possible outpatient infusion to be scheduled, symptom control and supportive care discussed, strict return precautions given

## 2021-12-17 NOTE — ED PROVIDER NOTE - PATIENT PORTAL LINK FT
You can access the FollowMyHealth Patient Portal offered by [facility] by registering at the following website: [email or web address]. By joining [employer]’s FollowMyHealth portal, you will also be able to view your health information using other applications (apps) compatible with our system.

## 2021-12-17 NOTE — ED PROVIDER NOTE - RESPIRATORY, MLM
Breath sounds clear and equal bilaterally. no rales, no rhonchi, no wheezes b/l, ambulatory pulse ox 92% RA

## 2021-12-17 NOTE — ED PROVIDER NOTE - NSFOLLOWUPINSTRUCTIONS_ED_ALL_ED_FT
COVID-19 (Coronavirus Disease 2019)  WHAT YOU NEED TO KNOW:  COVID-19 is the disease caused by a coronavirus first discovered in December 2019. Coronaviruses generally cause upper respiratory (nose, throat, and lung) infections, such as a cold. The 2019 virus spreads quickly and easily. It can be spread starting 2 to 3 days before symptoms even begin.  DISCHARGE INSTRUCTIONS:  Call your local emergency number (911 in the ) if:   •You have trouble breathing or shortness of breath at rest.  •You have chest pain or pressure that lasts longer than 5 minutes.  •You become confused or hard to wake.  •Your lips or face are blue.  Return to the emergency department if:   •You have a fever of 104°F (40°C) or higher.  Call your doctor if:   •You have symptoms of COVID-19.  •You have questions or concerns about your condition or care.  Medicines: You may need any of the following:   •Decongestants help reduce nasal congestion and help you breathe more easily. If you take decongestant pills, they may make you feel restless or cause problems with your sleep. Do not use decongestant sprays for more than a few days.  •Cough suppressants help reduce coughing. Ask your healthcare provider which type of cough medicine is best for you.  •Acetaminophen decreases pain and fever. It is available without a doctor's order. Ask how much to take and how often to take it. Follow directions. Read the labels of all other medicines you are using to see if they also contain acetaminophen, or ask your doctor or pharmacist. Acetaminophen can cause liver damage if not taken correctly. Do not use more than 4 grams (4,000 milligrams) total of acetaminophen in one day.   •NSAIDs, such as ibuprofen, help decrease swelling, pain, and fever. This medicine is available with or without a doctor's order. NSAIDs can cause stomach bleeding or kidney problems in certain people. If you take blood thinner medicine, always ask your healthcare provider if NSAIDs are safe for you. Always read the medicine label and follow directions.  •Blood thinners help prevent blood clots. Clots can cause strokes, heart attacks, and death. The following are general safety guidelines to follow while you are taking a blood thinner:?Watch for bleeding and bruising while you take blood thinners. Watch for bleeding from your gums or nose. Watch for blood in your urine and bowel movements. Use a soft washcloth on your skin, and a soft toothbrush to brush your teeth. This can keep your skin and gums from bleeding. If you shave, use an electric shaver. Do not play contact sports.   ?Tell your dentist and other healthcare providers that you take a blood thinner. Wear a bracelet or necklace that says you take this medicine.   ?Do not start or stop any other medicines unless your healthcare provider tells you to. Many medicines cannot be used with blood thinners.  ?Take your blood thinner exactly as prescribed by your healthcare provider. Do not skip does or take less than prescribed. Tell your provider right away if you forget to take your blood thinner, or if you take too much.  ?Warfarin is a blood thinner that you may need to take. The following are things you should be aware of if you take warfarin: ?Foods and medicines can affect the amount of warfarin in your blood. Do not make major changes to your diet while you take warfarin. Warfarin works best when you eat about the same amount of vitamin K every day. Vitamin K is found in green leafy vegetables and certain other foods. Ask for more information about what to eat when you are taking warfarin.  ?You will need to see your healthcare provider for follow-up visits when you are on warfarin. You will need regular blood tests. These tests are used to decide how much medicine you need.   •Take your medicine as directed. Contact your healthcare provider if you think your medicine is not helping or if you have side effects. Tell him or her if you are allergic to any medicine. Keep a list of the medicines, vitamins, and herbs you take. Include the amounts, and when and why you take them. Bring the list or the pill bottles to follow-up visits. Carry your medicine list with you in case of an emergency.  What you need to know about variants: The virus has changed into several new forms (called variants) since it was discovered. The variants may be more contagious (easily spread) than the original form. Some may also cause more severe illness than others.  What you need to know about COVID-19 vaccines: Healthcare providers recommend a COVID-19 vaccine, even if you have already had COVID-19. You are considered fully vaccinated against COVID-19 two weeks after the final dose of any COVID-19 vaccine. Let your healthcare provider know when you have received the final dose of the vaccine. Make a copy of your vaccination card. Keep the original with you in case you need to show it. Keep the copy in a safe place.  •COVID-19 vaccines are given as a shot in 1 or 2 doses. The vaccine is recommended for everyone 12 years or older  •A third dose is recommended for adults with a weakened immune system who get a 2-dose vaccine. The third dose is given at least 28 days after the second.  •A booster (additional) dose is being recommended for other people as well. This is usually given 6 months after the initial doses are complete. Continue social distancing and other measures, even after you get the vaccine. Although it is not common, you can become infected after you get the vaccine. You may also be able to pass the virus to others without knowing you are infected.  •After you get the vaccine, check local, national, and international travel rules. You may need to be tested before you travel. Some countries require proof of a negative test before you travel. You may also need to quarantine after you return.  How the 2019 coronavirus spreads:   •Droplets are the main way all coronaviruses spread. The virus travels in droplets that form when a person talks, sings, coughs, or sneezes. The droplets can also float in the air for minutes or hours. Infection happens when you breathe in the droplets or get them in your eyes or nose. Close personal contact with an infected person increases your risk for infection. This means being within 6 feet (2 meters) of the person for at least 15 minutes over 24 hours.  •Person-to-person contact can spread the virus. For example, a person with the virus on his or her hands can spread it by shaking hands with someone.  •The virus can stay on objects and surfaces for up to 3 days. You may become infected by touching the object or surface and then touching your eyes or mouth.  Help lower the risk for COVID-19:   •Wash your hands often throughout the day. Use soap and water. Rub your soapy hands together, lacing your fingers, for at least 20 seconds. Rinse with warm, running water. Dry your hands with a clean towel or paper towel. Use hand  that contains alcohol if soap and water are not available. Teach children how to wash their hands and use hand .  Handwashing  •Cover sneezes and coughs. Turn your face away and cover your mouth and nose with a tissue. Throw the tissue away. Use the bend of your arm if a tissue is not available. Then wash your hands well with soap and water or use hand . Teach children how to cover a cough or sneeze.  •Wear a face covering (mask) when needed. Use a cloth covering with at least 2 layers. You can also create layers by putting a cloth covering over a disposable non-medical mask. Cover your mouth and your nose.  How to Wear a Face Covering (Mask)  •Follow worldwide, national, and local social distancing guidelines. Keep at least 6 feet (2 meters) between you and others.  •Try not to touch your face. If you get the virus on your hands, you can transfer it to your eyes, nose, or mouth and become infected. You can also transfer it to objects, surfaces, or people.  •Clean and disinfect high-touch surfaces and objects often. Use disinfecting wipes, or make a solution of 4 teaspoons of bleach in 1 quart (4 cups) of water.  •Ask about other vaccines you may need. Get the influenza (flu) vaccine as soon as recommended each year, usually starting in September or October. Get the pneumonia vaccine if recommended. Your healthcare provider can tell you if you should also get other vaccines, and when to get them.  Prevent COVID-19 Infection  Follow social distancing guidelines: National and local social distancing rules vary. Rules and restrictions may change over time as restrictions are lifted. The following are general guidelines:   •Stay home if you are sick or think you may have COVID-19. It is important to stay home if you are waiting for a testing appointment or for test results.  •Avoid close physical contact with anyone who does not live in your home. Do not shake hands with, hug, or kiss a person as a greeting. If you must use public transportation (such as a bus or subway), try to sit or stand away from others. Wear your face covering.  •Avoid in-person gatherings and crowds. Attend virtually if possible.  For more information:   •Centers for Disease Control and Prevention  [address] [place] [address]  Phone: 1-560.666.5192  Web Address: http://www.cdc.gov

## 2021-12-17 NOTE — ED PROVIDER NOTE - OBJECTIVE STATEMENT
The pt is a 82 y/o M, pmh HTN, HLD, CAD, sent from [facility] [place] for MAB - tested +covid. Pt c/o lack of energy, weakness, decreased appetite, body aches and cough x few d. Fully vaccinated for covid. Denies fever, cp, sob, n/v/d, abd pain, dizziness, syncope.

## 2022-07-16 NOTE — DIETITIAN INITIAL EVALUATION ADULT. - NUTRITION CONSULT
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 C : post treatment: patient  tested Negative for Hep C    HTN (hypertension)     Medial meniscus tear ' 90's  Right    Prostate cancer s/p RT    Renal cell carcinoma s/p b/l nephrectomy and renal transplant in 2018    Type 2 diabetes mellitus dx: '88    Ureteral stricture of kidney transplant 2018    
no

## 2022-10-03 NOTE — ED ADULT NURSE NOTE - TEMPLATE LIST FOR HEAD TO TOE ASSESSMENT
Occupational Therapy  Facility/Department: . [facility]  Occupational Therapy Initial Assessment    Name: Ada Calles  : 1948  MRN: [id number]  Date of Service: 10/3/2022    Discharge Recommendations:  Subacute/Skilled Nursing Facility  OT Equipment Recommendations  Equipment Needed:  (if declines SNF will need shower chair and heavy duty [facility])       Patient Diagnosis(es): The primary encounter diagnosis was Leg swelling. Diagnoses of Gangrene ([address] and Cellulitis of lower extremity, unspecified laterality were also pertinent to this visit. Past Medical History:  has a past medical history of Allergic rhinitis, Arthritis, Bladder fistula, C. difficile diarrhea, Cellulitis of right lower extremity, CHF (congestive heart failure) ([address] Dental disease, Diabetes ([address] Diverticulitis, Dizziness, DVT of lower extremity, bilateral ([address] GERD (gastroesophageal reflux disease), Headache, Hearing loss, Hematuria, Hx of blood clots, Hyperlipidemia, Hypertension, Lung disease, MONIQUE (obstructive sleep apnea), Pancreatitis ([address] Pulmonary emboli ([address] Rash, Sleep apnea, and Tinnitus. Past Surgical History:  has a past surgical history that includes Tibia fracture surgery (Right, ); Cholecystectomy, open (N/A, 13); Cystocopy (12/10/13); Cystoscopy (14); other surgical history (14); Colonoscopy (); Colonoscopy (2013); Colonoscopy (14); colostomy (2014); Colon surgery; Abdomen surgery (2014); hernia repair (2015); pr injection hip arthrogram,anesth (Right, 2018); epidural steroid injection (Right, 2019); epidural steroid injection (Right, 2019); Cardioversion (2019); vascular surgery (Left, 2021); and vascular surgery (Right, 05/10/2021). Assessment   Performance deficits / Impairments: Decreased functional mobility ; Decreased strength;Decreased endurance;Decreased safe awareness;Decreased ADL status; Decreased sensation;Decreased balance;Decreased cognition;Decreased posture;Decreased high-level IADLs  Assessment: Pt is a 67 yo male admitted to [facility] with a dx of RLE cellulitis. Pt reporting PTA he was living in a 1 story home with 5 JOHN and IND with ADLs with assist provided for IADLs and use of cane for functional mobility. Pt also stating he is the primary caregiver to his wife and is very motivated to return home. Currently pt is requiring max Ax2 for STS and mod Ax2 for SPT bed<>chair. Pt is very gaurded towards mobility stating \"I feel like I am going to fall\" during transfers. Anticipate pt requiring mod-max A for toileting and LB ADLs. Due to pt's currently level of assist required for mobility, pt would benefit from continue OT services in SNF prior to return home. If pt declines SNF, he will require 24H supv/assist + HHOT + nursing aid. REQUIRES OT FOLLOW-UP: Yes  Activity Tolerance  Activity Tolerance: Patient limited by fatigue;Patient limited by pain  Activity Tolerance Comments: Pt reporting 10/10 pain in BLEs after mobility.  RN made aware, vitals [facility]        Plan   Occupational Therapy Plan  Times Per Week: 3-5x  Current Treatment Recommendations: Strengthening, Balance training, Gait training, Functional mobility training, Endurance training, Stair training, Safety education & training, Patient/Caregiver education & training, Pain management, Equipment evaluation, education, & procurement, Positioning, Self-Care / ADL, Home management training     Restrictions  Restrictions/Precautions  Restrictions/Precautions: Contact Precautions, Fall Risk  Required Braces or Orthoses?: No    Subjective   General  Patient assessed for rehabilitation services?: Yes  reports 8-9/10 pain in legs, states he has had pain meds recently  Social/Functional History  Social/Functional History  Lives With:  (Wife is sick and may be terminal and he helps take care of her)  Type of Home: House  Home Layout: Two level, Able to Live on Main level with bedroom/bathroom, Laundry in basement (Does not go up or downstairs)  Home Access: Stairs to enter with rails  Entrance Stairs - Number of Steps: 5 JOHN  Entrance Stairs - Rails: Both  Bathroom Shower/Tub: Walk-in shower  Bathroom Toilet: Handicap height  Bathroom Equipment: Grab bars in shower, Grab bars around toilet  Home Equipment: Hospital bed, Walker, rolling, Florie Brew, quad, Grab bars  Has the patient had two or more falls in the past year or any fall with injury in the past year?: No  Receives Help From: Home health, Family, Neighbor ([facility] days/week, Neighbor mows grass, step daughter does laundry)  ADL Assistance: Independent (Able to do it but was in a lot of pain)  Homemaking Assistance: Independent (Can cook and clean if he needs to)  [employer] Responsibilities: No  Ambulation Assistance: Independent (Uses cane in community)  Transfer Assistance: Independent (Has hospital bed)  Active : Yes  Mode of Transportation: Truck       Objective   Heart Rate: 69  Heart Rate Source: Monitor  BP: (!) 134/94  BP Location: Right upper arm  BP Method: Other (Comment)  Patient Position: Semi fowlers  MAP (Calculated): 107.33  Resp: 16  SpO2: 95 %  O2 Device: Nasal cannula  Comment: Vitals: O2 98% on 2L O2, HR 76, /86          Observation/Palpation  Posture: Good  Safety Devices  Type of Devices: Left in chair;Call light within reach; Chair alarm in place;Gait belt;Patient at risk for falls  Restraints  Restraints Initially in Place: No  Bed Mobility Training  Bed Mobility Training: Yes  Overall Level of Assistance: Stand-by assistance  Interventions: Verbal cues  Supine to Sit: Stand-by assistance; Additional time; Adaptive equipment (HOB elevated)  Sit to Supine: Other (comment) (Left up in chair)  Scooting: Stand-by assistance  Balance  Sitting: Intact  Standing: Impaired  Standing - Static: Constant support;Poor  Standing - Dynamic: Constant support;Poor  Transfer Training  Transfer Training: Yes  Sit to Stand: Maximum assistance; Additional time;Assist X2 (To RW)  Stand to Sit: Maximum assistance;Assist X2;Additional time  Bed to Chair: Moderate assistance;Assist X2;Additional time (Assist for RW managment)          ADL  LE Dressing: Dependent/Total  LE Dressing Skilled Clinical Factors: sock management     Activity Tolerance  Activity Tolerance: Patient limited by pain        Vision  Vision: Impaired  Vision Exceptions: Wears glasses at all times  Hearing  Hearing: Exceptions to [facility]  Hearing Exceptions: Hard of hearing/hearing concerns (Has hearing aids but they don't work well)  Cognition  Overall Cognitive Status: WFL  Orientation  Overall Orientation Status: Within Normal Limits  Orientation Level: Oriented X4                  Education Given To: Patient  Education Provided: Role of Therapy;Plan of Care;Transfer Training;IADL Safety;Equipment  Education Method: Verbal;Demonstration  Barriers to Learning: Other (Comment) (anxiety)  Education Outcome: Verbalized understanding;Continued education needed         AM-PAC Score  AM-PAC Inpatient Daily Activity Raw Score: 15 (10/03/22 1727)  AM-PAC Inpatient ADL T-Scale Score : 34.69 (10/03/22 1727)  ADL Inpatient CMS 0-100% Score: 56.46 (10/03/22 1727)  ADL Inpatient CMS G-Code Modifier : CK (10/03/22 1727)      Goals  Short Term Goals  Time Frame for Short Term Goals: 1 week (10/10) unless noted  Short Term Goal 1: Pt will complete toilet/BSC transfer with min A  Short Term Goal 2: Pt will tolerate >3 min stand with min A and use of LRAD in preparation for standing level ADLs  Short Term Goal 3: Pt will complete LB dressing with min A and use of AE PRN  Short Term Goal 4: Pt will complete x15 reps of BUE exercises for strength and endurance  Patient Goals   Patient goals : \"I need to get back home to my wife\"       Therapy Time   Individual Concurrent Group Co-treatment   Time In 1600         Time Out 1643         Minutes 43         Timed Code Treatment Minutes: 33 Minutes (10 min eval)     If pt is unable to be seen after this session, please let this note serve as discharge summary. Please see case management note for discharge disposition. Thank you.       Laney Elliott OT General

## 2023-07-13 NOTE — ED ADULT NURSE NOTE - MODE OF DISCHARGE
4 Eyes Skin Assessment Completed by KATHY Campos and KATHY Romero.    Head WDL  Ears WDL  Nose WDL  Mouth WDL  Neck WDL  Breast/Chest WDL  Shoulder Blades WDL  Spine WDL  (R) Arm/Elbow/Hand WDL  (L) Arm/Elbow/Hand WDL  Abdomen Bruising and Incision, 6 Lap sites  Groin WDL  Scrotum/Coccyx/Buttocks WDL  (R) Leg WDL  (L) Leg WDL  (R) Heel/Foot/Toe WDL  (L) Heel/Foot/Toe WDL          Devices In Places Brandt      Interventions In Place N/A    Possible Skin Injury No    Pictures Uploaded Into Epic N/A  Wound Consult Placed N/A  RN Wound Prevention Protocol Ordered No      Ambulatory

## 2025-05-30 NOTE — H&P ADULT - NS MD HP PULSE POSTERIOR
Called and left patient and her daughter voicemail in regards to her appt at 1:30 PM with me today. Left the answering service number on voicemail. Have not heard back from patient or her daughter in regards to no show for the appt. Rescheduled patient to 6/19/25 at 2:30PM.    Non palpable b/l in ED